# Patient Record
Sex: FEMALE | Race: WHITE | NOT HISPANIC OR LATINO | URBAN - METROPOLITAN AREA
[De-identification: names, ages, dates, MRNs, and addresses within clinical notes are randomized per-mention and may not be internally consistent; named-entity substitution may affect disease eponyms.]

---

## 2017-04-21 ENCOUNTER — OUTPATIENT (OUTPATIENT)
Dept: OUTPATIENT SERVICES | Facility: HOSPITAL | Age: 42
LOS: 1 days | Discharge: HOME | End: 2017-04-21

## 2017-04-21 DIAGNOSIS — D27.1 BENIGN NEOPLASM OF LEFT OVARY: Chronic | ICD-10-CM

## 2017-04-24 DIAGNOSIS — F43.20 ADJUSTMENT DISORDER, UNSPECIFIED: ICD-10-CM

## 2017-11-13 ENCOUNTER — OUTPATIENT (OUTPATIENT)
Dept: OUTPATIENT SERVICES | Facility: HOSPITAL | Age: 42
LOS: 1 days | End: 2017-11-13
Payer: COMMERCIAL

## 2017-11-13 ENCOUNTER — APPOINTMENT (OUTPATIENT)
Dept: MRI IMAGING | Facility: CLINIC | Age: 42
End: 2017-11-13
Payer: SELF-PAY

## 2017-11-13 DIAGNOSIS — D27.1 BENIGN NEOPLASM OF LEFT OVARY: Chronic | ICD-10-CM

## 2017-11-13 DIAGNOSIS — Z00.8 ENCOUNTER FOR OTHER GENERAL EXAMINATION: ICD-10-CM

## 2017-11-13 PROCEDURE — 73721 MRI JNT OF LWR EXTRE W/O DYE: CPT | Mod: 26,RT

## 2017-11-13 PROCEDURE — 73721 MRI JNT OF LWR EXTRE W/O DYE: CPT

## 2020-12-03 ENCOUNTER — INPATIENT (INPATIENT)
Facility: HOSPITAL | Age: 45
LOS: 2 days | Discharge: HOME | End: 2020-12-06
Attending: INTERNAL MEDICINE | Admitting: INTERNAL MEDICINE
Payer: COMMERCIAL

## 2020-12-03 VITALS — HEIGHT: 67 IN

## 2020-12-03 DIAGNOSIS — D27.1 BENIGN NEOPLASM OF LEFT OVARY: Chronic | ICD-10-CM

## 2020-12-03 DIAGNOSIS — R07.9 CHEST PAIN, UNSPECIFIED: ICD-10-CM

## 2020-12-03 LAB
ALBUMIN SERPL ELPH-MCNC: 4 G/DL — SIGNIFICANT CHANGE UP (ref 3.3–5)
ALBUMIN SERPL ELPH-MCNC: 4.1 G/DL — SIGNIFICANT CHANGE UP (ref 3.3–5)
ALBUMIN SERPL ELPH-MCNC: 4.7 G/DL — SIGNIFICANT CHANGE UP (ref 3.3–5)
ALP SERPL-CCNC: 59 U/L — SIGNIFICANT CHANGE UP (ref 40–120)
ALP SERPL-CCNC: 61 U/L — SIGNIFICANT CHANGE UP (ref 40–120)
ALP SERPL-CCNC: 74 U/L — SIGNIFICANT CHANGE UP (ref 40–120)
ALT FLD-CCNC: 20 U/L — SIGNIFICANT CHANGE UP (ref 4–33)
ALT FLD-CCNC: 25 U/L — SIGNIFICANT CHANGE UP (ref 4–33)
ALT FLD-CCNC: 29 U/L — SIGNIFICANT CHANGE UP (ref 4–33)
ANION GAP SERPL CALC-SCNC: 14 MMO/L — SIGNIFICANT CHANGE UP (ref 7–14)
ANION GAP SERPL CALC-SCNC: 18 MMO/L — HIGH (ref 7–14)
APTT BLD: 29.8 SEC — SIGNIFICANT CHANGE UP (ref 27–36.3)
APTT BLD: 32.4 SEC — SIGNIFICANT CHANGE UP (ref 27–36.3)
AST SERPL-CCNC: 104 U/L — HIGH (ref 4–32)
AST SERPL-CCNC: 16 U/L — SIGNIFICANT CHANGE UP (ref 4–32)
AST SERPL-CCNC: 92 U/L — HIGH (ref 4–32)
BASOPHILS # BLD AUTO: 0.08 K/UL — SIGNIFICANT CHANGE UP (ref 0–0.2)
BASOPHILS # BLD AUTO: 0.12 K/UL — SIGNIFICANT CHANGE UP (ref 0–0.2)
BASOPHILS NFR BLD AUTO: 0.5 % — SIGNIFICANT CHANGE UP (ref 0–2)
BASOPHILS NFR BLD AUTO: 0.8 % — SIGNIFICANT CHANGE UP (ref 0–2)
BILIRUB DIRECT SERPL-MCNC: < 0.2 MG/DL — SIGNIFICANT CHANGE UP (ref 0.1–0.2)
BILIRUB SERPL-MCNC: 0.4 MG/DL — SIGNIFICANT CHANGE UP (ref 0.2–1.2)
BILIRUB SERPL-MCNC: 0.6 MG/DL — SIGNIFICANT CHANGE UP (ref 0.2–1.2)
BILIRUB SERPL-MCNC: 0.8 MG/DL — SIGNIFICANT CHANGE UP (ref 0.2–1.2)
BUN SERPL-MCNC: 14 MG/DL — SIGNIFICANT CHANGE UP (ref 7–23)
BUN SERPL-MCNC: 9 MG/DL — SIGNIFICANT CHANGE UP (ref 7–23)
CALCIUM SERPL-MCNC: 8.9 MG/DL — SIGNIFICANT CHANGE UP (ref 8.4–10.5)
CALCIUM SERPL-MCNC: 9.8 MG/DL — SIGNIFICANT CHANGE UP (ref 8.4–10.5)
CHLORIDE SERPL-SCNC: 100 MMOL/L — SIGNIFICANT CHANGE UP (ref 98–107)
CHLORIDE SERPL-SCNC: 101 MMOL/L — SIGNIFICANT CHANGE UP (ref 98–107)
CHOLEST SERPL-MCNC: 180 MG/DL — SIGNIFICANT CHANGE UP (ref 120–199)
CK MB BLD-MCNC: 1.72 NG/ML — SIGNIFICANT CHANGE UP (ref 1–4.7)
CK MB BLD-MCNC: 10.2 — HIGH (ref 0–2.5)
CK MB BLD-MCNC: 9.9 — HIGH (ref 0–2.5)
CK MB BLD-MCNC: 91.3 NG/ML — HIGH (ref 1–4.7)
CK MB BLD-MCNC: 97.94 NG/ML — HIGH (ref 1–4.7)
CK MB BLD-MCNC: SIGNIFICANT CHANGE UP (ref 0–2.5)
CK SERPL-CCNC: 59 U/L — SIGNIFICANT CHANGE UP (ref 25–170)
CK SERPL-CCNC: 895 U/L — HIGH (ref 25–170)
CK SERPL-CCNC: 986 U/L — HIGH (ref 25–170)
CO2 SERPL-SCNC: 21 MMOL/L — LOW (ref 22–31)
CO2 SERPL-SCNC: 22 MMOL/L — SIGNIFICANT CHANGE UP (ref 22–31)
CREAT SERPL-MCNC: 0.48 MG/DL — LOW (ref 0.5–1.3)
CREAT SERPL-MCNC: 0.62 MG/DL — SIGNIFICANT CHANGE UP (ref 0.5–1.3)
D DIMER BLD IA.RAPID-MCNC: 235 NG/ML — SIGNIFICANT CHANGE UP
DIRECT LDL: 113 MG/DL — SIGNIFICANT CHANGE UP
EOSINOPHIL # BLD AUTO: 0.04 K/UL — SIGNIFICANT CHANGE UP (ref 0–0.5)
EOSINOPHIL # BLD AUTO: 0.23 K/UL — SIGNIFICANT CHANGE UP (ref 0–0.5)
EOSINOPHIL NFR BLD AUTO: 0.3 % — SIGNIFICANT CHANGE UP (ref 0–6)
EOSINOPHIL NFR BLD AUTO: 1.6 % — SIGNIFICANT CHANGE UP (ref 0–6)
GLUCOSE SERPL-MCNC: 124 MG/DL — HIGH (ref 70–99)
GLUCOSE SERPL-MCNC: 126 MG/DL — HIGH (ref 70–99)
HBA1C BLD-MCNC: 5.4 % — SIGNIFICANT CHANGE UP (ref 4–5.6)
HCT VFR BLD CALC: 41.4 % — SIGNIFICANT CHANGE UP (ref 34.5–45)
HCT VFR BLD CALC: 44.6 % — SIGNIFICANT CHANGE UP (ref 34.5–45)
HCT VFR BLD CALC: 45.6 % — HIGH (ref 34.5–45)
HDLC SERPL-MCNC: 56 MG/DL — SIGNIFICANT CHANGE UP (ref 45–65)
HGB BLD-MCNC: 13.6 G/DL — SIGNIFICANT CHANGE UP (ref 11.5–15.5)
HGB BLD-MCNC: 14.6 G/DL — SIGNIFICANT CHANGE UP (ref 11.5–15.5)
HGB BLD-MCNC: 14.8 G/DL — SIGNIFICANT CHANGE UP (ref 11.5–15.5)
IMM GRANULOCYTES NFR BLD AUTO: 0.3 % — SIGNIFICANT CHANGE UP (ref 0–1.5)
IMM GRANULOCYTES NFR BLD AUTO: 0.5 % — SIGNIFICANT CHANGE UP (ref 0–1.5)
INR BLD: 1.04 — SIGNIFICANT CHANGE UP (ref 0.88–1.16)
INR BLD: 1.13 — SIGNIFICANT CHANGE UP (ref 0.88–1.16)
LYMPHOCYTES # BLD AUTO: 13.1 % — SIGNIFICANT CHANGE UP (ref 13–44)
LYMPHOCYTES # BLD AUTO: 2.03 K/UL — SIGNIFICANT CHANGE UP (ref 1–3.3)
LYMPHOCYTES # BLD AUTO: 34.3 % — SIGNIFICANT CHANGE UP (ref 13–44)
LYMPHOCYTES # BLD AUTO: 4.97 K/UL — HIGH (ref 1–3.3)
MAGNESIUM SERPL-MCNC: 2 MG/DL — SIGNIFICANT CHANGE UP (ref 1.6–2.6)
MCHC RBC-ENTMCNC: 28.5 PG — SIGNIFICANT CHANGE UP (ref 27–34)
MCHC RBC-ENTMCNC: 28.5 PG — SIGNIFICANT CHANGE UP (ref 27–34)
MCHC RBC-ENTMCNC: 28.9 PG — SIGNIFICANT CHANGE UP (ref 27–34)
MCHC RBC-ENTMCNC: 32.5 % — SIGNIFICANT CHANGE UP (ref 32–36)
MCHC RBC-ENTMCNC: 32.7 % — SIGNIFICANT CHANGE UP (ref 32–36)
MCHC RBC-ENTMCNC: 32.9 % — SIGNIFICANT CHANGE UP (ref 32–36)
MCV RBC AUTO: 86.6 FL — SIGNIFICANT CHANGE UP (ref 80–100)
MCV RBC AUTO: 87.9 FL — SIGNIFICANT CHANGE UP (ref 80–100)
MCV RBC AUTO: 88.3 FL — SIGNIFICANT CHANGE UP (ref 80–100)
MONOCYTES # BLD AUTO: 0.92 K/UL — HIGH (ref 0–0.9)
MONOCYTES # BLD AUTO: 1 K/UL — HIGH (ref 0–0.9)
MONOCYTES NFR BLD AUTO: 5.9 % — SIGNIFICANT CHANGE UP (ref 2–14)
MONOCYTES NFR BLD AUTO: 6.9 % — SIGNIFICANT CHANGE UP (ref 2–14)
NEUTROPHILS # BLD AUTO: 12.33 K/UL — HIGH (ref 1.8–7.4)
NEUTROPHILS # BLD AUTO: 8.1 K/UL — HIGH (ref 1.8–7.4)
NEUTROPHILS NFR BLD AUTO: 56.1 % — SIGNIFICANT CHANGE UP (ref 43–77)
NEUTROPHILS NFR BLD AUTO: 79.7 % — HIGH (ref 43–77)
NRBC # FLD: 0 K/UL — SIGNIFICANT CHANGE UP (ref 0–0)
NT-PROBNP SERPL-SCNC: 631.5 PG/ML — SIGNIFICANT CHANGE UP
PHOSPHATE SERPL-MCNC: 2.8 MG/DL — SIGNIFICANT CHANGE UP (ref 2.5–4.5)
PLATELET # BLD AUTO: 213 K/UL — SIGNIFICANT CHANGE UP (ref 150–400)
PLATELET # BLD AUTO: 252 K/UL — SIGNIFICANT CHANGE UP (ref 150–400)
PLATELET # BLD AUTO: 287 K/UL — SIGNIFICANT CHANGE UP (ref 150–400)
PMV BLD: 10.7 FL — SIGNIFICANT CHANGE UP (ref 7–13)
PMV BLD: 11 FL — SIGNIFICANT CHANGE UP (ref 7–13)
PMV BLD: 11.1 FL — SIGNIFICANT CHANGE UP (ref 7–13)
POTASSIUM SERPL-MCNC: 3.5 MMOL/L — SIGNIFICANT CHANGE UP (ref 3.5–5.3)
POTASSIUM SERPL-MCNC: 3.6 MMOL/L — SIGNIFICANT CHANGE UP (ref 3.5–5.3)
POTASSIUM SERPL-SCNC: 3.5 MMOL/L — SIGNIFICANT CHANGE UP (ref 3.5–5.3)
POTASSIUM SERPL-SCNC: 3.6 MMOL/L — SIGNIFICANT CHANGE UP (ref 3.5–5.3)
PROT SERPL-MCNC: 6.6 G/DL — SIGNIFICANT CHANGE UP (ref 6–8.3)
PROT SERPL-MCNC: 6.9 G/DL — SIGNIFICANT CHANGE UP (ref 6–8.3)
PROT SERPL-MCNC: 7.2 G/DL — SIGNIFICANT CHANGE UP (ref 6–8.3)
PROTHROM AB SERPL-ACNC: 11.8 SEC — SIGNIFICANT CHANGE UP (ref 10.6–13.6)
PROTHROM AB SERPL-ACNC: 12.9 SEC — SIGNIFICANT CHANGE UP (ref 10.6–13.6)
RBC # BLD: 4.78 M/UL — SIGNIFICANT CHANGE UP (ref 3.8–5.2)
RBC # BLD: 5.05 M/UL — SIGNIFICANT CHANGE UP (ref 3.8–5.2)
RBC # BLD: 5.19 M/UL — SIGNIFICANT CHANGE UP (ref 3.8–5.2)
RBC # FLD: 14 % — SIGNIFICANT CHANGE UP (ref 10.3–14.5)
RBC # FLD: 14.1 % — SIGNIFICANT CHANGE UP (ref 10.3–14.5)
RBC # FLD: 14.2 % — SIGNIFICANT CHANGE UP (ref 10.3–14.5)
SARS-COV-2 RNA SPEC QL NAA+PROBE: SIGNIFICANT CHANGE UP
SODIUM SERPL-SCNC: 136 MMOL/L — SIGNIFICANT CHANGE UP (ref 135–145)
SODIUM SERPL-SCNC: 140 MMOL/L — SIGNIFICANT CHANGE UP (ref 135–145)
TRIGL SERPL-MCNC: 146 MG/DL — SIGNIFICANT CHANGE UP (ref 10–149)
TROPONIN T, HIGH SENSITIVITY: 1564 NG/L — CRITICAL HIGH (ref ?–14)
TROPONIN T, HIGH SENSITIVITY: 1568 NG/L — CRITICAL HIGH (ref ?–14)
TROPONIN T, HIGH SENSITIVITY: 39 NG/L — SIGNIFICANT CHANGE UP (ref ?–14)
TSH SERPL-MCNC: 1.57 UIU/ML — SIGNIFICANT CHANGE UP (ref 0.27–4.2)
TSH SERPL-MCNC: 2.36 UIU/ML — SIGNIFICANT CHANGE UP (ref 0.27–4.2)
WBC # BLD: 14.47 K/UL — HIGH (ref 3.8–10.5)
WBC # BLD: 14.56 K/UL — HIGH (ref 3.8–10.5)
WBC # BLD: 15.48 K/UL — HIGH (ref 3.8–10.5)
WBC # FLD AUTO: 14.47 K/UL — HIGH (ref 3.8–10.5)
WBC # FLD AUTO: 14.56 K/UL — HIGH (ref 3.8–10.5)
WBC # FLD AUTO: 15.48 K/UL — HIGH (ref 3.8–10.5)

## 2020-12-03 PROCEDURE — 92941 PRQ TRLML REVSC TOT OCCL AMI: CPT | Mod: LD

## 2020-12-03 PROCEDURE — 93458 L HRT ARTERY/VENTRICLE ANGIO: CPT | Mod: 26,59

## 2020-12-03 PROCEDURE — 99291 CRITICAL CARE FIRST HOUR: CPT

## 2020-12-03 PROCEDURE — 74174 CTA ABD&PLVS W/CONTRAST: CPT | Mod: 26

## 2020-12-03 PROCEDURE — 92978 ENDOLUMINL IVUS OCT C 1ST: CPT | Mod: 26,LD

## 2020-12-03 PROCEDURE — 71045 X-RAY EXAM CHEST 1 VIEW: CPT | Mod: 26

## 2020-12-03 PROCEDURE — 99223 1ST HOSP IP/OBS HIGH 75: CPT

## 2020-12-03 PROCEDURE — 71275 CT ANGIOGRAPHY CHEST: CPT | Mod: 26

## 2020-12-03 PROCEDURE — 93306 TTE W/DOPPLER COMPLETE: CPT | Mod: 26

## 2020-12-03 PROCEDURE — 99152 MOD SED SAME PHYS/QHP 5/>YRS: CPT

## 2020-12-03 RX ORDER — ACETAMINOPHEN 500 MG
650 TABLET ORAL ONCE
Refills: 0 | Status: COMPLETED | OUTPATIENT
Start: 2020-12-03 | End: 2020-12-03

## 2020-12-03 RX ORDER — LABETALOL HCL 100 MG
10 TABLET ORAL ONCE
Refills: 0 | Status: COMPLETED | OUTPATIENT
Start: 2020-12-03 | End: 2020-12-03

## 2020-12-03 RX ORDER — ATORVASTATIN CALCIUM 80 MG/1
80 TABLET, FILM COATED ORAL AT BEDTIME
Refills: 0 | Status: DISCONTINUED | OUTPATIENT
Start: 2020-12-03 | End: 2020-12-06

## 2020-12-03 RX ORDER — METOPROLOL TARTRATE 50 MG
25 TABLET ORAL
Refills: 0 | Status: DISCONTINUED | OUTPATIENT
Start: 2020-12-03 | End: 2020-12-03

## 2020-12-03 RX ORDER — HEPARIN SODIUM 5000 [USP'U]/ML
4000 INJECTION INTRAVENOUS; SUBCUTANEOUS EVERY 6 HOURS
Refills: 0 | Status: DISCONTINUED | OUTPATIENT
Start: 2020-12-03 | End: 2020-12-03

## 2020-12-03 RX ORDER — NITROGLYCERIN 6.5 MG
0.4 CAPSULE, EXTENDED RELEASE ORAL
Refills: 0 | Status: DISCONTINUED | OUTPATIENT
Start: 2020-12-03 | End: 2020-12-03

## 2020-12-03 RX ORDER — CARVEDILOL PHOSPHATE 80 MG/1
6.25 CAPSULE, EXTENDED RELEASE ORAL EVERY 12 HOURS
Refills: 0 | Status: DISCONTINUED | OUTPATIENT
Start: 2020-12-03 | End: 2020-12-03

## 2020-12-03 RX ORDER — LOSARTAN POTASSIUM 100 MG/1
25 TABLET, FILM COATED ORAL ONCE
Refills: 0 | Status: COMPLETED | OUTPATIENT
Start: 2020-12-03 | End: 2020-12-03

## 2020-12-03 RX ORDER — NICOTINE POLACRILEX 2 MG
1 GUM BUCCAL DAILY
Refills: 0 | Status: DISCONTINUED | OUTPATIENT
Start: 2020-12-03 | End: 2020-12-06

## 2020-12-03 RX ORDER — HEPARIN SODIUM 5000 [USP'U]/ML
INJECTION INTRAVENOUS; SUBCUTANEOUS
Qty: 25000 | Refills: 0 | Status: DISCONTINUED | OUTPATIENT
Start: 2020-12-03 | End: 2020-12-03

## 2020-12-03 RX ORDER — ONDANSETRON 8 MG/1
4 TABLET, FILM COATED ORAL ONCE
Refills: 0 | Status: COMPLETED | OUTPATIENT
Start: 2020-12-03 | End: 2020-12-03

## 2020-12-03 RX ORDER — LOSARTAN POTASSIUM 100 MG/1
50 TABLET, FILM COATED ORAL DAILY
Refills: 0 | Status: DISCONTINUED | OUTPATIENT
Start: 2020-12-04 | End: 2020-12-05

## 2020-12-03 RX ORDER — CHLORHEXIDINE GLUCONATE 213 G/1000ML
1 SOLUTION TOPICAL
Refills: 0 | Status: DISCONTINUED | OUTPATIENT
Start: 2020-12-03 | End: 2020-12-05

## 2020-12-03 RX ORDER — CARVEDILOL PHOSPHATE 80 MG/1
12.5 CAPSULE, EXTENDED RELEASE ORAL EVERY 12 HOURS
Refills: 0 | Status: DISCONTINUED | OUTPATIENT
Start: 2020-12-03 | End: 2020-12-06

## 2020-12-03 RX ORDER — LOSARTAN POTASSIUM 100 MG/1
25 TABLET, FILM COATED ORAL DAILY
Refills: 0 | Status: DISCONTINUED | OUTPATIENT
Start: 2020-12-03 | End: 2020-12-03

## 2020-12-03 RX ORDER — PANTOPRAZOLE SODIUM 20 MG/1
40 TABLET, DELAYED RELEASE ORAL
Refills: 0 | Status: DISCONTINUED | OUTPATIENT
Start: 2020-12-03 | End: 2020-12-06

## 2020-12-03 RX ORDER — LOSARTAN POTASSIUM 100 MG/1
50 TABLET, FILM COATED ORAL DAILY
Refills: 0 | Status: DISCONTINUED | OUTPATIENT
Start: 2020-12-03 | End: 2020-12-03

## 2020-12-03 RX ORDER — ASPIRIN/CALCIUM CARB/MAGNESIUM 324 MG
324 TABLET ORAL ONCE
Refills: 0 | Status: COMPLETED | OUTPATIENT
Start: 2020-12-03 | End: 2020-12-03

## 2020-12-03 RX ORDER — ASPIRIN/CALCIUM CARB/MAGNESIUM 324 MG
81 TABLET ORAL DAILY
Refills: 0 | Status: DISCONTINUED | OUTPATIENT
Start: 2020-12-03 | End: 2020-12-06

## 2020-12-03 RX ORDER — TICAGRELOR 90 MG/1
180 TABLET ORAL ONCE
Refills: 0 | Status: COMPLETED | OUTPATIENT
Start: 2020-12-03 | End: 2020-12-03

## 2020-12-03 RX ORDER — HEPARIN SODIUM 5000 [USP'U]/ML
4000 INJECTION INTRAVENOUS; SUBCUTANEOUS ONCE
Refills: 0 | Status: COMPLETED | OUTPATIENT
Start: 2020-12-03 | End: 2020-12-03

## 2020-12-03 RX ORDER — NITROGLYCERIN 6.5 MG
10 CAPSULE, EXTENDED RELEASE ORAL
Qty: 50 | Refills: 0 | Status: DISCONTINUED | OUTPATIENT
Start: 2020-12-03 | End: 2020-12-04

## 2020-12-03 RX ORDER — LANOLIN ALCOHOL/MO/W.PET/CERES
9 CREAM (GRAM) TOPICAL AT BEDTIME
Refills: 0 | Status: DISCONTINUED | OUTPATIENT
Start: 2020-12-03 | End: 2020-12-06

## 2020-12-03 RX ORDER — FENTANYL CITRATE 50 UG/ML
50 INJECTION INTRAVENOUS ONCE
Refills: 0 | Status: DISCONTINUED | OUTPATIENT
Start: 2020-12-03 | End: 2020-12-03

## 2020-12-03 RX ORDER — HEPARIN SODIUM 5000 [USP'U]/ML
5000 INJECTION INTRAVENOUS; SUBCUTANEOUS EVERY 12 HOURS
Refills: 0 | Status: DISCONTINUED | OUTPATIENT
Start: 2020-12-03 | End: 2020-12-06

## 2020-12-03 RX ORDER — TICAGRELOR 90 MG/1
90 TABLET ORAL EVERY 12 HOURS
Refills: 0 | Status: DISCONTINUED | OUTPATIENT
Start: 2020-12-03 | End: 2020-12-06

## 2020-12-03 RX ADMIN — Medication 81 MILLIGRAM(S): at 10:52

## 2020-12-03 RX ADMIN — HEPARIN SODIUM 1000 UNIT(S)/HR: 5000 INJECTION INTRAVENOUS; SUBCUTANEOUS at 03:19

## 2020-12-03 RX ADMIN — ONDANSETRON 4 MILLIGRAM(S): 8 TABLET, FILM COATED ORAL at 15:50

## 2020-12-03 RX ADMIN — LOSARTAN POTASSIUM 25 MILLIGRAM(S): 100 TABLET, FILM COATED ORAL at 12:42

## 2020-12-03 RX ADMIN — Medication 0.5 MILLIGRAM(S): at 16:21

## 2020-12-03 RX ADMIN — Medication 650 MILLIGRAM(S): at 08:06

## 2020-12-03 RX ADMIN — TICAGRELOR 180 MILLIGRAM(S): 90 TABLET ORAL at 03:18

## 2020-12-03 RX ADMIN — Medication 3 MICROGRAM(S)/MIN: at 14:58

## 2020-12-03 RX ADMIN — Medication 10 MILLIGRAM(S): at 12:42

## 2020-12-03 RX ADMIN — CARVEDILOL PHOSPHATE 12.5 MILLIGRAM(S): 80 CAPSULE, EXTENDED RELEASE ORAL at 16:26

## 2020-12-03 RX ADMIN — Medication 650 MILLIGRAM(S): at 20:30

## 2020-12-03 RX ADMIN — Medication 650 MILLIGRAM(S): at 09:00

## 2020-12-03 RX ADMIN — FENTANYL CITRATE 50 MICROGRAM(S): 50 INJECTION INTRAVENOUS at 03:18

## 2020-12-03 RX ADMIN — PANTOPRAZOLE SODIUM 40 MILLIGRAM(S): 20 TABLET, DELAYED RELEASE ORAL at 08:06

## 2020-12-03 RX ADMIN — Medication 324 MILLIGRAM(S): at 03:17

## 2020-12-03 RX ADMIN — ATORVASTATIN CALCIUM 80 MILLIGRAM(S): 80 TABLET, FILM COATED ORAL at 21:38

## 2020-12-03 RX ADMIN — Medication 9 MILLIGRAM(S): at 21:38

## 2020-12-03 RX ADMIN — Medication 0.4 MILLIGRAM(S): at 03:19

## 2020-12-03 RX ADMIN — LOSARTAN POTASSIUM 25 MILLIGRAM(S): 100 TABLET, FILM COATED ORAL at 10:52

## 2020-12-03 RX ADMIN — Medication 25 MILLIGRAM(S): at 06:35

## 2020-12-03 RX ADMIN — TICAGRELOR 90 MILLIGRAM(S): 90 TABLET ORAL at 16:26

## 2020-12-03 RX ADMIN — Medication 10 MILLIGRAM(S): at 03:18

## 2020-12-03 RX ADMIN — HEPARIN SODIUM 4000 UNIT(S): 5000 INJECTION INTRAVENOUS; SUBCUTANEOUS at 03:19

## 2020-12-03 RX ADMIN — Medication 3 MICROGRAM(S)/MIN: at 19:00

## 2020-12-03 RX ADMIN — Medication 1 PATCH: at 19:17

## 2020-12-03 RX ADMIN — HEPARIN SODIUM 5000 UNIT(S): 5000 INJECTION INTRAVENOUS; SUBCUTANEOUS at 17:15

## 2020-12-03 RX ADMIN — CHLORHEXIDINE GLUCONATE 1 APPLICATION(S): 213 SOLUTION TOPICAL at 12:29

## 2020-12-03 RX ADMIN — Medication 0.5 MILLIGRAM(S): at 23:22

## 2020-12-03 RX ADMIN — Medication 1 PATCH: at 10:52

## 2020-12-03 RX ADMIN — HEPARIN SODIUM 4000 UNIT(S): 5000 INJECTION INTRAVENOUS; SUBCUTANEOUS at 03:18

## 2020-12-03 RX ADMIN — Medication 650 MILLIGRAM(S): at 20:00

## 2020-12-03 RX ADMIN — HEPARIN SODIUM 5000 UNIT(S): 5000 INJECTION INTRAVENOUS; SUBCUTANEOUS at 06:35

## 2020-12-03 NOTE — H&P ADULT - NSHPSOCIALHISTORY_GEN_ALL_CORE
Lives with   Works as   Daily smoker x 25 years  Daily drinker: vodka Lives with   Works as   Daily smoker x 25 years  Drinks 3-5 x week: vodka

## 2020-12-03 NOTE — H&P ADULT - HISTORY OF PRESENT ILLNESS
46 yo female PMH HTN, active smoker presents with chest pain. Reports pain started 4 days ago. Describes pain as substernal radiating bilaterally to arms and back. The pain returned 2 days later and again last night. In ED ekg shows KATHRYN in anterior leads. Cath lab activated. Pt now s/p LHC with THIAGO to pLAD and mLAD. EDP33, EF 40%. Transfer to CCU             46 yo female PMH HTN, active smoker presents with chest pain. Reports pain started 4 days ago. Describes pain as substernal radiating bilaterally to arms and back. The pain returned 2 days later and again last night. In ED CXR with widened mediastinum, CTA done and negative for dissection. ekg shows KATHRYN in anterior leads. Cath lab activated. Pt loaded with aspirin, Brilinta and heparin per [facility] protocol. Pt now s/p LHC with THIAGO to pLAD and mLAD. EDP33, EF 40%. Transfer to CCU

## 2020-12-03 NOTE — ED PROVIDER NOTE - CLINICAL SUMMARY MEDICAL DECISION MAKING FREE TEXT BOX
46 y/o F with h/o HTN, active smoker with chest pain.  EKG in triage with STEMI.  d/w interventional cards, cath lab activated.  Given hypertension, back pain, and wide appearing mediastinum on cxr, will obtain CTA to r/o dissection, ccu at bedside.

## 2020-12-03 NOTE — PROGRESS NOTE ADULT - SUBJECTIVE AND OBJECTIVE BOX
Chief compliant: intermittent chest pain x 3 days    Interval history/Overnight events:    Tele events:    Vital Signs Last 24 Hrs  T(C): 36.5 (03 Dec 2020 03:15), Max: 36.5 (03 Dec 2020 03:15)  T(F): 97.7 (03 Dec 2020 03:15), Max: 97.7 (03 Dec 2020 03:15)  HR: 89 (03 Dec 2020 06:00) (84 - 89)  BP: 176/117 (03 Dec 2020 06:00) (167/112 - 183/110)  BP(mean): 132 (03 Dec 2020 06:00) (121 - 133)  RR: 21 (03 Dec 2020 06:00) (17 - 21)  SpO2: 99% (03 Dec 2020 06:00) (96% - 99%)    Allergies: No Known Allergies          MEDICATIONS  (STANDING):  aspirin enteric coated 81 milliGRAM(s) Oral daily  atorvastatin 80 milliGRAM(s) Oral at bedtime  chlorhexidine 4% Liquid 1 Application(s) Topical <User Schedule>  heparin   Injectable 5000 Unit(s) SubCutaneous every 12 hours  metoprolol tartrate 25 milliGRAM(s) Oral two times a day  nicotine -  14 mG/24Hr(s) Patch 1 patch Transdermal daily  ticagrelor 90 milliGRAM(s) Oral every 12 hours    MEDICATIONS  (PRN):      CONSTITUTIONAL: No fevers, No chills, No fatigue, No weight gain  EYES: No vision changes   ENT: No congestion, No ear pain, No sore throat.  NECK: No pain, No stiffness  RESPIRATORY: No shortness of breath, No cough, No wheezing, No hemoptysis  CARDIOVASCULAR: No chest pain. No palpitations, No BARRIGA, No orthopnea, No paroxysmal nocturnal dyspnea, No pleuritic pain  GASTROINTESTINAL: No abdominal pain, No nausea, No vomiting, No hematemesis, No diarrhea No constipation. No melena  GENITOURINARY: No dysuria, No frequency, No incontinence, No hematuria  NEUROLOGICAL: No dizziness, No lightheadedness, No syncope, No LOC, No headache, No numbness or weakness  MUSCULOSKELETAL: No Edema, No joint pain, No joint swelling.  PSYCHIATRIC: No anxiety, No depression  DERMATOLOGY: No diaphoresis. No itching, No rashes, No pressure ulcers  HEME/LYMPH: No easy bruising, or bleeding gums        PHYSICAL EXAMINATION  Appearance: NAD, no distress  HEENT: Moist Mucous Membranes, Anicteric, PERRL, EOMI  Cardiovascular: Regular rate and rhythm, Normal S1 S2, No JVD, No murmurs  Respiratory: Lungs clear to auscultation. No rales, No rhonchi, No wheezing. No tenderness to palpation  Gastrointestinal:  Soft, Non-tender, + BS  Neurologic: Non-focal, A&Ox3  Skin: Warm and dry, No rashes, No ecchymosis, No cyanosis  Musculoskeletal: No clubbing, No cyanosis, No edema  Vascular: Peripheral pulses palpable 2+ bilaterally  Psychiatry: Mood & affect appropriate      	    		      I&O's Summary    02 Dec 2020 07:01  -  03 Dec 2020 07:00  --------------------------------------------------------  IN: 0 mL / OUT: 100 mL / NET: -100 mL    	        Imaging:  echo:  Stress test:  Cath:  CT scan:    Labs:                          13.6   15.48 )-----------( 213      ( 03 Dec 2020 06:20 )             41.4       12-03    136  |  101  |  9   ----------------------------<  124<H>  3.6   |  21<L>  |  0.48<L>    Ca    8.9      03 Dec 2020 06:20  Phos  2.8     12-03  Mg     2.0     12-03    TPro  6.6  /  Alb  4.0  /  TBili  0.6  /  DBili  x   /  AST  92<H>  /  ALT  25  /  AlkPhos  61  12-03      CARDIAC MARKERS ( 03 Dec 2020 06:20 )  x     / x     / 895 u/L / 91.30 ng/mL / x      CARDIAC MARKERS ( 03 Dec 2020 00:45 )  x     / x     / 59 u/L / 1.72 ng/mL / x          proBNP  Serum Pro-Brain Natriuretic Peptide: 631.5 pg/mL (12-03 @ 06:20)      Chol,Trig,LDL,HDL,A1C  Cholesterol, Serum: 180 mg/dL (12-03 @ 06:20)  Triglycerides, Serum: 146 mg/dL (12-03 @ 06:20)  HDL Cholesterol, Serum: 56 mg/dL (12-03 @ 06:20)      LIVER FUNCTIONS - ( 03 Dec 2020 06:20 )  Alb: 4.0 g/dL / Pro: 6.6 g/dL / ALK PHOS: 61 u/L / ALT: 25 u/L / AST: 92 u/L / GGT: x             PT/INR - ( 03 Dec 2020 06:20 )   PT: 12.9 SEC;   INR: 1.13          PTT - ( 03 Dec 2020 06:20 )  PTT:32.4 SEC  LABORATORY VALUES	 	                          13.6   15.48 )-----------( 213      ( 03 Dec 2020 06:20 )             41.4       12-03    136  |  101  |  9   ----------------------------<  124<H>  3.6   |  21<L>  |  0.48<L>  12-03    140  |  100  |  14  ----------------------------<  126<H>  3.5   |  22  |  0.62    Ca    8.9      03 Dec 2020 06:20  Ca    9.8      03 Dec 2020 00:45  Phos  2.8     12-03  Mg     2.0     12-03    TPro  6.6  /  Alb  4.0  /  TBili  0.6  /  DBili  x   /  AST  92<H>  /  ALT  25  /  AlkPhos  61  12-03  TPro  7.2  /  Alb  4.7  /  TBili  0.4  /  DBili  x   /  AST  16  /  ALT  20  /  AlkPhos  74  12-03    LIVER FUNCTIONS - ( 03 Dec 2020 06:20 )  Alb: 4.0 g/dL / Pro: 6.6 g/dL / ALK PHOS: 61 u/L / ALT: 25 u/L / AST: 92 u/L / GGT: x           Prothrombin Time, Plasma: 12.9 SEC (12-03 @ 06:20)      CARDIAC MARKERS:  Creatine Kinase, Serum: 895 u/L (12-03 @ 06:20)  Creatine Kinase, Serum: 59 u/L (12-03 @ 00:45)    CKMB: 91.30 ng/mL (12-03 @ 06:20)  CKMB: 1.72 ng/mL (12-03 @ 00:45)    CKMB Relative Index: 10.2 (12-03 @ 06:20)  CKMB Relative Index: Test not performed (12-03 @ 00:45)    Troponin T, High Sensitivity: 1564 ng/L (12-03 @ 06:20)  Troponin T, High Sensitivity: 39 ng/L (12-03 @ 00:45)    Serum Pro-Brain Natriuretic Peptide: 631.5 pg/mL (12-03 @ 06:20)      12-03 @ 06:20  Cholesterol, Serum - 180  Direct LDL- 113  HDL Cholesterol, Serum- 56  Triglycerides, Serum- 146      Thyroid Stimulating Hormone, Serum: 1.57 uIU/mL (12-03 @ 06:20)        CAPILLARY BLOOD GLUCOSE                CBC Full  -  ( 03 Dec 2020 06:20 )  WBC Count : 15.48 K/uL  Hemoglobin : 13.6 g/dL  Hematocrit : 41.4 %  Platelet Count - Automated : 213 K/uL  Mean Cell Volume : 86.6 fL  Mean Cell Hemoglobin : 28.5 pg  Mean Cell Hemoglobin Concentration : 32.9 %  Auto Neutrophil # : 12.33 K/uL  Auto Lymphocyte # : 2.03 K/uL  Auto Monocyte # : 0.92 K/uL  Auto Eosinophil # : 0.04 K/uL  Auto Basophil # : 0.08 K/uL  Auto Neutrophil % : 79.7 %  Auto Lymphocyte % : 13.1 %  Auto Monocyte % : 5.9 %  Auto Eosinophil % : 0.3 %  Auto Basophil % : 0.5 %      12-03    136  |  101  |  9   ----------------------------<  124<H>  3.6   |  21<L>  |  0.48<L>  12-03    140  |  100  |  14  ----------------------------<  126<H>  3.5   |  22  |  0.62    Ca    8.9      03 Dec 2020 06:20  Ca    9.8      03 Dec 2020 00:45  Phos  2.8     12-03  Mg     2.0     12-03    TPro  6.6  /  Alb  4.0  /  TBili  0.6  /  DBili  x   /  AST  92<H>  /  ALT  25  /  AlkPhos  61  12-03  TPro  7.2  /  Alb  4.7  /  TBili  0.4  /  DBili  x   /  AST  16  /  ALT  20  /  AlkPhos  74  12-03    LIVER FUNCTIONS - ( 03 Dec 2020 06:20 )  Alb: 4.0 g/dL / Pro: 6.6 g/dL / ALK PHOS: 61 u/L / ALT: 25 u/L / AST: 92 u/L / GGT: x             Serum Pro-Brain Natriuretic Peptide: 631.5 pg/mL (12-03 @ 06:20)          CAPILLARY BLOOD GLUCOSE          Assesment /Plan:   44 yo female PMH HTN, active smoker presents with  intermittent  substernal  chest pain radiating bilaterally to arms and back.  CTA done and negative for dissection. ekg shows KATHRYN in anterior leads. s/p LHC with THIAGO to pLAD and mLAD via R radial. EDP33, EF 40%    Neuro;  AxOx3    Pulm;      CVS  #STEMI (ST elevation myocardial infarction).    - Patient with ST elevations anterior leads  -Loaded with  mg now, Brilinta 180 mg and heparin 5000 units IVP x 1 in ER  - s/p emergent cardiac cath with THIAGO x 2 P &mLAD.  -Continue ASA 81 mg qd, Brilinta 90 mg qd  -Start Atorvastatin 80 mg QD,   -metoprolol 25mg BID      # Hypertension.  - started on metoprolol 25mg bid   - DASH diet  -Monitor blood pressure.         GI    Renal    ID    Endo    m: VTE (venous thromboembolism).   - IMPROVE-DD Assessment completed: <1%  -Heparin sq.                    Chief compliant: intermittent chest pain x 3 days    Interval history/Overnight events:    Tele events: c/o midsternal chest  4/10 soreness overnight. elevated BP -170 overnight    Vital Signs Last 24 Hrs  T(C): 36.5 (03 Dec 2020 03:15), Max: 36.5 (03 Dec 2020 03:15)  T(F): 97.7 (03 Dec 2020 03:15), Max: 97.7 (03 Dec 2020 03:15)  HR: 89 (03 Dec 2020 06:00) (84 - 89)  BP: 176/117 (03 Dec 2020 06:00) (167/112 - 183/110)  BP(mean): 132 (03 Dec 2020 06:00) (121 - 133)  RR: 21 (03 Dec 2020 06:00) (17 - 21)  SpO2: 99% (03 Dec 2020 06:00) (96% - 99%)    Allergies: No Known Allergies          MEDICATIONS  (STANDING):  aspirin enteric coated 81 milliGRAM(s) Oral daily  atorvastatin 80 milliGRAM(s) Oral at bedtime  chlorhexidine 4% Liquid 1 Application(s) Topical <User Schedule>  heparin   Injectable 5000 Unit(s) SubCutaneous every 12 hours  metoprolol tartrate 25 milliGRAM(s) Oral two times a day  nicotine -  14 mG/24Hr(s) Patch 1 patch Transdermal daily  ticagrelor 90 milliGRAM(s) Oral every 12 hours    MEDICATIONS  (PRN):      CONSTITUTIONAL: No fevers, No chills, No fatigue, No weight gain  EYES: No vision changes   ENT: No congestion, No ear pain, No sore throat.  NECK: No pain, No stiffness  RESPIRATORY: No shortness of breath, No cough, No wheezing, No hemoptysis  CARDIOVASCULAR: + chest pain. No palpitations, No BARRIGA, No orthopnea, No paroxysmal nocturnal dyspnea, No pleuritic pain  GASTROINTESTINAL: No abdominal pain, No nausea, No vomiting, No hematemesis, No diarrhea No constipation. No melena  GENITOURINARY: No dysuria, No frequency, No incontinence, No hematuria  NEUROLOGICAL: No dizziness, No lightheadedness, No syncope, No LOC, No headache, No numbness or weakness  MUSCULOSKELETAL: No Edema, No joint pain, No joint swelling.  PSYCHIATRIC: No anxiety, No depression  DERMATOLOGY: No diaphoresis. No itching, No rashes, No pressure ulcers  HEME/LYMPH: No easy bruising, or bleeding gums        PHYSICAL EXAMINATION  Appearance: NAD, no distress  HEENT: Moist Mucous Membranes, Anicteric, PERRL, EOMI  Cardiovascular: Regular rate and rhythm, Normal S1 S2, No JVD, No murmurs  Respiratory: Lungs clear to auscultation. No rales, No rhonchi, No wheezing. No tenderness to palpation  Gastrointestinal:  Soft, Non-tender, + BS  Neurologic: Non-focal, A&Ox3  Skin: Warm and dry, No rashes, No ecchymosis, No cyanosis  Musculoskeletal: No clubbing, No cyanosis, No edema  Vascular: Peripheral pulses palpable 2+ bilaterally  Psychiatry: Mood & affect appropriate      	    		      I&O's Summary    02 Dec 2020 07:01  -  03 Dec 2020 07:00  --------------------------------------------------------  IN: 0 mL / OUT: 100 mL / NET: -100 mL    	        Imaging:  echo:  Stress test:  Cath:  CT scan:    Labs:                          13.6   15.48 )-----------( 213      ( 03 Dec 2020 06:20 )             41.4       12-03    136  |  101  |  9   ----------------------------<  124<H>  3.6   |  21<L>  |  0.48<L>    Ca    8.9      03 Dec 2020 06:20  Phos  2.8     12-03  Mg     2.0     12-03    TPro  6.6  /  Alb  4.0  /  TBili  0.6  /  DBili  x   /  AST  92<H>  /  ALT  25  /  AlkPhos  61  12-03      CARDIAC MARKERS ( 03 Dec 2020 06:20 )  x     / x     / 895 u/L / 91.30 ng/mL / x      CARDIAC MARKERS ( 03 Dec 2020 00:45 )  x     / x     / 59 u/L / 1.72 ng/mL / x          proBNP  Serum Pro-Brain Natriuretic Peptide: 631.5 pg/mL (12-03 @ 06:20)      Chol,Trig,LDL,HDL,A1C  Cholesterol, Serum: 180 mg/dL (12-03 @ 06:20)  Triglycerides, Serum: 146 mg/dL (12-03 @ 06:20)  HDL Cholesterol, Serum: 56 mg/dL (12-03 @ 06:20)      LIVER FUNCTIONS - ( 03 Dec 2020 06:20 )  Alb: 4.0 g/dL / Pro: 6.6 g/dL / ALK PHOS: 61 u/L / ALT: 25 u/L / AST: 92 u/L / GGT: x             PT/INR - ( 03 Dec 2020 06:20 )   PT: 12.9 SEC;   INR: 1.13          PTT - ( 03 Dec 2020 06:20 )  PTT:32.4 SEC  LABORATORY VALUES	 	                          13.6   15.48 )-----------( 213      ( 03 Dec 2020 06:20 )             41.4       12-03    136  |  101  |  9   ----------------------------<  124<H>  3.6   |  21<L>  |  0.48<L>  12-03    140  |  100  |  14  ----------------------------<  126<H>  3.5   |  22  |  0.62    Ca    8.9      03 Dec 2020 06:20  Ca    9.8      03 Dec 2020 00:45  Phos  2.8     12-03  Mg     2.0     12-03    TPro  6.6  /  Alb  4.0  /  TBili  0.6  /  DBili  x   /  AST  92<H>  /  ALT  25  /  AlkPhos  61  12-03  TPro  7.2  /  Alb  4.7  /  TBili  0.4  /  DBili  x   /  AST  16  /  ALT  20  /  AlkPhos  74  12-03    LIVER FUNCTIONS - ( 03 Dec 2020 06:20 )  Alb: 4.0 g/dL / Pro: 6.6 g/dL / ALK PHOS: 61 u/L / ALT: 25 u/L / AST: 92 u/L / GGT: x           Prothrombin Time, Plasma: 12.9 SEC (12-03 @ 06:20)      CARDIAC MARKERS:  Creatine Kinase, Serum: 895 u/L (12-03 @ 06:20)  Creatine Kinase, Serum: 59 u/L (12-03 @ 00:45)    CKMB: 91.30 ng/mL (12-03 @ 06:20)  CKMB: 1.72 ng/mL (12-03 @ 00:45)    CKMB Relative Index: 10.2 (12-03 @ 06:20)  CKMB Relative Index: Test not performed (12-03 @ 00:45)    Troponin T, High Sensitivity: 1564 ng/L (12-03 @ 06:20)  Troponin T, High Sensitivity: 39 ng/L (12-03 @ 00:45)    Serum Pro-Brain Natriuretic Peptide: 631.5 pg/mL (12-03 @ 06:20)      12-03 @ 06:20  Cholesterol, Serum - 180  Direct LDL- 113  HDL Cholesterol, Serum- 56  Triglycerides, Serum- 146      Thyroid Stimulating Hormone, Serum: 1.57 uIU/mL (12-03 @ 06:20)        CAPILLARY BLOOD GLUCOSE                CBC Full  -  ( 03 Dec 2020 06:20 )  WBC Count : 15.48 K/uL  Hemoglobin : 13.6 g/dL  Hematocrit : 41.4 %  Platelet Count - Automated : 213 K/uL  Mean Cell Volume : 86.6 fL  Mean Cell Hemoglobin : 28.5 pg  Mean Cell Hemoglobin Concentration : 32.9 %  Auto Neutrophil # : 12.33 K/uL  Auto Lymphocyte # : 2.03 K/uL  Auto Monocyte # : 0.92 K/uL  Auto Eosinophil # : 0.04 K/uL  Auto Basophil # : 0.08 K/uL  Auto Neutrophil % : 79.7 %  Auto Lymphocyte % : 13.1 %  Auto Monocyte % : 5.9 %  Auto Eosinophil % : 0.3 %  Auto Basophil % : 0.5 %      12-03    136  |  101  |  9   ----------------------------<  124<H>  3.6   |  21<L>  |  0.48<L>  12-03    140  |  100  |  14  ----------------------------<  126<H>  3.5   |  22  |  0.62    Ca    8.9      03 Dec 2020 06:20  Ca    9.8      03 Dec 2020 00:45  Phos  2.8     12-03  Mg     2.0     12-03    TPro  6.6  /  Alb  4.0  /  TBili  0.6  /  DBili  x   /  AST  92<H>  /  ALT  25  /  AlkPhos  61  12-03  TPro  7.2  /  Alb  4.7  /  TBili  0.4  /  DBili  x   /  AST  16  /  ALT  20  /  AlkPhos  74  12-03    LIVER FUNCTIONS - ( 03 Dec 2020 06:20 )  Alb: 4.0 g/dL / Pro: 6.6 g/dL / ALK PHOS: 61 u/L / ALT: 25 u/L / AST: 92 u/L / GGT: x             Serum Pro-Brain Natriuretic Peptide: 631.5 pg/mL (12-03 @ 06:20)          CAPILLARY BLOOD GLUCOSE          Assesment /Plan:   46 yo female PMH HTN, active smoker, drinl 3-5 vodka/week presents with  intermittent  substernal  chest pain radiating bilaterally to arms and back x 3 days  CTA done and negative for dissection. Ekg shows KATHRYN in anterior leads. s/p LHC with THIAGO to pLAD and mLAD via R radial. EDP33, EF 40%    Neuro;  AxOx3    Pulm;  b/l lung clear      CVS  #STEMI (ST elevation myocardial infarction).    - Patient with ST elevations anterior leads  -Loaded with  mg now, Brilinta 180 mg and heparin 5000 units IVP x 1 in ER  - s/p emergent cardiac cath with THIAGO x 2 pLAD &mLAD.  -Continue ASA 81 mg qd, Brilinta 90 mg qd  -Start Atorvastatin 80 mg QD,   -on metoprolol 25mg BID will titrate as Blood Pressure tolerate  -check lipid profile, TSH, A1C,trend CK/trop      # Hypertension.  -  in ED  - overnight -180  - started on metoprolol 25mg bid   - will add ace-I, once creatinine stable   - DASH diet  -Monitor blood pressure.         #GI;  -protonix 40mg Po daily         VTE (venous thromboembolism).   - IMPROVE-DD Assessment completed: <1%  -Heparin sq.                    Chief compliant: intermittent chest pain x 3 days    Interval history/Overnight events:    Tele events: c/o midsternal chest  4/10 soreness overnight. elevated BP -170 overnight    Vital Signs Last 24 Hrs  T(C): 36.5 (03 Dec 2020 03:15), Max: 36.5 (03 Dec 2020 03:15)  T(F): 97.7 (03 Dec 2020 03:15), Max: 97.7 (03 Dec 2020 03:15)  HR: 89 (03 Dec 2020 06:00) (84 - 89)  BP: 176/117 (03 Dec 2020 06:00) (167/112 - 183/110)  BP(mean): 132 (03 Dec 2020 06:00) (121 - 133)  RR: 21 (03 Dec 2020 06:00) (17 - 21)  SpO2: 99% (03 Dec 2020 06:00) (96% - 99%)    Allergies: No Known Allergies          MEDICATIONS  (STANDING):  aspirin enteric coated 81 milliGRAM(s) Oral daily  atorvastatin 80 milliGRAM(s) Oral at bedtime  chlorhexidine 4% Liquid 1 Application(s) Topical <User Schedule>  heparin   Injectable 5000 Unit(s) SubCutaneous every 12 hours  metoprolol tartrate 25 milliGRAM(s) Oral two times a day  nicotine -  14 mG/24Hr(s) Patch 1 patch Transdermal daily  ticagrelor 90 milliGRAM(s) Oral every 12 hours    MEDICATIONS  (PRN):      CONSTITUTIONAL: No fevers, No chills, No fatigue, No weight gain  EYES: No vision changes   ENT: No congestion, No ear pain, No sore throat.  NECK: No pain, No stiffness  RESPIRATORY: No shortness of breath, No cough, No wheezing, No hemoptysis  CARDIOVASCULAR: + chest pain. No palpitations, No BARRIGA, No orthopnea, No paroxysmal nocturnal dyspnea, No pleuritic pain  GASTROINTESTINAL: No abdominal pain, No nausea, No vomiting, No hematemesis, No diarrhea No constipation. No melena  GENITOURINARY: No dysuria, No frequency, No incontinence, No hematuria  NEUROLOGICAL: No dizziness, No lightheadedness, No syncope, No LOC, No headache, No numbness or weakness  MUSCULOSKELETAL: No Edema, No joint pain, No joint swelling.  PSYCHIATRIC: No anxiety, No depression  DERMATOLOGY: No diaphoresis. No itching, No rashes, No pressure ulcers  HEME/LYMPH: No easy bruising, or bleeding gums        PHYSICAL EXAMINATION  Appearance: NAD, no distress  HEENT: Moist Mucous Membranes, Anicteric, PERRL, EOMI  Cardiovascular: Regular rate and rhythm, Normal S1 S2, No JVD, No murmurs  Respiratory: Lungs clear to auscultation. No rales, No rhonchi, No wheezing. No tenderness to palpation  Gastrointestinal:  Soft, Non-tender, + BS  Neurologic: Non-focal, A&Ox3  Skin: Warm and dry, No rashes, No ecchymosis, No cyanosis  Musculoskeletal: No clubbing, No cyanosis, No edema  Vascular: Peripheral pulses palpable 2+ bilaterally  Psychiatry: Mood & affect appropriate      	    		      I&O's Summary    02 Dec 2020 07:01  -  03 Dec 2020 07:00  --------------------------------------------------------  IN: 0 mL / OUT: 100 mL / NET: -100 mL    	        Imaging:  echo:  Stress test:  Cath:  CT scan:    Labs:                          13.6   15.48 )-----------( 213      ( 03 Dec 2020 06:20 )             41.4       12-03    136  |  101  |  9   ----------------------------<  124<H>  3.6   |  21<L>  |  0.48<L>    Ca    8.9      03 Dec 2020 06:20  Phos  2.8     12-03  Mg     2.0     12-03    TPro  6.6  /  Alb  4.0  /  TBili  0.6  /  DBili  x   /  AST  92<H>  /  ALT  25  /  AlkPhos  61  12-03      CARDIAC MARKERS ( 03 Dec 2020 06:20 )  x     / x     / 895 u/L / 91.30 ng/mL / x      CARDIAC MARKERS ( 03 Dec 2020 00:45 )  x     / x     / 59 u/L / 1.72 ng/mL / x          proBNP  Serum Pro-Brain Natriuretic Peptide: 631.5 pg/mL (12-03 @ 06:20)      Chol,Trig,LDL,HDL,A1C  Cholesterol, Serum: 180 mg/dL (12-03 @ 06:20)  Triglycerides, Serum: 146 mg/dL (12-03 @ 06:20)  HDL Cholesterol, Serum: 56 mg/dL (12-03 @ 06:20)      LIVER FUNCTIONS - ( 03 Dec 2020 06:20 )  Alb: 4.0 g/dL / Pro: 6.6 g/dL / ALK PHOS: 61 u/L / ALT: 25 u/L / AST: 92 u/L / GGT: x             PT/INR - ( 03 Dec 2020 06:20 )   PT: 12.9 SEC;   INR: 1.13          PTT - ( 03 Dec 2020 06:20 )  PTT:32.4 SEC  LABORATORY VALUES	 	                          13.6   15.48 )-----------( 213      ( 03 Dec 2020 06:20 )             41.4       12-03    136  |  101  |  9   ----------------------------<  124<H>  3.6   |  21<L>  |  0.48<L>  12-03    140  |  100  |  14  ----------------------------<  126<H>  3.5   |  22  |  0.62    Ca    8.9      03 Dec 2020 06:20  Ca    9.8      03 Dec 2020 00:45  Phos  2.8     12-03  Mg     2.0     12-03    TPro  6.6  /  Alb  4.0  /  TBili  0.6  /  DBili  x   /  AST  92<H>  /  ALT  25  /  AlkPhos  61  12-03  TPro  7.2  /  Alb  4.7  /  TBili  0.4  /  DBili  x   /  AST  16  /  ALT  20  /  AlkPhos  74  12-03    LIVER FUNCTIONS - ( 03 Dec 2020 06:20 )  Alb: 4.0 g/dL / Pro: 6.6 g/dL / ALK PHOS: 61 u/L / ALT: 25 u/L / AST: 92 u/L / GGT: x           Prothrombin Time, Plasma: 12.9 SEC (12-03 @ 06:20)      CARDIAC MARKERS:  Creatine Kinase, Serum: 895 u/L (12-03 @ 06:20)  Creatine Kinase, Serum: 59 u/L (12-03 @ 00:45)    CKMB: 91.30 ng/mL (12-03 @ 06:20)  CKMB: 1.72 ng/mL (12-03 @ 00:45)    CKMB Relative Index: 10.2 (12-03 @ 06:20)  CKMB Relative Index: Test not performed (12-03 @ 00:45)    Troponin T, High Sensitivity: 1564 ng/L (12-03 @ 06:20)  Troponin T, High Sensitivity: 39 ng/L (12-03 @ 00:45)    Serum Pro-Brain Natriuretic Peptide: 631.5 pg/mL (12-03 @ 06:20)      12-03 @ 06:20  Cholesterol, Serum - 180  Direct LDL- 113  HDL Cholesterol, Serum- 56  Triglycerides, Serum- 146      Thyroid Stimulating Hormone, Serum: 1.57 uIU/mL (12-03 @ 06:20)        CAPILLARY BLOOD GLUCOSE                CBC Full  -  ( 03 Dec 2020 06:20 )  WBC Count : 15.48 K/uL  Hemoglobin : 13.6 g/dL  Hematocrit : 41.4 %  Platelet Count - Automated : 213 K/uL  Mean Cell Volume : 86.6 fL  Mean Cell Hemoglobin : 28.5 pg  Mean Cell Hemoglobin Concentration : 32.9 %  Auto Neutrophil # : 12.33 K/uL  Auto Lymphocyte # : 2.03 K/uL  Auto Monocyte # : 0.92 K/uL  Auto Eosinophil # : 0.04 K/uL  Auto Basophil # : 0.08 K/uL  Auto Neutrophil % : 79.7 %  Auto Lymphocyte % : 13.1 %  Auto Monocyte % : 5.9 %  Auto Eosinophil % : 0.3 %  Auto Basophil % : 0.5 %      12-03    136  |  101  |  9   ----------------------------<  124<H>  3.6   |  21<L>  |  0.48<L>  12-03    140  |  100  |  14  ----------------------------<  126<H>  3.5   |  22  |  0.62    Ca    8.9      03 Dec 2020 06:20  Ca    9.8      03 Dec 2020 00:45  Phos  2.8     12-03  Mg     2.0     12-03    TPro  6.6  /  Alb  4.0  /  TBili  0.6  /  DBili  x   /  AST  92<H>  /  ALT  25  /  AlkPhos  61  12-03  TPro  7.2  /  Alb  4.7  /  TBili  0.4  /  DBili  x   /  AST  16  /  ALT  20  /  AlkPhos  74  12-03    LIVER FUNCTIONS - ( 03 Dec 2020 06:20 )  Alb: 4.0 g/dL / Pro: 6.6 g/dL / ALK PHOS: 61 u/L / ALT: 25 u/L / AST: 92 u/L / GGT: x             Serum Pro-Brain Natriuretic Peptide: 631.5 pg/mL (12-03 @ 06:20)          CAPILLARY BLOOD GLUCOSE          Assesment /Plan:   44 yo female PMH HTN, active smoker, drinl 3-5 vodka/week presents with  intermittent  substernal  chest pain radiating bilaterally to arms and back x 3 days  CTA done and negative for dissection. Ekg shows KATHRYN in anterior leads. s/p LHC with THIAGO to pLAD and mLAD via R radial. EDP33, EF 40%    Neuro;  AxOx3    Pulm;  b/l lung clear      CVS  #STEMI (ST elevation myocardial infarction).    - Patient with ST elevations anterior leads  -Loaded with  mg now, Brilinta 180 mg and heparin 5000 units IVP x 1 in ER  - s/p emergent cardiac cath with THIAGO x 2 pLAD &mLAD.  -Continue ASA 81 mg qd, Brilinta 90 mg qd  -Start Atorvastatin 80 mg QD,   -on metoprolol 25mg BID will titrate as Blood Pressure tolerate  -check lipid profile, TSH, A1C,trend CK/trop      # Hypertension.  -  in ED  - overnight -180  - started on metoprolol 25mg bid   - will add ace-I, once creatinine stable   - DASH diet  -Monitor blood pressure.         #GI;  -protonix 40mg Po daily    Renal:  - void without problem  - S creat low 0.48, will continue to monitor         VTE (venous thromboembolism).   - IMPROVE-DD Assessment completed: <1%  -Heparin sq.                    Chief compliant: intermittent chest pain x 3 days on admission    Interval history/Overnight events: c/o midsternal chest  4/10 soreness overnight. EKG showed improved KATHRYN. tylenol 650mg PO x and protonox given. Elevated BP -170 overnight      Tele events: NSR with frequent AIVR, and bigeminy PVCs     Vital Signs Last 24 Hrs  T(C): 36.5 (03 Dec 2020 03:15), Max: 36.5 (03 Dec 2020 03:15)  T(F): 97.7 (03 Dec 2020 03:15), Max: 97.7 (03 Dec 2020 03:15)  HR: 89 (03 Dec 2020 06:00) (84 - 89)  BP: 176/117 (03 Dec 2020 06:00) (167/112 - 183/110)  BP(mean): 132 (03 Dec 2020 06:00) (121 - 133)  RR: 21 (03 Dec 2020 06:00) (17 - 21)  SpO2: 99% (03 Dec 2020 06:00) (96% - 99%)    Allergies: No Known Allergies          MEDICATIONS  (STANDING):  aspirin enteric coated 81 milliGRAM(s) Oral daily  atorvastatin 80 milliGRAM(s) Oral at bedtime  chlorhexidine 4% Liquid 1 Application(s) Topical <User Schedule>  heparin   Injectable 5000 Unit(s) SubCutaneous every 12 hours  metoprolol tartrate 25 milliGRAM(s) Oral two times a day  nicotine -  14 mG/24Hr(s) Patch 1 patch Transdermal daily  ticagrelor 90 milliGRAM(s) Oral every 12 hours          CONSTITUTIONAL: No fevers, No chills, No fatigue, No weight gain  EYES: No vision changes   ENT: No congestion, No ear pain, No sore throat.  NECK: No pain, No stiffness  RESPIRATORY: No shortness of breath, No cough, No wheezing, No hemoptysis  CARDIOVASCULAR: + chest pain. No palpitations, No BARRIGA, No orthopnea, No paroxysmal nocturnal dyspnea, No pleuritic pain  GASTROINTESTINAL: No abdominal pain, No nausea, No vomiting, No hematemesis, No diarrhea No constipation. No melena  GENITOURINARY: No dysuria, No frequency, No incontinence, No hematuria  NEUROLOGICAL: No dizziness, No lightheadedness, No syncope, No LOC, No headache, No numbness or weakness  MUSCULOSKELETAL: No Edema, No joint pain, No joint swelling.  PSYCHIATRIC: No anxiety, No depression  DERMATOLOGY: No diaphoresis. No itching, No rashes, No pressure ulcers  HEME/LYMPH: No easy bruising, or bleeding gums        PHYSICAL EXAMINATION  Appearance: NAD, no distress  HEENT: Moist Mucous Membranes, Anicteric, PERRL, EOMI  Cardiovascular: Regular rate and rhythm, Normal S1 S2, No JVD, No murmurs  Respiratory: Lungs clear to auscultation. No rales, No rhonchi, No wheezing. No tenderness to palpation  Gastrointestinal:  Soft, Non-tender, + BS  Neurologic: Non-focal, A&Ox3  Skin: Warm and dry, No rashes, No ecchymosis, No cyanosis  Musculoskeletal: No clubbing, No cyanosis, No edema  Vascular: Peripheral pulses palpable 2+ bilaterally  Psychiatry: Mood & affect appropriate      	    		      I&O's Summary    02 Dec 2020 07:01  -  03 Dec 2020 07:00  --------------------------------------------------------  IN: 0 mL / OUT: 100 mL / NET: -100 mL    	        Imaging:  echo:  Stress test:  Cath:  CT scan:    Labs:                          13.6   15.48 )-----------( 213      ( 03 Dec 2020 06:20 )             41.4       12-03    136  |  101  |  9   ----------------------------<  124<H>  3.6   |  21<L>  |  0.48<L>    Ca    8.9      03 Dec 2020 06:20  Phos  2.8     12-03  Mg     2.0     12-03    TPro  6.6  /  Alb  4.0  /  TBili  0.6  /  DBili  x   /  AST  92<H>  /  ALT  25  /  AlkPhos  61  12-03      CARDIAC MARKERS ( 03 Dec 2020 06:20 )  x     / x     / 895 u/L / 91.30 ng/mL / x      CARDIAC MARKERS ( 03 Dec 2020 00:45 )  x     / x     / 59 u/L / 1.72 ng/mL / x          proBNP  Serum Pro-Brain Natriuretic Peptide: 631.5 pg/mL (12-03 @ 06:20)      Chol,Trig,LDL,HDL,A1C  Cholesterol, Serum: 180 mg/dL (12-03 @ 06:20)  Triglycerides, Serum: 146 mg/dL (12-03 @ 06:20)  HDL Cholesterol, Serum: 56 mg/dL (12-03 @ 06:20)      LIVER FUNCTIONS - ( 03 Dec 2020 06:20 )  Alb: 4.0 g/dL / Pro: 6.6 g/dL / ALK PHOS: 61 u/L / ALT: 25 u/L / AST: 92 u/L / GGT: x             PT/INR - ( 03 Dec 2020 06:20 )   PT: 12.9 SEC;   INR: 1.13          PTT - ( 03 Dec 2020 06:20 )  PTT:32.4 SEC  LABORATORY VALUES	 	                          13.6   15.48 )-----------( 213      ( 03 Dec 2020 06:20 )             41.4       12-03    136  |  101  |  9   ----------------------------<  124<H>  3.6   |  21<L>  |  0.48<L>  12-03    140  |  100  |  14  ----------------------------<  126<H>  3.5   |  22  |  0.62    Ca    8.9      03 Dec 2020 06:20  Ca    9.8      03 Dec 2020 00:45  Phos  2.8     12-03  Mg     2.0     12-03    TPro  6.6  /  Alb  4.0  /  TBili  0.6  /  DBili  x   /  AST  92<H>  /  ALT  25  /  AlkPhos  61  12-03  TPro  7.2  /  Alb  4.7  /  TBili  0.4  /  DBili  x   /  AST  16  /  ALT  20  /  AlkPhos  74  12-03    LIVER FUNCTIONS - ( 03 Dec 2020 06:20 )  Alb: 4.0 g/dL / Pro: 6.6 g/dL / ALK PHOS: 61 u/L / ALT: 25 u/L / AST: 92 u/L / GGT: x           Prothrombin Time, Plasma: 12.9 SEC (12-03 @ 06:20)      CARDIAC MARKERS:  Creatine Kinase, Serum: 895 u/L (12-03 @ 06:20)  Creatine Kinase, Serum: 59 u/L (12-03 @ 00:45)    CKMB: 91.30 ng/mL (12-03 @ 06:20)  CKMB: 1.72 ng/mL (12-03 @ 00:45)    CKMB Relative Index: 10.2 (12-03 @ 06:20)  CKMB Relative Index: Test not performed (12-03 @ 00:45)    Troponin T, High Sensitivity: 1564 ng/L (12-03 @ 06:20)  Troponin T, High Sensitivity: 39 ng/L (12-03 @ 00:45)    Serum Pro-Brain Natriuretic Peptide: 631.5 pg/mL (12-03 @ 06:20)      12-03 @ 06:20  Cholesterol, Serum - 180  Direct LDL- 113  HDL Cholesterol, Serum- 56  Triglycerides, Serum- 146      Thyroid Stimulating Hormone, Serum: 1.57 uIU/mL (12-03 @ 06:20)        CAPILLARY BLOOD GLUCOSE                CBC Full  -  ( 03 Dec 2020 06:20 )  WBC Count : 15.48 K/uL  Hemoglobin : 13.6 g/dL  Hematocrit : 41.4 %  Platelet Count - Automated : 213 K/uL  Mean Cell Volume : 86.6 fL  Mean Cell Hemoglobin : 28.5 pg  Mean Cell Hemoglobin Concentration : 32.9 %  Auto Neutrophil # : 12.33 K/uL  Auto Lymphocyte # : 2.03 K/uL  Auto Monocyte # : 0.92 K/uL  Auto Eosinophil # : 0.04 K/uL  Auto Basophil # : 0.08 K/uL  Auto Neutrophil % : 79.7 %  Auto Lymphocyte % : 13.1 %  Auto Monocyte % : 5.9 %  Auto Eosinophil % : 0.3 %  Auto Basophil % : 0.5 %      12-03    136  |  101  |  9   ----------------------------<  124<H>  3.6   |  21<L>  |  0.48<L>  12-03    140  |  100  |  14  ----------------------------<  126<H>  3.5   |  22  |  0.62    Ca    8.9      03 Dec 2020 06:20  Ca    9.8      03 Dec 2020 00:45  Phos  2.8     12-03  Mg     2.0     12-03    TPro  6.6  /  Alb  4.0  /  TBili  0.6  /  DBili  x   /  AST  92<H>  /  ALT  25  /  AlkPhos  61  12-03  TPro  7.2  /  Alb  4.7  /  TBili  0.4  /  DBili  x   /  AST  16  /  ALT  20  /  AlkPhos  74  12-03    LIVER FUNCTIONS - ( 03 Dec 2020 06:20 )  Alb: 4.0 g/dL / Pro: 6.6 g/dL / ALK PHOS: 61 u/L / ALT: 25 u/L / AST: 92 u/L / GGT: x             Serum Pro-Brain Natriuretic Peptide: 631.5 pg/mL (12-03 @ 06:20)          CAPILLARY BLOOD GLUCOSE      Assesment /Plan:   46 yo female PMH HTN, active smoker, drinl 3-5 vodka/week presents with  intermittent  substernal  chest pain radiating bilaterally to arms and back x 3 days  CTA done and negative for dissection. Ekg shows KATHRYN in anterior leads. s/p LHC with THIAGO to pLAD and mLAD via R radial. EDP33, EF 40%    Neuro;  AxOx3    Pulm;  b/l lung clear      CVS  #STEMI (ST elevation myocardial infarction).    - Patient with ST elevations anterior leads  -Loaded with  mg now, Brilinta 180 mg and heparin 5000 units IVP x 1 in ER  - s/p emergent cardiac cath with THIAGO x 2 pLAD &mLAD.  -Continue ASA 81 mg qd, Brilinta 90 mg qd  -Start Atorvastatin 80 mg QD,   -on metoprolol 25mg BID will titrate as Blood Pressure tolerate  - CK/ trop uptrending, will continue to check until peak  - lipid profile, TSH, A1C normal     # Hypertension.  -  in ED  - overnight -180  - started on metoprolol 25mg bid   - will add ace-I, once creatinine stable   - DASH diet  -Monitor blood pressure.         #GI;  -protonix 40mg Po daily    Renal:  - voiding without problem  - S creat low 0.48, will continue to monitor         VTE (venous thromboembolism).   - IMPROVE-DD Assessment completed: <1%  -Heparin sq.                    Chief compliant: intermittent chest pain x 3 days on admission    Interval history/Overnight events: c/o midsternal chest  4/10 soreness overnight. EKG showed improved KATHRYN. tylenol 650mg PO x and protonox given. Elevated BP -170 overnight      Tele events: NSR with frequent AIVR, and bigeminy PVCs     Vital Signs Last 24 Hrs  T(C): 36.5 (03 Dec 2020 03:15), Max: 36.5 (03 Dec 2020 03:15)  T(F): 97.7 (03 Dec 2020 03:15), Max: 97.7 (03 Dec 2020 03:15)  HR: 89 (03 Dec 2020 06:00) (84 - 89)  BP: 176/117 (03 Dec 2020 06:00) (167/112 - 183/110)  BP(mean): 132 (03 Dec 2020 06:00) (121 - 133)  RR: 21 (03 Dec 2020 06:00) (17 - 21)  SpO2: 99% (03 Dec 2020 06:00) (96% - 99%)    Allergies: No Known Allergies          MEDICATIONS  (STANDING):  aspirin enteric coated 81 milliGRAM(s) Oral daily  atorvastatin 80 milliGRAM(s) Oral at bedtime  chlorhexidine 4% Liquid 1 Application(s) Topical <User Schedule>  heparin   Injectable 5000 Unit(s) SubCutaneous every 12 hours  metoprolol tartrate 25 milliGRAM(s) Oral two times a day  nicotine -  14 mG/24Hr(s) Patch 1 patch Transdermal daily  ticagrelor 90 milliGRAM(s) Oral every 12 hours          CONSTITUTIONAL: No fevers, No chills, No fatigue, No weight gain  EYES: No vision changes   ENT: No congestion, No ear pain, No sore throat.  NECK: No pain, No stiffness  RESPIRATORY: No shortness of breath, No cough, No wheezing, No hemoptysis  CARDIOVASCULAR: + chest pain. No palpitations, No BARRIGA, No orthopnea, No paroxysmal nocturnal dyspnea, No pleuritic pain  GASTROINTESTINAL: No abdominal pain, No nausea, No vomiting, No hematemesis, No diarrhea No constipation. No melena  GENITOURINARY: No dysuria, No frequency, No incontinence, No hematuria  NEUROLOGICAL: No dizziness, No lightheadedness, No syncope, No LOC, No headache, No numbness or weakness  MUSCULOSKELETAL: No Edema, No joint pain, No joint swelling.  PSYCHIATRIC: No anxiety, No depression  DERMATOLOGY: No diaphoresis. No itching, No rashes, No pressure ulcers  HEME/LYMPH: No easy bruising, or bleeding gums        PHYSICAL EXAMINATION  Appearance: NAD, no distress  HEENT: Moist Mucous Membranes, Anicteric, PERRL, EOMI  Cardiovascular: Regular rate and rhythm, Normal S1 S2, No JVD, No murmurs  Respiratory: Lungs clear to auscultation. No rales, No rhonchi, No wheezing. No tenderness to palpation  Gastrointestinal:  Soft, Non-tender, + BS  Neurologic: Non-focal, A&Ox3  Skin: Warm and dry, No rashes, No ecchymosis, No cyanosis  Musculoskeletal: No clubbing, No cyanosis, No edema  Vascular: Peripheral pulses palpable 2+ bilaterally  Psychiatry: Mood & affect appropriate      	    		      I&O's Summary    02 Dec 2020 07:01  -  03 Dec 2020 07:00  --------------------------------------------------------  IN: 0 mL / OUT: 100 mL / NET: -100 mL    	        Imaging:  echo:  Stress test:  Cath:  CT scan:    Labs:                          13.6   15.48 )-----------( 213      ( 03 Dec 2020 06:20 )             41.4       12-03    136  |  101  |  9   ----------------------------<  124<H>  3.6   |  21<L>  |  0.48<L>    Ca    8.9      03 Dec 2020 06:20  Phos  2.8     12-03  Mg     2.0     12-03    TPro  6.6  /  Alb  4.0  /  TBili  0.6  /  DBili  x   /  AST  92<H>  /  ALT  25  /  AlkPhos  61  12-03      CARDIAC MARKERS ( 03 Dec 2020 06:20 )  x     / x     / 895 u/L / 91.30 ng/mL / x      CARDIAC MARKERS ( 03 Dec 2020 00:45 )  x     / x     / 59 u/L / 1.72 ng/mL / x          proBNP  Serum Pro-Brain Natriuretic Peptide: 631.5 pg/mL (12-03 @ 06:20)      Chol,Trig,LDL,HDL,A1C  Cholesterol, Serum: 180 mg/dL (12-03 @ 06:20)  Triglycerides, Serum: 146 mg/dL (12-03 @ 06:20)  HDL Cholesterol, Serum: 56 mg/dL (12-03 @ 06:20)      LIVER FUNCTIONS - ( 03 Dec 2020 06:20 )  Alb: 4.0 g/dL / Pro: 6.6 g/dL / ALK PHOS: 61 u/L / ALT: 25 u/L / AST: 92 u/L / GGT: x             PT/INR - ( 03 Dec 2020 06:20 )   PT: 12.9 SEC;   INR: 1.13          PTT - ( 03 Dec 2020 06:20 )  PTT:32.4 SEC  LABORATORY VALUES	 	                          13.6   15.48 )-----------( 213      ( 03 Dec 2020 06:20 )             41.4       12-03    136  |  101  |  9   ----------------------------<  124<H>  3.6   |  21<L>  |  0.48<L>  12-03    140  |  100  |  14  ----------------------------<  126<H>  3.5   |  22  |  0.62    Ca    8.9      03 Dec 2020 06:20  Ca    9.8      03 Dec 2020 00:45  Phos  2.8     12-03  Mg     2.0     12-03    TPro  6.6  /  Alb  4.0  /  TBili  0.6  /  DBili  x   /  AST  92<H>  /  ALT  25  /  AlkPhos  61  12-03  TPro  7.2  /  Alb  4.7  /  TBili  0.4  /  DBili  x   /  AST  16  /  ALT  20  /  AlkPhos  74  12-03    LIVER FUNCTIONS - ( 03 Dec 2020 06:20 )  Alb: 4.0 g/dL / Pro: 6.6 g/dL / ALK PHOS: 61 u/L / ALT: 25 u/L / AST: 92 u/L / GGT: x           Prothrombin Time, Plasma: 12.9 SEC (12-03 @ 06:20)      CARDIAC MARKERS:  Creatine Kinase, Serum: 895 u/L (12-03 @ 06:20)  Creatine Kinase, Serum: 59 u/L (12-03 @ 00:45)    CKMB: 91.30 ng/mL (12-03 @ 06:20)  CKMB: 1.72 ng/mL (12-03 @ 00:45)    CKMB Relative Index: 10.2 (12-03 @ 06:20)  CKMB Relative Index: Test not performed (12-03 @ 00:45)    Troponin T, High Sensitivity: 1564 ng/L (12-03 @ 06:20)  Troponin T, High Sensitivity: 39 ng/L (12-03 @ 00:45)    Serum Pro-Brain Natriuretic Peptide: 631.5 pg/mL (12-03 @ 06:20)      12-03 @ 06:20  Cholesterol, Serum - 180  Direct LDL- 113  HDL Cholesterol, Serum- 56  Triglycerides, Serum- 146      Thyroid Stimulating Hormone, Serum: 1.57 uIU/mL (12-03 @ 06:20)        CAPILLARY BLOOD GLUCOSE                CBC Full  -  ( 03 Dec 2020 06:20 )  WBC Count : 15.48 K/uL  Hemoglobin : 13.6 g/dL  Hematocrit : 41.4 %  Platelet Count - Automated : 213 K/uL  Mean Cell Volume : 86.6 fL  Mean Cell Hemoglobin : 28.5 pg  Mean Cell Hemoglobin Concentration : 32.9 %  Auto Neutrophil # : 12.33 K/uL  Auto Lymphocyte # : 2.03 K/uL  Auto Monocyte # : 0.92 K/uL  Auto Eosinophil # : 0.04 K/uL  Auto Basophil # : 0.08 K/uL  Auto Neutrophil % : 79.7 %  Auto Lymphocyte % : 13.1 %  Auto Monocyte % : 5.9 %  Auto Eosinophil % : 0.3 %  Auto Basophil % : 0.5 %      12-03    136  |  101  |  9   ----------------------------<  124<H>  3.6   |  21<L>  |  0.48<L>  12-03    140  |  100  |  14  ----------------------------<  126<H>  3.5   |  22  |  0.62    Ca    8.9      03 Dec 2020 06:20  Ca    9.8      03 Dec 2020 00:45  Phos  2.8     12-03  Mg     2.0     12-03    TPro  6.6  /  Alb  4.0  /  TBili  0.6  /  DBili  x   /  AST  92<H>  /  ALT  25  /  AlkPhos  61  12-03  TPro  7.2  /  Alb  4.7  /  TBili  0.4  /  DBili  x   /  AST  16  /  ALT  20  /  AlkPhos  74  12-03    LIVER FUNCTIONS - ( 03 Dec 2020 06:20 )  Alb: 4.0 g/dL / Pro: 6.6 g/dL / ALK PHOS: 61 u/L / ALT: 25 u/L / AST: 92 u/L / GGT: x             Serum Pro-Brain Natriuretic Peptide: 631.5 pg/mL (12-03 @ 06:20)          CAPILLARY BLOOD GLUCOSE      Assesment /Plan:   46 yo female PMH HTN, active smoker, drinl 3-5 vodka/week presents with  intermittent  substernal  chest pain radiating bilaterally to arms and back x 3 days  CTA done and negative for dissection. Ekg shows KATHRYN in anterior leads. s/p LHC with THIAGO to pLAD and mLAD via R radial. EDP33, EF 40%    Neuro;  AxOx3    Pulm;  b/l lung clear      CVS  #STEMI (ST elevation myocardial infarction).    - Patient with ST elevations anterior leads  -Loaded with  mg now, Brilinta 180 mg and heparin 5000 units IVP x 1 in ER  - s/p emergent cardiac cath with THIAGO x 2 pLAD &mLAD.  -Continue ASA 81 mg qd, Brilinta 90 mg qd  -Start Atorvastatin 80 mg QD,   -on metoprolol 25mg BID will titrate as Blood Pressure tolerate  - CK/ trop uptrending, will continue to check until peak  - lipid profile, TSH, A1C normal     # Hypertension.  -  in ED  - overnight -180  - started on metoprolol 25mg bid   - will add ace-I, once creatinine stable   - DASH diet  -Monitor blood pressure.         #GI;  -protonix 40mg Po daily    Renal:  - voiding without problem  - S creat low 0.48, will continue to monitor    ID:   + leukocytosis ,WBC 14.47>>15.48  - no fever  -likely in the setting MI  - will  continue to monitor         VTE (venous thromboembolism).   - IMPROVE-DD Assessment completed: <1%  -Heparin sq.                    Chief compliant: intermittent chest pain x 5 days on admission    Interval history/Overnight events: c/o midsternal chest  4/10 soreness overnight. EKG showed improved KATHRYN. tylenol 650mg PO x and protonox given. Elevated BP -170 overnight      Tele events: NSR with frequent AIVR, and bigeminy PVCs     Vital Signs Last 24 Hrs  T(C): 36.5 (03 Dec 2020 03:15), Max: 36.5 (03 Dec 2020 03:15)  T(F): 97.7 (03 Dec 2020 03:15), Max: 97.7 (03 Dec 2020 03:15)  HR: 89 (03 Dec 2020 06:00) (84 - 89)  BP: 176/117 (03 Dec 2020 06:00) (167/112 - 183/110)  BP(mean): 132 (03 Dec 2020 06:00) (121 - 133)  RR: 21 (03 Dec 2020 06:00) (17 - 21)  SpO2: 99% (03 Dec 2020 06:00) (96% - 99%)    Allergies: No Known Allergies          MEDICATIONS  (STANDING):  aspirin enteric coated 81 milliGRAM(s) Oral daily  atorvastatin 80 milliGRAM(s) Oral at bedtime  chlorhexidine 4% Liquid 1 Application(s) Topical <User Schedule>  heparin   Injectable 5000 Unit(s) SubCutaneous every 12 hours  metoprolol tartrate 25 milliGRAM(s) Oral two times a day  nicotine -  14 mG/24Hr(s) Patch 1 patch Transdermal daily  ticagrelor 90 milliGRAM(s) Oral every 12 hours          CONSTITUTIONAL: No fevers, No chills, No fatigue, No weight gain  EYES: No vision changes   ENT: No congestion, No ear pain, No sore throat.  NECK: No pain, No stiffness  RESPIRATORY: No shortness of breath, No cough, No wheezing, No hemoptysis  CARDIOVASCULAR: + chest pain. No palpitations, No BARRIGA, No orthopnea, No paroxysmal nocturnal dyspnea, No pleuritic pain  GASTROINTESTINAL: No abdominal pain, No nausea, No vomiting, No hematemesis, No diarrhea No constipation. No melena  GENITOURINARY: No dysuria, No frequency, No incontinence, No hematuria  NEUROLOGICAL: No dizziness, No lightheadedness, No syncope, No LOC, No headache, No numbness or weakness  MUSCULOSKELETAL: No Edema, No joint pain, No joint swelling.  PSYCHIATRIC: No anxiety, No depression  DERMATOLOGY: No diaphoresis. No itching, No rashes, No pressure ulcers  HEME/LYMPH: No easy bruising, or bleeding gums        PHYSICAL EXAMINATION  Appearance: NAD, no distress  HEENT: Moist Mucous Membranes, Anicteric, PERRL, EOMI  Cardiovascular: Regular rate and rhythm, Normal S1 S2, No JVD, No murmurs  Respiratory: Lungs clear to auscultation. No rales, No rhonchi, No wheezing. No tenderness to palpation  Gastrointestinal:  Soft, Non-tender, + BS  Neurologic: Non-focal, A&Ox3  Skin: Warm and dry, No rashes, No ecchymosis, No cyanosis  Musculoskeletal: No clubbing, No cyanosis, No edema  Vascular: Peripheral pulses palpable 2+ bilaterally  Psychiatry: Mood & affect appropriate      	    		      I&O's Summary    02 Dec 2020 07:01  -  03 Dec 2020 07:00  --------------------------------------------------------  IN: 0 mL / OUT: 100 mL / NET: -100 mL    	        Imaging:  echo:  Stress test:  Cath:  CT scan:    Labs:                          13.6   15.48 )-----------( 213      ( 03 Dec 2020 06:20 )             41.4       12-03    136  |  101  |  9   ----------------------------<  124<H>  3.6   |  21<L>  |  0.48<L>    Ca    8.9      03 Dec 2020 06:20  Phos  2.8     12-03  Mg     2.0     12-03    TPro  6.6  /  Alb  4.0  /  TBili  0.6  /  DBili  x   /  AST  92<H>  /  ALT  25  /  AlkPhos  61  12-03      CARDIAC MARKERS ( 03 Dec 2020 06:20 )  x     / x     / 895 u/L / 91.30 ng/mL / x      CARDIAC MARKERS ( 03 Dec 2020 00:45 )  x     / x     / 59 u/L / 1.72 ng/mL / x          proBNP  Serum Pro-Brain Natriuretic Peptide: 631.5 pg/mL (12-03 @ 06:20)      Chol,Trig,LDL,HDL,A1C  Cholesterol, Serum: 180 mg/dL (12-03 @ 06:20)  Triglycerides, Serum: 146 mg/dL (12-03 @ 06:20)  HDL Cholesterol, Serum: 56 mg/dL (12-03 @ 06:20)      LIVER FUNCTIONS - ( 03 Dec 2020 06:20 )  Alb: 4.0 g/dL / Pro: 6.6 g/dL / ALK PHOS: 61 u/L / ALT: 25 u/L / AST: 92 u/L / GGT: x             PT/INR - ( 03 Dec 2020 06:20 )   PT: 12.9 SEC;   INR: 1.13          PTT - ( 03 Dec 2020 06:20 )  PTT:32.4 SEC  LABORATORY VALUES	 	                          13.6   15.48 )-----------( 213      ( 03 Dec 2020 06:20 )             41.4       12-03    136  |  101  |  9   ----------------------------<  124<H>  3.6   |  21<L>  |  0.48<L>  12-03    140  |  100  |  14  ----------------------------<  126<H>  3.5   |  22  |  0.62    Ca    8.9      03 Dec 2020 06:20  Ca    9.8      03 Dec 2020 00:45  Phos  2.8     12-03  Mg     2.0     12-03    TPro  6.6  /  Alb  4.0  /  TBili  0.6  /  DBili  x   /  AST  92<H>  /  ALT  25  /  AlkPhos  61  12-03  TPro  7.2  /  Alb  4.7  /  TBili  0.4  /  DBili  x   /  AST  16  /  ALT  20  /  AlkPhos  74  12-03    LIVER FUNCTIONS - ( 03 Dec 2020 06:20 )  Alb: 4.0 g/dL / Pro: 6.6 g/dL / ALK PHOS: 61 u/L / ALT: 25 u/L / AST: 92 u/L / GGT: x           Prothrombin Time, Plasma: 12.9 SEC (12-03 @ 06:20)      CARDIAC MARKERS:  Creatine Kinase, Serum: 895 u/L (12-03 @ 06:20)  Creatine Kinase, Serum: 59 u/L (12-03 @ 00:45)    CKMB: 91.30 ng/mL (12-03 @ 06:20)  CKMB: 1.72 ng/mL (12-03 @ 00:45)    CKMB Relative Index: 10.2 (12-03 @ 06:20)  CKMB Relative Index: Test not performed (12-03 @ 00:45)    Troponin T, High Sensitivity: 1564 ng/L (12-03 @ 06:20)  Troponin T, High Sensitivity: 39 ng/L (12-03 @ 00:45)    Serum Pro-Brain Natriuretic Peptide: 631.5 pg/mL (12-03 @ 06:20)      12-03 @ 06:20  Cholesterol, Serum - 180  Direct LDL- 113  HDL Cholesterol, Serum- 56  Triglycerides, Serum- 146      Thyroid Stimulating Hormone, Serum: 1.57 uIU/mL (12-03 @ 06:20)        CAPILLARY BLOOD GLUCOSE                CBC Full  -  ( 03 Dec 2020 06:20 )  WBC Count : 15.48 K/uL  Hemoglobin : 13.6 g/dL  Hematocrit : 41.4 %  Platelet Count - Automated : 213 K/uL  Mean Cell Volume : 86.6 fL  Mean Cell Hemoglobin : 28.5 pg  Mean Cell Hemoglobin Concentration : 32.9 %  Auto Neutrophil # : 12.33 K/uL  Auto Lymphocyte # : 2.03 K/uL  Auto Monocyte # : 0.92 K/uL  Auto Eosinophil # : 0.04 K/uL  Auto Basophil # : 0.08 K/uL  Auto Neutrophil % : 79.7 %  Auto Lymphocyte % : 13.1 %  Auto Monocyte % : 5.9 %  Auto Eosinophil % : 0.3 %  Auto Basophil % : 0.5 %      12-03    136  |  101  |  9   ----------------------------<  124<H>  3.6   |  21<L>  |  0.48<L>  12-03    140  |  100  |  14  ----------------------------<  126<H>  3.5   |  22  |  0.62    Ca    8.9      03 Dec 2020 06:20  Ca    9.8      03 Dec 2020 00:45  Phos  2.8     12-03  Mg     2.0     12-03    TPro  6.6  /  Alb  4.0  /  TBili  0.6  /  DBili  x   /  AST  92<H>  /  ALT  25  /  AlkPhos  61  12-03  TPro  7.2  /  Alb  4.7  /  TBili  0.4  /  DBili  x   /  AST  16  /  ALT  20  /  AlkPhos  74  12-03    LIVER FUNCTIONS - ( 03 Dec 2020 06:20 )  Alb: 4.0 g/dL / Pro: 6.6 g/dL / ALK PHOS: 61 u/L / ALT: 25 u/L / AST: 92 u/L / GGT: x             Serum Pro-Brain Natriuretic Peptide: 631.5 pg/mL (12-03 @ 06:20)          CAPILLARY BLOOD GLUCOSE      Assesment /Plan:   44 yo female PMH HTN, active smoker, drinl 3-5 vodka/week presents with  intermittent  substernal  chest pain radiating bilaterally to arms and back x 5 days.  CTA done and negative for dissection. Ekg shows KATHRYN in anterior leads. s/p LHC with THIAGO to pLAD and mLAD via R radial. EDP33, EF 40%    Neuro;  AxOx3    Pulm;  b/l lung clear      CVS  #STEMI (ST elevation myocardial infarction).    - Patient with ST elevations anterior leads  -Loaded with  mg now, Brilinta 180 mg and heparin 5000 units IVP x 1 in ER  - s/p emergent cardiac cath with THIAGO x 2 pLAD &mLAD.  -Continue ASA 81 mg qd, Brilinta 90 mg qd  -Start Atorvastatin 80 mg QD,   -started on coreg 6.25 mgbid  - CK/ trop uptrending, will continue to check until peak  - lipid profile, TSH, A1C normal     # Hypertension.  -  in ED  - overnight -180  - change metoprolol to coreg 6.125mg  ? cough with ace-I, started on losartan 25mg   - DASH diet  -Monitor blood pressure.         #GI;  -protonix 40mg Po daily    Renal:  - voiding without problem  - S creat low 0.48, will continue to monitor    ID:   + leukocytosis ,WBC 14.47>>15.48  - no fever  -likely in the setting MI  - will  continue to monitor         VTE (venous thromboembolism).   - IMPROVE-DD Assessment completed: <1%  -Heparin sq.                    Chief compliant: intermittent chest pain x 5 days on admission    Interval history/Overnight events: c/o midsternal chest  4/10 soreness overnight. EKG showed improved KATHRYN. Tylenol 650mg PO x and protonox given. Elevated BP -170 overnight      Tele events: NSR with frequent AIVR, and bigeminy PVCs     Vital Signs Last 24 Hrs  T(C): 36.5 (03 Dec 2020 03:15), Max: 36.5 (03 Dec 2020 03:15)  T(F): 97.7 (03 Dec 2020 03:15), Max: 97.7 (03 Dec 2020 03:15)  HR: 89 (03 Dec 2020 06:00) (84 - 89)  BP: 176/117 (03 Dec 2020 06:00) (167/112 - 183/110)  BP(mean): 132 (03 Dec 2020 06:00) (121 - 133)  RR: 21 (03 Dec 2020 06:00) (17 - 21)  SpO2: 99% (03 Dec 2020 06:00) (96% - 99%)    Allergies: No Known Allergies          MEDICATIONS  (STANDING):  aspirin enteric coated 81 milliGRAM(s) Oral daily  atorvastatin 80 milliGRAM(s) Oral at bedtime  chlorhexidine 4% Liquid 1 Application(s) Topical <User Schedule>  heparin   Injectable 5000 Unit(s) SubCutaneous every 12 hours  metoprolol tartrate 25 milliGRAM(s) Oral two times a day  nicotine -  14 mG/24Hr(s) Patch 1 patch Transdermal daily  ticagrelor 90 milliGRAM(s) Oral every 12 hours          CONSTITUTIONAL: No fevers, No chills, No fatigue, No weight gain  EYES: No vision changes   ENT: No congestion, No ear pain, No sore throat.  NECK: No pain, No stiffness  RESPIRATORY: No shortness of breath, No cough, No wheezing, No hemoptysis  CARDIOVASCULAR: + chest pain. No palpitations, No BARRIGA, No orthopnea, No paroxysmal nocturnal dyspnea, No pleuritic pain  GASTROINTESTINAL: No abdominal pain, No nausea, No vomiting, No hematemesis, No diarrhea No constipation. No melena  GENITOURINARY: No dysuria, No frequency, No incontinence, No hematuria  NEUROLOGICAL: No dizziness, No lightheadedness, No syncope, No LOC, No headache, No numbness or weakness  MUSCULOSKELETAL: No Edema, No joint pain, No joint swelling.  PSYCHIATRIC: No anxiety, No depression  DERMATOLOGY: No diaphoresis. No itching, No rashes, No pressure ulcers  HEME/LYMPH: No easy bruising, or bleeding gums        PHYSICAL EXAMINATION  Appearance: NAD, no distress  HEENT: Moist Mucous Membranes, Anicteric, PERRL, EOMI  Cardiovascular: Regular rate and rhythm, Normal S1 S2, No JVD, No murmurs  Respiratory: Lungs clear to auscultation. No rales, No rhonchi, No wheezing. No tenderness to palpation  Gastrointestinal:  Soft, Non-tender, + BS  Neurologic: Non-focal, A&Ox3  Skin: Warm and dry, No rashes, No ecchymosis, No cyanosis  Musculoskeletal: No clubbing, No cyanosis, No edema  Vascular: Peripheral pulses palpable 2+ bilaterally  Psychiatry: Mood & affect appropriate      	    		      I&O's Summary    02 Dec 2020 07:01  -  03 Dec 2020 07:00  --------------------------------------------------------  IN: 0 mL / OUT: 100 mL / NET: -100 mL    	        Imaging:  echo:  Stress test:  Cath:  CT scan:    Labs:                          13.6   15.48 )-----------( 213      ( 03 Dec 2020 06:20 )             41.4       12-03    136  |  101  |  9   ----------------------------<  124<H>  3.6   |  21<L>  |  0.48<L>    Ca    8.9      03 Dec 2020 06:20  Phos  2.8     12-03  Mg     2.0     12-03    TPro  6.6  /  Alb  4.0  /  TBili  0.6  /  DBili  x   /  AST  92<H>  /  ALT  25  /  AlkPhos  61  12-03      CARDIAC MARKERS ( 03 Dec 2020 06:20 )  x     / x     / 895 u/L / 91.30 ng/mL / x      CARDIAC MARKERS ( 03 Dec 2020 00:45 )  x     / x     / 59 u/L / 1.72 ng/mL / x          proBNP  Serum Pro-Brain Natriuretic Peptide: 631.5 pg/mL (12-03 @ 06:20)      Chol,Trig,LDL,HDL,A1C  Cholesterol, Serum: 180 mg/dL (12-03 @ 06:20)  Triglycerides, Serum: 146 mg/dL (12-03 @ 06:20)  HDL Cholesterol, Serum: 56 mg/dL (12-03 @ 06:20)      LIVER FUNCTIONS - ( 03 Dec 2020 06:20 )  Alb: 4.0 g/dL / Pro: 6.6 g/dL / ALK PHOS: 61 u/L / ALT: 25 u/L / AST: 92 u/L / GGT: x             PT/INR - ( 03 Dec 2020 06:20 )   PT: 12.9 SEC;   INR: 1.13          PTT - ( 03 Dec 2020 06:20 )  PTT:32.4 SEC  LABORATORY VALUES	 	                          13.6   15.48 )-----------( 213      ( 03 Dec 2020 06:20 )             41.4       12-03    136  |  101  |  9   ----------------------------<  124<H>  3.6   |  21<L>  |  0.48<L>  12-03    140  |  100  |  14  ----------------------------<  126<H>  3.5   |  22  |  0.62    Ca    8.9      03 Dec 2020 06:20  Ca    9.8      03 Dec 2020 00:45  Phos  2.8     12-03  Mg     2.0     12-03    TPro  6.6  /  Alb  4.0  /  TBili  0.6  /  DBili  x   /  AST  92<H>  /  ALT  25  /  AlkPhos  61  12-03  TPro  7.2  /  Alb  4.7  /  TBili  0.4  /  DBili  x   /  AST  16  /  ALT  20  /  AlkPhos  74  12-03    LIVER FUNCTIONS - ( 03 Dec 2020 06:20 )  Alb: 4.0 g/dL / Pro: 6.6 g/dL / ALK PHOS: 61 u/L / ALT: 25 u/L / AST: 92 u/L / GGT: x           Prothrombin Time, Plasma: 12.9 SEC (12-03 @ 06:20)      CARDIAC MARKERS:  Creatine Kinase, Serum: 895 u/L (12-03 @ 06:20)  Creatine Kinase, Serum: 59 u/L (12-03 @ 00:45)    CKMB: 91.30 ng/mL (12-03 @ 06:20)  CKMB: 1.72 ng/mL (12-03 @ 00:45)    CKMB Relative Index: 10.2 (12-03 @ 06:20)  CKMB Relative Index: Test not performed (12-03 @ 00:45)    Troponin T, High Sensitivity: 1564 ng/L (12-03 @ 06:20)  Troponin T, High Sensitivity: 39 ng/L (12-03 @ 00:45)    Serum Pro-Brain Natriuretic Peptide: 631.5 pg/mL (12-03 @ 06:20)      12-03 @ 06:20  Cholesterol, Serum - 180  Direct LDL- 113  HDL Cholesterol, Serum- 56  Triglycerides, Serum- 146      Thyroid Stimulating Hormone, Serum: 1.57 uIU/mL (12-03 @ 06:20)        CAPILLARY BLOOD GLUCOSE                CBC Full  -  ( 03 Dec 2020 06:20 )  WBC Count : 15.48 K/uL  Hemoglobin : 13.6 g/dL  Hematocrit : 41.4 %  Platelet Count - Automated : 213 K/uL  Mean Cell Volume : 86.6 fL  Mean Cell Hemoglobin : 28.5 pg  Mean Cell Hemoglobin Concentration : 32.9 %  Auto Neutrophil # : 12.33 K/uL  Auto Lymphocyte # : 2.03 K/uL  Auto Monocyte # : 0.92 K/uL  Auto Eosinophil # : 0.04 K/uL  Auto Basophil # : 0.08 K/uL  Auto Neutrophil % : 79.7 %  Auto Lymphocyte % : 13.1 %  Auto Monocyte % : 5.9 %  Auto Eosinophil % : 0.3 %  Auto Basophil % : 0.5 %      12-03    136  |  101  |  9   ----------------------------<  124<H>  3.6   |  21<L>  |  0.48<L>  12-03    140  |  100  |  14  ----------------------------<  126<H>  3.5   |  22  |  0.62    Ca    8.9      03 Dec 2020 06:20  Ca    9.8      03 Dec 2020 00:45  Phos  2.8     12-03  Mg     2.0     12-03    TPro  6.6  /  Alb  4.0  /  TBili  0.6  /  DBili  x   /  AST  92<H>  /  ALT  25  /  AlkPhos  61  12-03  TPro  7.2  /  Alb  4.7  /  TBili  0.4  /  DBili  x   /  AST  16  /  ALT  20  /  AlkPhos  74  12-03    LIVER FUNCTIONS - ( 03 Dec 2020 06:20 )  Alb: 4.0 g/dL / Pro: 6.6 g/dL / ALK PHOS: 61 u/L / ALT: 25 u/L / AST: 92 u/L / GGT: x             Serum Pro-Brain Natriuretic Peptide: 631.5 pg/mL (12-03 @ 06:20)          CAPILLARY BLOOD GLUCOSE      Assesment /Plan:   46 yo female PMH HTN, active smoker, drinl 3-5 vodka/week presents with  intermittent  substernal  chest pain radiating bilaterally to arms and back x 5 days.  CTA done and negative for dissection. Ekg shows KATHRYN in anterior leads. s/p LHC with THIAGO to pLAD and mLAD via R radial. EDP33, EF 40%    Neuro;  AxOx3    Pulm;  b/l lung clear      CVS  #STEMI (ST elevation myocardial infarction).    - Patient with ST elevations anterior leads  -Loaded with  mg now, Brilinta 180 mg and heparin 5000 units IVP x 1 in ER  - s/p emergent cardiac cath with THIAGO x 2 pLAD &mLAD.  -Continue ASA 81 mg qd, Brilinta 90 mg qd  -Start Atorvastatin 80 mg QD,   -started on coreg 6.25 mgbid  - CK/ trop uptrending, will continue to check until peak  - lipid profile, TSH, A1C normal   - chest pressure with Hypertension -190, started on nitro drip    # Hypertension.  -  in ED  - overnight -180  - change metoprolol to coreg 12.5mg   cough with? ace-I, started on losartan 25mg   - DASH diet  -Monitor blood pressure.         #GI;  -protonix 40mg Po daily    Renal:  - voiding without problem  - S creat low 0.48, will continue to monitor    ID:   + leukocytosis ,WBC 14.47>>15.48  - no fever  -likely in the setting MI  - will  continue to monitor         VTE (venous thromboembolism).   - IMPROVE-DD Assessment completed: <1%  -Heparin sq.

## 2020-12-03 NOTE — H&P ADULT - NSHPPHYSICALEXAM_GEN_ALL_CORE
Appearance: Normal	  Cardiovascular: Normal S1 S2, No JVD, No murmurs, No edema  Respiratory: Lungs clear to auscultation	  Psychiatry: A & O x 3, Mood & affect appropriate  Gastrointestinal:  Soft, Non-tender, + BS	  Skin: No rashes, No ecchymoses, No cyanosis	  Neurologic: Non-focal, A&Ox3, nonfocal, CALDERON x 4  Extremities: Normal range of motion, No clubbing, cyanosis or edema  Vascular: Peripheral pulses palpable 2+ bilaterally

## 2020-12-03 NOTE — H&P ADULT - PROBLEM SELECTOR PLAN 1
Patient with ST elevations anterior leads  AGATHA score:  Loaded with  mg now, Brilinta 180 mg and heparin 5000 units IVP x 1 in ER  Discussed case with interventionalist. Patient s/p emergent cardiac cath with THIAGO x 2 P &mLAD.   Admit to CCU  Continue ASA 81 mg qd, Brilinta 90 mg qd  Start Atorvastatin 80 mg QD,   Introduce beta blocker and acei as tolerated     Admission labs include serial cardiac enzymes, risk factor profile to include TSH, HGBA1c, Lipid profile, CMP, Mag, Phos, CBC, pBNP  Serial EKG, PRN chest pain  Echo in AM to evaluate LV function and wall motion abnormality

## 2020-12-03 NOTE — ED PROVIDER NOTE - OBJECTIVE STATEMENT
46 y/o F with h/o HTN, active smoker here with chest pain.  Pt reports intermittent chest pain since Saturday with exertion.  Tonight at work (pt works as a ) around 8pm began having severe substernal chest pain, bilateral shoulder pain, and back pain.  Pt left work early and took tylenol/pepto bismol/mylanta/prevacid when she got home with no relief.  Denies fever, chills, cough, SOB, palpitations, n/v, abd pain, leg pain or swelling.  No known personal cardiac hx.

## 2020-12-03 NOTE — H&P ADULT - PROBLEM SELECTOR PLAN 2
Patient with h/o hypertension and is on Losartan  Introduce beta blocker and acei as tolerated   DASH diet  Monitor blood pressure

## 2020-12-03 NOTE — SBIRT NOTE ADULT - NSSBIRTALCPOSREINDET_GEN_A_CORE
Provided SBIRT services: Full screen Positive. Positive reinforcement provided given patient currently within healthy guidelines. Education materials reviewed and given to patient. [facility] information: [address] [place] [address] [place] [address] p: [phone] reviewed.

## 2020-12-03 NOTE — ED PROVIDER NOTE - CRITICAL CARE PROVIDED
consult w/ pt's family directly relating to pts condition/additional history taking/interpretation of diagnostic studies/consultation with other physicians

## 2020-12-03 NOTE — H&P ADULT - ASSESSMENT
44 yo female PMH HTN, active smoker presents with chest pain. Reports pain started 4 days ago. Describes pain as substernal radiating bilaterally to arms and back. The pain returned 2 days later and again last night. In ED CXR with widened mediastinum, CTA done and negative for dissection. ekg shows KATHRYN in anterior leads. Cath lab activated. Pt loaded with aspirin, Brilinta and heparin per ACS protocol. Pt now s/p LHC with THIAGO to pLAD and mLAD. EDP33, EF 40%. Transfer to CCU

## 2020-12-03 NOTE — CHART NOTE - NSCHARTNOTEFT_GEN_A_CORE
RADIAL BAND REMOVAL NOTE    Right radial band removed without any complications. No bleeding or hematoma. Positive peripheral pulses. Good capillary refill. RN to monitor site for bleeding and hematoma. Patient educated on wrist precautions for 72 hours.

## 2020-12-03 NOTE — CHART NOTE - NSCHARTNOTEFT_GEN_A_CORE
ISTOP CHART NOTE: This report was requested by: Arely Jacques | Reference #: [id number]       Rx Written 10/20/2020     Rx Dispensed 10/20/2020    Drug lorazepam 0.5 mg tablet     Quantity 10    Days Supply 10    Prescriber Name Sania Gillespie MD    Dispenser [facility] [id number]

## 2020-12-03 NOTE — PROGRESS NOTE ADULT - ASSESSMENT
FELLOW NOTE    45F HTN, tobacco use here with chest pain, found to have AWSTEMI. S/p THIAGO to prox and mid LAD.     Overnight on tele have AIVR and bigeminy.    CAD s/p THIAGO to pLAD and mLAD: on asa, brilinta, lipitor 80. lopressor 25 BID which we'll switch to coreg for better HTN control. Will start losartan (has cough with lisinopril at home). Echo pending.     Uzma Farris MD  Cardiology Fellow, PGY-4  [phone]  For all other Cardiology service contact information, go to [email or web address] and use "[email or web address]" to login.

## 2020-12-04 ENCOUNTER — TRANSCRIPTION ENCOUNTER (OUTPATIENT)
Age: 45
End: 2020-12-04

## 2020-12-04 LAB
ANION GAP SERPL CALC-SCNC: 12 MMO/L — SIGNIFICANT CHANGE UP (ref 7–14)
BUN SERPL-MCNC: 6 MG/DL — LOW (ref 7–23)
CALCIUM SERPL-MCNC: 9.1 MG/DL — SIGNIFICANT CHANGE UP (ref 8.4–10.5)
CHLORIDE SERPL-SCNC: 101 MMOL/L — SIGNIFICANT CHANGE UP (ref 98–107)
CK MB BLD-MCNC: 25.64 NG/ML — HIGH (ref 1–4.7)
CK MB BLD-MCNC: 5.8 — HIGH (ref 0–2.5)
CK SERPL-CCNC: 443 U/L — HIGH (ref 25–170)
CO2 SERPL-SCNC: 22 MMOL/L — SIGNIFICANT CHANGE UP (ref 22–31)
CREAT SERPL-MCNC: 0.57 MG/DL — SIGNIFICANT CHANGE UP (ref 0.5–1.3)
GLUCOSE SERPL-MCNC: 123 MG/DL — HIGH (ref 70–99)
HCT VFR BLD CALC: 44.1 % — SIGNIFICANT CHANGE UP (ref 34.5–45)
HGB BLD-MCNC: 13.9 G/DL — SIGNIFICANT CHANGE UP (ref 11.5–15.5)
MAGNESIUM SERPL-MCNC: 2 MG/DL — SIGNIFICANT CHANGE UP (ref 1.6–2.6)
MCHC RBC-ENTMCNC: 28 PG — SIGNIFICANT CHANGE UP (ref 27–34)
MCHC RBC-ENTMCNC: 31.5 % — LOW (ref 32–36)
MCV RBC AUTO: 88.7 FL — SIGNIFICANT CHANGE UP (ref 80–100)
NRBC # FLD: 0 K/UL — SIGNIFICANT CHANGE UP (ref 0–0)
PLATELET # BLD AUTO: 237 K/UL — SIGNIFICANT CHANGE UP (ref 150–400)
PMV BLD: 11.2 FL — SIGNIFICANT CHANGE UP (ref 7–13)
POTASSIUM SERPL-MCNC: 3.6 MMOL/L — SIGNIFICANT CHANGE UP (ref 3.5–5.3)
POTASSIUM SERPL-SCNC: 3.6 MMOL/L — SIGNIFICANT CHANGE UP (ref 3.5–5.3)
RBC # BLD: 4.97 M/UL — SIGNIFICANT CHANGE UP (ref 3.8–5.2)
RBC # FLD: 14 % — SIGNIFICANT CHANGE UP (ref 10.3–14.5)
SODIUM SERPL-SCNC: 135 MMOL/L — SIGNIFICANT CHANGE UP (ref 135–145)
TROPONIN T, HIGH SENSITIVITY: 1078 NG/L — CRITICAL HIGH (ref ?–14)
WBC # BLD: 12.27 K/UL — HIGH (ref 3.8–10.5)
WBC # FLD AUTO: 12.27 K/UL — HIGH (ref 3.8–10.5)

## 2020-12-04 PROCEDURE — 99233 SBSQ HOSP IP/OBS HIGH 50: CPT

## 2020-12-04 RX ORDER — NICOTINE POLACRILEX 2 MG
1 GUM BUCCAL
Qty: 0 | Refills: 0 | DISCHARGE
Start: 2020-12-04

## 2020-12-04 RX ORDER — CARVEDILOL PHOSPHATE 80 MG/1
1 CAPSULE, EXTENDED RELEASE ORAL
Qty: 0 | Refills: 0 | DISCHARGE
Start: 2020-12-04

## 2020-12-04 RX ORDER — LOSARTAN POTASSIUM 100 MG/1
1 TABLET, FILM COATED ORAL
Qty: 0 | Refills: 0 | DISCHARGE
Start: 2020-12-04

## 2020-12-04 RX ORDER — TICAGRELOR 90 MG/1
1 TABLET ORAL
Qty: 60 | Refills: 0
Start: 2020-12-04 | End: 2021-01-02

## 2020-12-04 RX ORDER — ATORVASTATIN CALCIUM 80 MG/1
1 TABLET, FILM COATED ORAL
Qty: 0 | Refills: 0 | DISCHARGE
Start: 2020-12-04

## 2020-12-04 RX ORDER — LOSARTAN POTASSIUM 100 MG/1
1 TABLET, FILM COATED ORAL
Qty: 0 | Refills: 0 | DISCHARGE

## 2020-12-04 RX ORDER — ASPIRIN/CALCIUM CARB/MAGNESIUM 324 MG
1 TABLET ORAL
Qty: 0 | Refills: 0 | DISCHARGE
Start: 2020-12-04

## 2020-12-04 RX ORDER — POTASSIUM CHLORIDE 20 MEQ
40 PACKET (EA) ORAL ONCE
Refills: 0 | Status: COMPLETED | OUTPATIENT
Start: 2020-12-04 | End: 2020-12-04

## 2020-12-04 RX ADMIN — Medication 0.5 MILLIGRAM(S): at 19:11

## 2020-12-04 RX ADMIN — TICAGRELOR 90 MILLIGRAM(S): 90 TABLET ORAL at 05:10

## 2020-12-04 RX ADMIN — HEPARIN SODIUM 5000 UNIT(S): 5000 INJECTION INTRAVENOUS; SUBCUTANEOUS at 17:24

## 2020-12-04 RX ADMIN — Medication 1 PATCH: at 11:38

## 2020-12-04 RX ADMIN — Medication 81 MILLIGRAM(S): at 11:39

## 2020-12-04 RX ADMIN — CHLORHEXIDINE GLUCONATE 1 APPLICATION(S): 213 SOLUTION TOPICAL at 05:15

## 2020-12-04 RX ADMIN — Medication 9 MILLIGRAM(S): at 21:02

## 2020-12-04 RX ADMIN — HEPARIN SODIUM 5000 UNIT(S): 5000 INJECTION INTRAVENOUS; SUBCUTANEOUS at 05:10

## 2020-12-04 RX ADMIN — PANTOPRAZOLE SODIUM 40 MILLIGRAM(S): 20 TABLET, DELAYED RELEASE ORAL at 05:10

## 2020-12-04 RX ADMIN — Medication 1 PATCH: at 19:00

## 2020-12-04 RX ADMIN — TICAGRELOR 90 MILLIGRAM(S): 90 TABLET ORAL at 17:24

## 2020-12-04 RX ADMIN — CARVEDILOL PHOSPHATE 12.5 MILLIGRAM(S): 80 CAPSULE, EXTENDED RELEASE ORAL at 17:24

## 2020-12-04 RX ADMIN — CARVEDILOL PHOSPHATE 12.5 MILLIGRAM(S): 80 CAPSULE, EXTENDED RELEASE ORAL at 05:10

## 2020-12-04 RX ADMIN — Medication 1 PATCH: at 07:19

## 2020-12-04 RX ADMIN — LOSARTAN POTASSIUM 50 MILLIGRAM(S): 100 TABLET, FILM COATED ORAL at 05:09

## 2020-12-04 RX ADMIN — Medication 40 MILLIEQUIVALENT(S): at 08:38

## 2020-12-04 RX ADMIN — Medication 1 PATCH: at 11:39

## 2020-12-04 RX ADMIN — ATORVASTATIN CALCIUM 80 MILLIGRAM(S): 80 TABLET, FILM COATED ORAL at 21:02

## 2020-12-04 NOTE — DISCHARGE NOTE PROVIDER - NSDCMRMEDTOKEN_GEN_ALL_CORE_FT
LORazepam 0.5 mg oral tablet: 1 tab(s) orally once a day, As Needed  losartan 25 mg oral tablet: 1 tab(s) orally once a day   aspirin 81 mg oral delayed release tablet: 1 tab(s) orally once a day  atorvastatin 80 mg oral tablet: 1 tab(s) orally once a day (at bedtime)  Brilinta (ticagrelor) 90 mg oral tablet: 1 tab(s) orally every 12 hours   carvedilol 12.5 mg oral tablet: 1 tab(s) orally every 12 hours  LORazepam 0.5 mg oral tablet: 1 tab(s) orally once a day, As Needed  losartan 100 mg oral tablet: 1 tab(s) orally once a day  nicotine 14 mg/24 hr transdermal film, extended release: 1 patch transdermal once a day  pantoprazole 40 mg oral delayed release tablet: 1 tab(s) orally once a day (before a meal)

## 2020-12-04 NOTE — PROGRESS NOTE ADULT - ASSESSMENT
FELLOW NOTE:    45F HTN, tobacco use here with chest pain, found to have AWSTEMI. S/p THIAGO to prox and mid LAD.     Patient received ativan and melatonin last night, and now without any chest pressure.     CAD s/p THIAGO to pLAD and mLAD: on asa, brilinta, lipitor 80. Coreg 12.5. losartan 25. Patient now off nitro gtt. Echo showed moderate to severe segmental LV systolic dysfunction with hypokinesis inferiorly. Trop now downtrending. Smoking cessation counseling. Will need 72 hour echo on Sunday.   Leukocytosis: improving, likely 2/2 STEMI.   Anxiety: continue home prn ativan    Uzma Farris MD  Cardiology Fellow, PGY-4  [phone]  For all other Cardiology service contact information, go to [email or web address] and use "[email or web address]" to login.

## 2020-12-04 NOTE — DISCHARGE NOTE PROVIDER - NSDCCPCAREPLAN_GEN_ALL_CORE_FT
PRINCIPAL DISCHARGE DIAGNOSIS  Diagnosis: STEMI (ST elevation myocardial infarction)  Assessment and Plan of Treatment: You had a heart attack, and were taken to the cath lab and had cardiac catheterization with 2 stents placed. Continue on your Brilinta and ASA, and follow up outpatient with your Cardiologist Dr Linares in 1 week.    Monitor site of procedure for swelling, redness or bleeding. Please notify your doctor if any of these symptoms are noted. No heavy lifting with procedure wrist greater than 5-10 lbs for one week. No strenuous activity for 3 weeks. You may shower.        SECONDARY DISCHARGE DIAGNOSES  Diagnosis: CAD (coronary artery disease)  Assessment and Plan of Treatment: Please continue your statin medication to control cholesterol levels, as well as, Aspirin, and Brilinta as prescribed in order to optimize your heart health. Follow-up with your primary provider/cardiologist as outpatient for ongoing care. If you have persistent chest pain, shortness of breath, heart palpitations, or dizziness you should return to the emergency room.      Diagnosis: Anxiety  Assessment and Plan of Treatment: Continue your medications as directed and please follow-up as an outpatient with your primary care provider for further care and recommendations.      Diagnosis: Smoker  Assessment and Plan of Treatment:     Diagnosis: HTN (hypertension)  Assessment and Plan of Treatment: Continue blood pressure medication regimen as directed. Monitor for any visual changes, headaches or dizziness.  Monitor blood pressure regularly.  Follow up with your Cardiologist for further management for high blood pressure.      Diagnosis: HLD (hyperlipidemia)  Assessment and Plan of Treatment: Continue cholesterol control medications. Continue DASH diet. Follow up with your PCP within 1 week of discharge for further management and monitoring of lipid and cholesterol panels.       PRINCIPAL DISCHARGE DIAGNOSIS  Diagnosis: STEMI (ST elevation myocardial infarction)  Assessment and Plan of Treatment: You had a myocardial infarction otherwise known as a heart attack, and was taken to the cath lab and had cardiac catheterization with 2 stents placed. Continue on your Brilinta and ASA, (otherwise your stents will close up ) medication as directed, and follow up outpatient with your Cardiologist Dr Linares in 1 week.    Monitor site of procedure for swelling, redness or bleeding. Please notify your doctor if any of these symptoms are noted. No heavy lifting with procedure wrist greater than 5-10 lbs for one week. No strenuous activity for 3 weeks. You may shower.        SECONDARY DISCHARGE DIAGNOSES  Diagnosis: CAD (coronary artery disease)  Assessment and Plan of Treatment: Please continue your statin medication to control cholesterol levels, as well as, Aspirin, and Brilinta as prescribed in order to optimize your heart health. Follow-up with your primary provider/cardiologist as outpatient for ongoing care. If you have persistent chest pain, shortness of breath, heart palpitations, or dizziness you should return to the emergency room.      Diagnosis: Anxiety  Assessment and Plan of Treatment: Continue your medications as directed and please follow-up as an outpatient with your primary care provider for further care and recommendations.      Diagnosis: Smoker  Assessment and Plan of Treatment: You have beed prescribed a nicotine patch and   Informed pt of the various negative side effects of smoking including risk of COPD, Lung Ca etc.  Its been strongly recommended that you stop smoking . You may receive Free Help to Quit Smoking through www.cdc.gov/quit , or you may call the [facility] Smoker's Quitline at 1-651-JVQUITS   -272.448.1875)or visit www. AllPlayers.com.AdChoice.    The Quitline is a free service that provides [place] residents with help when they are ready to stop using tobacco. The Quitline is stafed by Quit Coaches who are specially trained to provide information and coaching on a variety of quitting tobacco use topics, such as stop smoking medications, withdrawal symptoms and developing a quit plan.      Diagnosis: HTN (hypertension)  Assessment and Plan of Treatment: Continue blood pressure medication regimen as directed. Monitor for any visual changes, headaches or dizziness.  Monitor blood pressure regularly.  Follow up with your Cardiologist for further management for high blood pressure.      Diagnosis: HLD (hyperlipidemia)  Assessment and Plan of Treatment: Continue cholesterol control medications. Continue DASH diet. Follow up with your PCP within 1 week of discharge for further management and monitoring of lipid and cholesterol panels.

## 2020-12-04 NOTE — PROGRESS NOTE ADULT - SUBJECTIVE AND OBJECTIVE BOX
Chief compliant: chest pain x 5days    Interval history/Overnight events:    Tele events:    Vital Signs Last 24 Hrs  T(C): 36.8 (04 Dec 2020 04:00), Max: 36.8 (04 Dec 2020 04:00)  T(F): 98.2 (04 Dec 2020 04:00), Max: 98.2 (04 Dec 2020 04:00)  HR: 92 (04 Dec 2020 07:00) (79 - 102)  BP: 112/78 (04 Dec 2020 07:00) (112/78 - 194/122)  BP(mean): 86 (04 Dec 2020 07:00) (86 - 144)  RR: 19 (04 Dec 2020 07:00) (13 - 24)  SpO2: 97% (04 Dec 2020 07:00) (92% - 99%)    Allergies: no Allergy           MEDICATIONS  (STANDING):  aspirin enteric coated 81 milliGRAM(s) Oral daily  atorvastatin 80 milliGRAM(s) Oral at bedtime  carvedilol 12.5 milliGRAM(s) Oral every 12 hours  chlorhexidine 4% Liquid 1 Application(s) Topical <User Schedule>  heparin   Injectable 5000 Unit(s) SubCutaneous every 12 hours  losartan 50 milliGRAM(s) Oral daily  melatonin 9 milliGRAM(s) Oral at bedtime  nicotine -  14 mG/24Hr(s) Patch 1 patch Transdermal daily  nitroglycerin  Infusion 10 MICROgram(s)/Min (3 mL/Hr) IV Continuous <Continuous>  pantoprazole    Tablet 40 milliGRAM(s) Oral before breakfast  potassium chloride    Tablet ER 40 milliEquivalent(s) Oral once  ticagrelor 90 milliGRAM(s) Oral every 12 hours    MEDICATIONS  (PRN):  LORazepam     Tablet 0.5 milliGRAM(s) Oral daily PRN Anxiety      CONSTITUTIONAL: No fevers, No chills, No fatigue, No weight gain  EYES: No vision changes   ENT: No congestion, No ear pain, No sore throat.  NECK: No pain, No stiffness  RESPIRATORY: No shortness of breath, No cough, No wheezing, No hemoptysis  CARDIOVASCULAR: No chest pain. No palpitations, No BARRIGA, No orthopnea, No paroxysmal nocturnal dyspnea, No pleuritic pain  GASTROINTESTINAL: No abdominal pain, No nausea, No vomiting, No hematemesis, No diarrhea No constipation. No melena  GENITOURINARY: No dysuria, No frequency, No incontinence, No hematuria  NEUROLOGICAL: No dizziness, No lightheadedness, No syncope, No LOC, No headache, No numbness or weakness  MUSCULOSKELETAL: No Edema, No joint pain, No joint swelling.  PSYCHIATRIC: No anxiety, No depression  DERMATOLOGY: No diaphoresis. No itching, No rashes, No pressure ulcers  HEME/LYMPH: No easy bruising, or bleeding gums        PHYSICAL EXAMINATION  Appearance: NAD, no distress  HEENT: Moist Mucous Membranes, Anicteric, PERRL, EOMI  Cardiovascular: Regular rate and rhythm, Normal S1 S2, No JVD, No murmurs  Respiratory: Lungs clear to auscultation. No rales, No rhonchi, No wheezing. No tenderness to palpation  Gastrointestinal:  Soft, Non-tender, + BS  Neurologic: Non-focal, A&Ox3  Skin: Warm and dry, No rashes, No ecchymosis, No cyanosis  Musculoskeletal: No clubbing, No cyanosis, No edema  Vascular: Peripheral pulses palpable 2+ bilaterally  Psychiatry: Mood & affect appropriate      	    		      I&O's Summary    03 Dec 2020 07:01  -  04 Dec 2020 07:00  --------------------------------------------------------  IN: 90 mL / OUT: 1100 mL / NET: -1010 mL    	        Imaging:  echo:< from: Transthoracic Echocardiogram (12.03.20 @ 13:00) >Ef 39%1. Normal trileaflet aortic valve.  2. Mild concentric left ventricular hypertrophy.3. Moderate to severe segmental left ventricular systolicdysfunction. The mid to distal septum, apex, distal  inferior walls are hypokinetic.4. Normal right ventricular size and function.        Stress test:  Cath:< from: Cardiac Cath Lab - Adult (12.03.20 @ 01:34) >CORONARY VESSELS: The coronary circulation is right dominant.  LM:   --  LM: Angiography showed mild atherosclerosis with no flow limitinglesions.  LAD:   --  Proximal LAD: There was a diffuse 100 % stenosis at a site withno prior intervention. The lesion was complex, eccentric, and associated  with a large filling defect consistent with thrombus. There was AGATHA grade0 flowthrough the vessel (no flow) and a large vascular territory distalto the lesion. This is a likely culprit for the patient's clinical  presentation. An intervention was performed.  --  Mid LAD: There was a diffuse 90 % stenosis at a site with no priorintervention. The lesion was complex and eccentric. There was AGATHA grade 2flow through the vessel (partial perfusion). This is a likely culprit forthe patient's clinical presentation. An intervention was performed.  --  D1: Angiography showed mild atherosclerosis with no flow limitinglesions.  CX:   --  Circumflex: Angiography showed mild atherosclerosis with no flowlimiting lesions.  RI:   --  Ramus intermedius: Angiography showed mild atherosclerosis withno flow limiting lesions.  RCA:   --  Mid RCA: There was a tubular 50 % stenosis at a site with noprior intervention. There was AGATHA grade 3 flow through the vessel (briskflow).  --  RPDA: Angiography showed mild atherosclerosis with no flow limitinglesions.  --  RPLS: Angiography showed mild atherosclerosis with no flow limitinglesions.        CT scan:    Labs:                          13.9   12.27 )-----------( 237      ( 04 Dec 2020 05:00 )             44.1       12-04    135  |  101  |  6<L>  ----------------------------<  123<H>  3.6   |  22  |  0.57    Ca    9.1      04 Dec 2020 05:00  Phos  2.8     12-03  Mg     2.0     12-04    TPro  6.9  /  Alb  4.1  /  TBili  0.8  /  DBili  < 0.2  /  AST  104<H>  /  ALT  29  /  AlkPhos  59  12-03      CARDIAC MARKERS ( 04 Dec 2020 05:00 )  x     / x     / 443 u/L / 25.64 ng/mL / x      CARDIAC MARKERS ( 03 Dec 2020 13:11 )  x     / x     / 986 u/L / 97.94 ng/mL / x      CARDIAC MARKERS ( 03 Dec 2020 06:20 )  x     / x     / 895 u/L / 91.30 ng/mL / x      CARDIAC MARKERS ( 03 Dec 2020 00:45 )  x     / x     / 59 u/L / 1.72 ng/mL / x          proBNP  Serum Pro-Brain Natriuretic Peptide: 631.5 pg/mL (12-03 @ 06:20)      Chol,Trig,LDL,HDL,A1C  Cholesterol, Serum: 180 mg/dL (12-03 @ 06:20)  Triglycerides, Serum: 146 mg/dL (12-03 @ 06:20)  HDL Cholesterol, Serum: 56 mg/dL (12-03 @ 06:20)      LIVER FUNCTIONS - ( 03 Dec 2020 18:15 )  Alb: 4.1 g/dL / Pro: 6.9 g/dL / ALK PHOS: 59 u/L / ALT: 29 u/L / AST: 104 u/L / GGT: x             PT/INR - ( 03 Dec 2020 06:20 )   PT: 12.9 SEC;   INR: 1.13          PTT - ( 03 Dec 2020 06:20 )  PTT:32.4 SEC  LABORATORY VALUES	 	                          13.9   12.27 )-----------( 237      ( 04 Dec 2020 05:00 )             44.1       12-04    135  |  101  |  6<L>  ----------------------------<  123<H>  3.6   |  22  |  0.57  12-03    136  |  101  |  9   ----------------------------<  124<H>  3.6   |  21<L>  |  0.48<L>    Ca    9.1      04 Dec 2020 05:00  Ca    8.9      03 Dec 2020 06:20  Phos  2.8     12-03  Mg     2.0     12-04  Mg     2.0     12-03    TPro  6.9  /  Alb  4.1  /  TBili  0.8  /  DBili  < 0.2  /  AST  104<H>  /  ALT  29  /  AlkPhos  59  12-03  TPro  6.6  /  Alb  4.0  /  TBili  0.6  /  DBili  x   /  AST  92<H>  /  ALT  25  /  AlkPhos  61  12-03    LIVER FUNCTIONS - ( 03 Dec 2020 18:15 )  Alb: 4.1 g/dL / Pro: 6.9 g/dL / ALK PHOS: 59 u/L / ALT: 29 u/L / AST: 104 u/L / GGT: x               CARDIAC MARKERS:  Creatine Kinase, Serum: 443 u/L (12-04 @ 05:00)  Creatine Kinase, Serum: 986 u/L (12-03 @ 13:11)  Creatine Kinase, Serum: 895 u/L (12-03 @ 06:20)    CKMB: 25.64 ng/mL (12-04 @ 05:00)  CKMB: 97.94 ng/mL (12-03 @ 13:11)  CKMB: 91.30 ng/mL (12-03 @ 06:20)    CKMB Relative Index: 5.8 (12-04 @ 05:00)  CKMB Relative Index: 9.9 (12-03 @ 13:11)  CKMB Relative Index: 10.2 (12-03 @ 06:20)    Troponin T, High Sensitivity: 1078 ng/L (12-04 @ 05:00)  Troponin T, High Sensitivity: 1568 ng/L (12-03 @ 13:11)  Troponin T, High Sensitivity: 1564 ng/L (12-03 @ 06:20)    Serum Pro-Brain Natriuretic Peptide: 631.5 pg/mL (12-03 @ 06:20)      12-03 @ 06:20  Cholesterol, Serum - 180  Direct LDL- 113  HDL Cholesterol, Serum- 56  Triglycerides, Serum- 146      Thyroid Stimulating Hormone, Serum: 1.57 uIU/mL (12-03 @ 06:20)        CAPILLARY BLOOD GLUCOSE                CBC Full  -  ( 04 Dec 2020 05:00 )  WBC Count : 12.27 K/uL  Hemoglobin : 13.9 g/dL  Hematocrit : 44.1 %  Platelet Count - Automated : 237 K/uL  Mean Cell Volume : 88.7 fL  Mean Cell Hemoglobin : 28.0 pg  Mean Cell Hemoglobin Concentration : 31.5 %  Auto Neutrophil # : x  Auto Lymphocyte # : x  Auto Monocyte # : x  Auto Eosinophil # : x  Auto Basophil # : x  Auto Neutrophil % : x  Auto Lymphocyte % : x  Auto Monocyte % : x  Auto Eosinophil % : x  Auto Basophil % : x      12-04    135  |  101  |  6<L>  ----------------------------<  123<H>  3.6   |  22  |  0.57  12-03    136  |  101  |  9   ----------------------------<  124<H>  3.6   |  21<L>  |  0.48<L>    Ca    9.1      04 Dec 2020 05:00  Ca    8.9      03 Dec 2020 06:20  Phos  2.8     12-03  Mg     2.0     12-04  Mg     2.0     12-03    TPro  6.9  /  Alb  4.1  /  TBili  0.8  /  DBili  < 0.2  /  AST  104<H>  /  ALT  29  /  AlkPhos  59  12-03  TPro  6.6  /  Alb  4.0  /  TBili  0.6  /  DBili  x   /  AST  92<H>  /  ALT  25  /  AlkPhos  61  12-03    LIVER FUNCTIONS - ( 03 Dec 2020 18:15 )  Alb: 4.1 g/dL / Pro: 6.9 g/dL / ALK PHOS: 59 u/L / ALT: 29 u/L / AST: 104 u/L / GGT: x             Serum Pro-Brain Natriuretic Peptide: 631.5 pg/mL (12-03 @ 06:20)          CAPILLARY BLOOD GLUCOSE          Assesment /Plan:    44 yo female PMH HTN, active smoker, drinl 3-5 vodka/week presents with  intermittent  substernal  chest pain radiating bilaterally to arms and back x 5 days.  CTA done and negative for dissection. Ekg shows KATHRYN in anterior leads. s/p LHC with THIAGO to pLAD and mLAD via R radial. EDP33, EF 40%    Neuro;  AxOx3    Pulm;  b/l lung clear      CVS  #STEMI (ST elevation myocardial infarction).    - Patient with ST elevations anterior leads  -Loaded with  mg now, Brilinta 180 mg and heparin 5000 units IVP x 1 in ER  - s/p emergent cardiac cath with THIAGO x 2 pLAD &mLAD.  -Continue ASA 81 mg qd, Brilinta 90 mg qd  -Start Atorvastatin 80 mg QD,   -started on coreg 6.25 mgbid  - CK/ trop uptrending, will continue to check until peak  - lipid profile, TSH, A1C normal   - chest pressure with Hypertension -190, started on nitro drip,titrated off    # Hypertension.  -  in ED  - overnight -180  - change metoprolol to coreg 12.5mg   -started on losartan 50mg   - DASH diet  -Monitor blood pressure.         #GI;  -protonix 40mg Po daily    Renal:  - voiding without problem  - S creat low 0.48, will continue to monitor    ID:   + leukocytosis ,WBC downtrendiong  - no fever  -likely in the setting MI  - will  continue to monitor         VTE (venous thromboembolism).   - IMPROVE-DD Assessment completed: <1%  -Heparin sq.                                    Chief compliant: chest pain x 5days    Interval history/Overnight events: received ativan 0.5mg PO and melatonin for anxiety last night. Slept very well at night . Today feeling much better,in good spirit. No chest pain /pressure,anxiety  or nausea .Tolerated breakfast. Off nitro drip at 055an today.    Tele events: NSR    Vital Signs Last 24 Hrs  T(C): 36.8 (04 Dec 2020 04:00), Max: 36.8 (04 Dec 2020 04:00)  T(F): 98.2 (04 Dec 2020 04:00), Max: 98.2 (04 Dec 2020 04:00)  HR: 92 (04 Dec 2020 07:00) (79 - 102)  BP: 112/78 (04 Dec 2020 07:00) (112/78 - 194/122)  BP(mean): 86 (04 Dec 2020 07:00) (86 - 144)  RR: 19 (04 Dec 2020 07:00) (13 - 24)  SpO2: 97% (04 Dec 2020 07:00) (92% - 99%)    Allergies: no Allergy           MEDICATIONS  (STANDING):  aspirin enteric coated 81 milliGRAM(s) Oral daily  atorvastatin 80 milliGRAM(s) Oral at bedtime  carvedilol 12.5 milliGRAM(s) Oral every 12 hours  chlorhexidine 4% Liquid 1 Application(s) Topical <User Schedule>  heparin   Injectable 5000 Unit(s) SubCutaneous every 12 hours  losartan 50 milliGRAM(s) Oral daily  melatonin 9 milliGRAM(s) Oral at bedtime  nicotine -  14 mG/24Hr(s) Patch 1 patch Transdermal daily  nitroglycerin  Infusion 10 MICROgram(s)/Min (3 mL/Hr) IV Continuous <Continuous>  pantoprazole    Tablet 40 milliGRAM(s) Oral before breakfast  potassium chloride    Tablet ER 40 milliEquivalent(s) Oral once  ticagrelor 90 milliGRAM(s) Oral every 12 hours    MEDICATIONS  (PRN):  LORazepam     Tablet 0.5 milliGRAM(s) Oral daily PRN Anxiety      CONSTITUTIONAL: No fevers, No chills, No fatigue, No weight gain  EYES: No vision changes   ENT: No congestion, No ear pain, No sore throat.  NECK: No pain, No stiffness  RESPIRATORY: No shortness of breath, No cough, No wheezing, No hemoptysis  CARDIOVASCULAR: No chest pain. No palpitations, No BARRIGA, No orthopnea, No paroxysmal nocturnal dyspnea, No pleuritic pain  GASTROINTESTINAL: No abdominal pain, No nausea, No vomiting, No hematemesis, No diarrhea No constipation. No melena  GENITOURINARY: No dysuria, No frequency, No incontinence, No hematuria  NEUROLOGICAL: No dizziness, No lightheadedness, No syncope, No LOC, No headache, No numbness or weakness  MUSCULOSKELETAL: No Edema, No joint pain, No joint swelling.  PSYCHIATRIC: No anxiety, No depression  DERMATOLOGY: No diaphoresis. No itching, No rashes, No pressure ulcers  HEME/LYMPH: No easy bruising, or bleeding gums        PHYSICAL EXAMINATION  Appearance: NAD, no distress  HEENT: Moist Mucous Membranes, Anicteric, PERRL, EOMI  Cardiovascular: Regular rate and rhythm, Normal S1 S2, No JVD, No murmurs  Respiratory: Lungs clear to auscultation. No rales, No rhonchi, No wheezing. No tenderness to palpation  Gastrointestinal:  Soft, Non-tender, + BS  Neurologic: Non-focal, A&Ox3  Skin: Warm and dry, No rashes, No ecchymosis, No cyanosis  Musculoskeletal: No clubbing, No cyanosis, No edema  Vascular: Peripheral pulses palpable 2+ bilaterally  Psychiatry: Mood & affect appropriate      	    		      I&O's Summary    03 Dec 2020 07:01  -  04 Dec 2020 07:00  --------------------------------------------------------  IN: 90 mL / OUT: 1100 mL / NET: -1010 mL    	        Imaging:  echo:< from: Transthoracic Echocardiogram (12.03.20 @ 13:00) >Ef 39%1. Normal trileaflet aortic valve.  2. Mild concentric left ventricular hypertrophy.3. Moderate to severe segmental left ventricular systolicdysfunction. The mid to distal septum, apex, distal  inferior walls are hypokinetic.4. Normal right ventricular size and function.        Stress test:  Cath:< from: Cardiac Cath Lab - Adult (12.03.20 @ 01:34) >CORONARY VESSELS: The coronary circulation is right dominant.  LM:   --  LM: Angiography showed mild atherosclerosis with no flow limitinglesions.  LAD:   --  Proximal LAD: There was a diffuse 100 % stenosis at a site withno prior intervention. The lesion was complex, eccentric, and associated  with a large filling defect consistent with thrombus. There was AGATHA grade0 flowthrough the vessel (no flow) and a large vascular territory distalto the lesion. This is a likely culprit for the patient's clinical  presentation. An intervention was performed.  --  Mid LAD: There was a diffuse 90 % stenosis at a site with no priorintervention. The lesion was complex and eccentric. There was AGATHA grade 2flow through the vessel (partial perfusion). This is a likely culprit forthe patient's clinical presentation. An intervention was performed.  --  D1: Angiography showed mild atherosclerosis with no flow limitinglesions.  CX:   --  Circumflex: Angiography showed mild atherosclerosis with no flowlimiting lesions.  RI:   --  Ramus intermedius: Angiography showed mild atherosclerosis withno flow limiting lesions.  RCA:   --  Mid RCA: There was a tubular 50 % stenosis at a site with noprior intervention. There was AGATHA grade 3 flow through the vessel (briskflow).  --  RPDA: Angiography showed mild atherosclerosis with no flow limitinglesions.  --  RPLS: Angiography showed mild atherosclerosis with no flow limitinglesions.        CT scan:    Labs:                          13.9   12.27 )-----------( 237      ( 04 Dec 2020 05:00 )             44.1       12-04    135  |  101  |  6<L>  ----------------------------<  123<H>  3.6   |  22  |  0.57    Ca    9.1      04 Dec 2020 05:00  Phos  2.8     12-03  Mg     2.0     12-04    TPro  6.9  /  Alb  4.1  /  TBili  0.8  /  DBili  < 0.2  /  AST  104<H>  /  ALT  29  /  AlkPhos  59  12-03      CARDIAC MARKERS ( 04 Dec 2020 05:00 )  x     / x     / 443 u/L / 25.64 ng/mL / x      CARDIAC MARKERS ( 03 Dec 2020 13:11 )  x     / x     / 986 u/L / 97.94 ng/mL / x      CARDIAC MARKERS ( 03 Dec 2020 06:20 )  x     / x     / 895 u/L / 91.30 ng/mL / x      CARDIAC MARKERS ( 03 Dec 2020 00:45 )  x     / x     / 59 u/L / 1.72 ng/mL / x          proBNP  Serum Pro-Brain Natriuretic Peptide: 631.5 pg/mL (12-03 @ 06:20)      Chol,Trig,LDL,HDL,A1C  Cholesterol, Serum: 180 mg/dL (12-03 @ 06:20)  Triglycerides, Serum: 146 mg/dL (12-03 @ 06:20)  HDL Cholesterol, Serum: 56 mg/dL (12-03 @ 06:20)      LIVER FUNCTIONS - ( 03 Dec 2020 18:15 )  Alb: 4.1 g/dL / Pro: 6.9 g/dL / ALK PHOS: 59 u/L / ALT: 29 u/L / AST: 104 u/L / GGT: x             PT/INR - ( 03 Dec 2020 06:20 )   PT: 12.9 SEC;   INR: 1.13          PTT - ( 03 Dec 2020 06:20 )  PTT:32.4 SEC  LABORATORY VALUES	 	                          13.9   12.27 )-----------( 237      ( 04 Dec 2020 05:00 )             44.1       12-04    135  |  101  |  6<L>  ----------------------------<  123<H>  3.6   |  22  |  0.57  12-03    136  |  101  |  9   ----------------------------<  124<H>  3.6   |  21<L>  |  0.48<L>    Ca    9.1      04 Dec 2020 05:00  Ca    8.9      03 Dec 2020 06:20  Phos  2.8     12-03  Mg     2.0     12-04  Mg     2.0     12-03    TPro  6.9  /  Alb  4.1  /  TBili  0.8  /  DBili  < 0.2  /  AST  104<H>  /  ALT  29  /  AlkPhos  59  12-03  TPro  6.6  /  Alb  4.0  /  TBili  0.6  /  DBili  x   /  AST  92<H>  /  ALT  25  /  AlkPhos  61  12-03    LIVER FUNCTIONS - ( 03 Dec 2020 18:15 )  Alb: 4.1 g/dL / Pro: 6.9 g/dL / ALK PHOS: 59 u/L / ALT: 29 u/L / AST: 104 u/L / GGT: x               CARDIAC MARKERS:  Creatine Kinase, Serum: 443 u/L (12-04 @ 05:00)  Creatine Kinase, Serum: 986 u/L (12-03 @ 13:11)  Creatine Kinase, Serum: 895 u/L (12-03 @ 06:20)    CKMB: 25.64 ng/mL (12-04 @ 05:00)  CKMB: 97.94 ng/mL (12-03 @ 13:11)  CKMB: 91.30 ng/mL (12-03 @ 06:20)    CKMB Relative Index: 5.8 (12-04 @ 05:00)  CKMB Relative Index: 9.9 (12-03 @ 13:11)  CKMB Relative Index: 10.2 (12-03 @ 06:20)    Troponin T, High Sensitivity: 1078 ng/L (12-04 @ 05:00)  Troponin T, High Sensitivity: 1568 ng/L (12-03 @ 13:11)  Troponin T, High Sensitivity: 1564 ng/L (12-03 @ 06:20)    Serum Pro-Brain Natriuretic Peptide: 631.5 pg/mL (12-03 @ 06:20)      12-03 @ 06:20  Cholesterol, Serum - 180  Direct LDL- 113  HDL Cholesterol, Serum- 56  Triglycerides, Serum- 146      Thyroid Stimulating Hormone, Serum: 1.57 uIU/mL (12-03 @ 06:20)        CAPILLARY BLOOD GLUCOSE                CBC Full  -  ( 04 Dec 2020 05:00 )  WBC Count : 12.27 K/uL  Hemoglobin : 13.9 g/dL  Hematocrit : 44.1 %  Platelet Count - Automated : 237 K/uL  Mean Cell Volume : 88.7 fL  Mean Cell Hemoglobin : 28.0 pg  Mean Cell Hemoglobin Concentration : 31.5 %  Auto Neutrophil # : x  Auto Lymphocyte # : x  Auto Monocyte # : x  Auto Eosinophil # : x  Auto Basophil # : x  Auto Neutrophil % : x  Auto Lymphocyte % : x  Auto Monocyte % : x  Auto Eosinophil % : x  Auto Basophil % : x      12-04    135  |  101  |  6<L>  ----------------------------<  123<H>  3.6   |  22  |  0.57  12-03    136  |  101  |  9   ----------------------------<  124<H>  3.6   |  21<L>  |  0.48<L>    Ca    9.1      04 Dec 2020 05:00  Ca    8.9      03 Dec 2020 06:20  Phos  2.8     12-03  Mg     2.0     12-04  Mg     2.0     12-03    TPro  6.9  /  Alb  4.1  /  TBili  0.8  /  DBili  < 0.2  /  AST  104<H>  /  ALT  29  /  AlkPhos  59  12-03  TPro  6.6  /  Alb  4.0  /  TBili  0.6  /  DBili  x   /  AST  92<H>  /  ALT  25  /  AlkPhos  61  12-03    LIVER FUNCTIONS - ( 03 Dec 2020 18:15 )  Alb: 4.1 g/dL / Pro: 6.9 g/dL / ALK PHOS: 59 u/L / ALT: 29 u/L / AST: 104 u/L / GGT: x             Serum Pro-Brain Natriuretic Peptide: 631.5 pg/mL (12-03 @ 06:20)          CAPILLARY BLOOD GLUCOSE        Assesment /Plan:   44 yo female PMH HTN, active smoker, drinl 3-5 vodka/week presents with  intermittent  substernal  chest pain radiating bilaterally to arms and back x 5 days.  CTA done and negative for dissection. Ekg shows KATHRYN in anterior leads. s/p LHC with THIAGO to pLAD and mLAD via R radial. EDP33, EF 40%    Neuro;  -AxOx3  -anxious on home dose ativan  -monitor for alchol withdrawal    Pulm;  - no active issie      CVS  #STEMI (ST elevation myocardial infarction).    - Patient with ST elevations anterior leads  -Loaded with  mg now, Brilinta 180 mg and heparin 5000 units IVP x 1 in ER  - s/p emergent cardiac cath with THIAGO x 2 pLAD &mLAD.  -Continue ASA 81 mg qd, Brilinta 90 mg qd  -Start Atorvastatin 80 mg QD,   -started on coreg 12.5 mg bid  - Trop peaked at 1568  - lipid profile, TSH, A1C normal   - chest pressure with Hypertension -190, started on nitro drip,titrate off today  - TTE showed EF 30% , Mild concentric left ventricular hypertrophy.3. Moderate to severe segmental left ventricular systolic dysfunction. The mid to distal septum, apex, distal inferior walls are hypokinetic.      # Hypertension.  -  in ED  - overnight -180  - change metoprolol to coreg 12.5mg   cough with? ace-I, started on losartan 25mg   - DASH diet  -Monitor blood pressure.         #GI;  -protonix 40mg Po daily    Renal:  - voiding without problem  - S creat low 0.48, will continue to monitor    ID:   + leukocytosis downtrending  - no fever  -likely in the setting MI  - will  continue to monitor    Active smoker  -Provided SBIRT services. Positive reinforcement provided given patient currently within healthy guidelines. Education materials reviewed and [facility] Tobacco [facility] information  given to patient  - on nicotine patch         VTE (venous thromboembolism).   - IMPROVE-DD Assessment completed: <1%  -Heparin sq.                                                    Chief compliant: chest pain x 5days    Interval history/Overnight events: received ativan 0.5mg PO and melatonin for anxiety last night. Slept very well at night . Today feeling much better,in good spirit. No chest pain /pressure,anxiety  or nausea .Tolerated breakfast. Off nitro drip at 055an today.    Tele events: NSR    Vital Signs Last 24 Hrs  T(C): 36.8 (04 Dec 2020 04:00), Max: 36.8 (04 Dec 2020 04:00)  T(F): 98.2 (04 Dec 2020 04:00), Max: 98.2 (04 Dec 2020 04:00)  HR: 92 (04 Dec 2020 07:00) (79 - 102)  BP: 112/78 (04 Dec 2020 07:00) (112/78 - 194/122)  BP(mean): 86 (04 Dec 2020 07:00) (86 - 144)  RR: 19 (04 Dec 2020 07:00) (13 - 24)  SpO2: 97% (04 Dec 2020 07:00) (92% - 99%)    Allergies: no Allergy           MEDICATIONS  (STANDING):  aspirin enteric coated 81 milliGRAM(s) Oral daily  atorvastatin 80 milliGRAM(s) Oral at bedtime  carvedilol 12.5 milliGRAM(s) Oral every 12 hours  chlorhexidine 4% Liquid 1 Application(s) Topical <User Schedule>  heparin   Injectable 5000 Unit(s) SubCutaneous every 12 hours  losartan 50 milliGRAM(s) Oral daily  melatonin 9 milliGRAM(s) Oral at bedtime  nicotine -  14 mG/24Hr(s) Patch 1 patch Transdermal daily  nitroglycerin  Infusion 10 MICROgram(s)/Min (3 mL/Hr) IV Continuous <Continuous>  pantoprazole    Tablet 40 milliGRAM(s) Oral before breakfast  potassium chloride    Tablet ER 40 milliEquivalent(s) Oral once  ticagrelor 90 milliGRAM(s) Oral every 12 hours    MEDICATIONS  (PRN):  LORazepam     Tablet 0.5 milliGRAM(s) Oral daily PRN Anxiety      CONSTITUTIONAL: No fevers, No chills, No fatigue, No weight gain  EYES: No vision changes   ENT: No congestion, No ear pain, No sore throat.  NECK: No pain, No stiffness  RESPIRATORY: No shortness of breath, No cough, No wheezing, No hemoptysis  CARDIOVASCULAR: No chest pain. No palpitations, No BARRIGA, No orthopnea, No paroxysmal nocturnal dyspnea, No pleuritic pain  GASTROINTESTINAL: No abdominal pain, No nausea, No vomiting, No hematemesis, No diarrhea No constipation. No melena  GENITOURINARY: No dysuria, No frequency, No incontinence, No hematuria  NEUROLOGICAL: No dizziness, No lightheadedness, No syncope, No LOC, No headache, No numbness or weakness  MUSCULOSKELETAL: No Edema, No joint pain, No joint swelling.  PSYCHIATRIC: No anxiety, No depression  DERMATOLOGY: No diaphoresis. No itching, No rashes, No pressure ulcers  HEME/LYMPH: No easy bruising, or bleeding gums        PHYSICAL EXAMINATION  Appearance: NAD, no distress  HEENT: Moist Mucous Membranes, Anicteric, PERRL, EOMI  Cardiovascular: Regular rate and rhythm, Normal S1 S2, No JVD, No murmurs  Respiratory: Lungs clear to auscultation. No rales, No rhonchi, No wheezing. No tenderness to palpation  Gastrointestinal:  Soft, Non-tender, + BS  Neurologic: Non-focal, A&Ox3  Skin: Warm and dry, No rashes, No ecchymosis, No cyanosis  Musculoskeletal: No clubbing, No cyanosis, No edema  Vascular: Peripheral pulses palpable 2+ bilaterally  Psychiatry: Mood & affect appropriate      	    		      I&O's Summary    03 Dec 2020 07:01  -  04 Dec 2020 07:00  --------------------------------------------------------  IN: 90 mL / OUT: 1100 mL / NET: -1010 mL    	        Imaging:  echo:< from: Transthoracic Echocardiogram (12.03.20 @ 13:00) >Ef 36%1. Normal trileaflet aortic valve.  2. Mild concentric left ventricular hypertrophy.3. Moderate to severe segmental left ventricular systolicdysfunction. The mid to distal septum, apex, distal inferior walls are hypokinetic.4. Normal right ventricular size and function.        Stress test:  Cath:< from: Cardiac Cath Lab - Adult (12.03.20 @ 01:34) >CORONARY VESSELS: The coronary circulation is right dominant.  LM:   --  LM: Angiography showed mild atherosclerosis with no flow limitinglesions.  LAD:   --  Proximal LAD: There was a diffuse 100 % stenosis at a site withno prior intervention. The lesion was complex, eccentric, and associated  with a large filling defect consistent with thrombus. There was AGATHA grade0 flowthrough the vessel (no flow) and a large vascular territory distalto the lesion. This is a likely culprit for the patient's clinical  presentation. An intervention was performed.  --  Mid LAD: There was a diffuse 90 % stenosis at a site with no priorintervention. The lesion was complex and eccentric. There was AGATHA grade 2flow through the vessel (partial perfusion). This is a likely culprit forthe patient's clinical presentation. An intervention was performed.  --  D1: Angiography showed mild atherosclerosis with no flow limitinglesions.  CX:   --  Circumflex: Angiography showed mild atherosclerosis with no flowlimiting lesions.  RI:   --  Ramus intermedius: Angiography showed mild atherosclerosis withno flow limiting lesions.  RCA:   --  Mid RCA: There was a tubular 50 % stenosis at a site with noprior intervention. There was AGATHA grade 3 flow through the vessel (briskflow).  --  RPDA: Angiography showed mild atherosclerosis with no flow limitinglesions.  --  RPLS: Angiography showed mild atherosclerosis with no flow limitinglesions.        CT scan:    Labs:                          13.9   12.27 )-----------( 237      ( 04 Dec 2020 05:00 )             44.1       12-04    135  |  101  |  6<L>  ----------------------------<  123<H>  3.6   |  22  |  0.57    Ca    9.1      04 Dec 2020 05:00  Phos  2.8     12-03  Mg     2.0     12-04    TPro  6.9  /  Alb  4.1  /  TBili  0.8  /  DBili  < 0.2  /  AST  104<H>  /  ALT  29  /  AlkPhos  59  12-03      CARDIAC MARKERS ( 04 Dec 2020 05:00 )  x     / x     / 443 u/L / 25.64 ng/mL / x      CARDIAC MARKERS ( 03 Dec 2020 13:11 )  x     / x     / 986 u/L / 97.94 ng/mL / x      CARDIAC MARKERS ( 03 Dec 2020 06:20 )  x     / x     / 895 u/L / 91.30 ng/mL / x      CARDIAC MARKERS ( 03 Dec 2020 00:45 )  x     / x     / 59 u/L / 1.72 ng/mL / x          proBNP  Serum Pro-Brain Natriuretic Peptide: 631.5 pg/mL (12-03 @ 06:20)      Chol,Trig,LDL,HDL,A1C  Cholesterol, Serum: 180 mg/dL (12-03 @ 06:20)  Triglycerides, Serum: 146 mg/dL (12-03 @ 06:20)  HDL Cholesterol, Serum: 56 mg/dL (12-03 @ 06:20)      LIVER FUNCTIONS - ( 03 Dec 2020 18:15 )  Alb: 4.1 g/dL / Pro: 6.9 g/dL / ALK PHOS: 59 u/L / ALT: 29 u/L / AST: 104 u/L / GGT: x             PT/INR - ( 03 Dec 2020 06:20 )   PT: 12.9 SEC;   INR: 1.13          PTT - ( 03 Dec 2020 06:20 )  PTT:32.4 SEC  LABORATORY VALUES	 	                          13.9   12.27 )-----------( 237      ( 04 Dec 2020 05:00 )             44.1       12-04    135  |  101  |  6<L>  ----------------------------<  123<H>  3.6   |  22  |  0.57  12-03    136  |  101  |  9   ----------------------------<  124<H>  3.6   |  21<L>  |  0.48<L>    Ca    9.1      04 Dec 2020 05:00  Ca    8.9      03 Dec 2020 06:20  Phos  2.8     12-03  Mg     2.0     12-04  Mg     2.0     12-03    TPro  6.9  /  Alb  4.1  /  TBili  0.8  /  DBili  < 0.2  /  AST  104<H>  /  ALT  29  /  AlkPhos  59  12-03  TPro  6.6  /  Alb  4.0  /  TBili  0.6  /  DBili  x   /  AST  92<H>  /  ALT  25  /  AlkPhos  61  12-03    LIVER FUNCTIONS - ( 03 Dec 2020 18:15 )  Alb: 4.1 g/dL / Pro: 6.9 g/dL / ALK PHOS: 59 u/L / ALT: 29 u/L / AST: 104 u/L / GGT: x               CARDIAC MARKERS:  Creatine Kinase, Serum: 443 u/L (12-04 @ 05:00)  Creatine Kinase, Serum: 986 u/L (12-03 @ 13:11)  Creatine Kinase, Serum: 895 u/L (12-03 @ 06:20)    CKMB: 25.64 ng/mL (12-04 @ 05:00)  CKMB: 97.94 ng/mL (12-03 @ 13:11)  CKMB: 91.30 ng/mL (12-03 @ 06:20)    CKMB Relative Index: 5.8 (12-04 @ 05:00)  CKMB Relative Index: 9.9 (12-03 @ 13:11)  CKMB Relative Index: 10.2 (12-03 @ 06:20)    Troponin T, High Sensitivity: 1078 ng/L (12-04 @ 05:00)  Troponin T, High Sensitivity: 1568 ng/L (12-03 @ 13:11)  Troponin T, High Sensitivity: 1564 ng/L (12-03 @ 06:20)    Serum Pro-Brain Natriuretic Peptide: 631.5 pg/mL (12-03 @ 06:20)      12-03 @ 06:20  Cholesterol, Serum - 180  Direct LDL- 113  HDL Cholesterol, Serum- 56  Triglycerides, Serum- 146      Thyroid Stimulating Hormone, Serum: 1.57 uIU/mL (12-03 @ 06:20)        CAPILLARY BLOOD GLUCOSE                CBC Full  -  ( 04 Dec 2020 05:00 )  WBC Count : 12.27 K/uL  Hemoglobin : 13.9 g/dL  Hematocrit : 44.1 %  Platelet Count - Automated : 237 K/uL  Mean Cell Volume : 88.7 fL  Mean Cell Hemoglobin : 28.0 pg  Mean Cell Hemoglobin Concentration : 31.5 %  Auto Neutrophil # : x  Auto Lymphocyte # : x  Auto Monocyte # : x  Auto Eosinophil # : x  Auto Basophil # : x  Auto Neutrophil % : x  Auto Lymphocyte % : x  Auto Monocyte % : x  Auto Eosinophil % : x  Auto Basophil % : x      12-04    135  |  101  |  6<L>  ----------------------------<  123<H>  3.6   |  22  |  0.57  12-03    136  |  101  |  9   ----------------------------<  124<H>  3.6   |  21<L>  |  0.48<L>    Ca    9.1      04 Dec 2020 05:00  Ca    8.9      03 Dec 2020 06:20  Phos  2.8     12-03  Mg     2.0     12-04  Mg     2.0     12-03    TPro  6.9  /  Alb  4.1  /  TBili  0.8  /  DBili  < 0.2  /  AST  104<H>  /  ALT  29  /  AlkPhos  59  12-03  TPro  6.6  /  Alb  4.0  /  TBili  0.6  /  DBili  x   /  AST  92<H>  /  ALT  25  /  AlkPhos  61  12-03    LIVER FUNCTIONS - ( 03 Dec 2020 18:15 )  Alb: 4.1 g/dL / Pro: 6.9 g/dL / ALK PHOS: 59 u/L / ALT: 29 u/L / AST: 104 u/L / GGT: x             Serum Pro-Brain Natriuretic Peptide: 631.5 pg/mL (12-03 @ 06:20)          CAPILLARY BLOOD GLUCOSE        Assesment /Plan:   46 yo female PMH HTN, active smoker, drinl 3-5 vodka/week presents with  intermittent  substernal  chest pain radiating bilaterally to arms and back x 5 days.  CTA done and negative for dissection. Ekg shows KATHRYN in anterior leads. s/p LHC with THIAGO to pLAD and mLAD via R radial. EDP33, EF 40%    Neuro;  -AxOx3  -anxious on home dose ativan  -monitor for alchol withdrawal    Pulm;  - no active issie      CVS  #STEMI (ST elevation myocardial infarction).    - Patient with ST elevations anterior leads  -Loaded with  mg now, Brilinta 180 mg and heparin 5000 units IVP x 1 in ER  - s/p emergent cardiac cath with THIAGO x 2 pLAD &mLAD.  -Continue ASA 81 mg qd, Brilinta 90 mg qd, Atorvastatin 80 mg QD,  coreg 12.5 mg bid  - Trop peaked at 1568  - lipid profile, TSH, A1C normal   - chest pressure with Hypertension -190, started on nitro drip,titrate off today  - TTE showed EF 36% , Mild concentric left ventricular hypertrophy.3. Moderate to severe segmental left ventricular systolic dysfunction. The mid to distal septum, apex, distal inferior walls are hypokinetic.      # Hypertension.  -  in ED  - overnight -180  - change metoprolol to coreg 12.5mg   cough with ace-I, started on losartan 50mg   - DASH diet  -Monitor blood pressure.         #GI;  -protonix 40mg Po daily    Renal:  - voiding without problem  - S creat low 0.48, will continue to monitor    ID:   + leukocytosis downtrending  - no fever  -likely in the setting MI  - will  continue to monitor    Active smoker  -Provided SBIRT services. Positive reinforcement provided given patient currently within healthy guidelines. Education materials reviewed and [facility] information  given to patient  - on nicotine patch         VTE (venous thromboembolism).   - IMPROVE-DD Assessment completed: <1%  -Heparin sq.

## 2020-12-04 NOTE — CHART NOTE - NSCHARTNOTEFT_GEN_A_CORE
46 yo female PMH HTN, active smoker presents with chest pain. Reports pain started 4 days ago. Describes pain as substernal radiating bilaterally to arms and back. The pain returned 2 days later and again last night. In ED CXR with widened mediastinum, CTA done and negative for dissection. ekg shows KATHRYN in anterior leads. Cath lab activated. Pt loaded with aspirin, Brilinta and heparin per ACS protocol. Pt now s/p LHC with THIAGO to pLAD and mLAD. EDP33, EF 40%. Transfer to CCU.Continue ASA 81 mg qd, Brilinta 90 mg qd, Atorvastatin 80 mg QD,  coreg 12.5 mg bid and losartan 50mg.She was briefly on nitro drip for Hypertension and chest pain which was titrated  off. Echo revealed EF 36% , Mild concentric left ventricular hypertrophy.. Moderate to severe segmental left ventricular systolic dysfunction. The mid to distal septum, apex, distal inferior walls are hypokinetic. She closely  monitor for alcohol withdrawal given h/o drinking vodka 3-5 drinks/day.She had and episode of anxiety with CIWA score 5.Ativan 0.5mg PO as needed ordered and given with good result. Patient was started on nicotine patch also provided SBIRT services. Positive reinforcement provided given patient currently within healthy guidelines. Education materials reviewed and [facility] information  given to patient    - Echo on sunday to r/o LV thrombus

## 2020-12-04 NOTE — DISCHARGE NOTE PROVIDER - NSDCDCMDCOMP_GEN_ALL_CORE
Referral Letter  Dear Dr. Wang:     I had the pleasure of seeing your patient, LOUISA GARNER, in the [facility] clinic today.     Following is a summary of documentation from today's clinic visit. Thank you for allowing me to participate in LOUISA DUONG's care, and please feel free to contact me if you have any questions or concerns.     Respectfully yours,         CARMELA Daniels.  Pediatric Pulmonology  [facility]  Office: [phone]  Page: [phone]        Reason For Visit  LOUISA GARNER is an established patient here today for a follow-up management of IVIS.   Patient accompanied by mother .   Referred By:   Dr. 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 His first PSG was in August 2017, which was borderline. He was maintained without any intervention. He has a repeat PSG in November 2017, which showed severe IVIS. He was then distracted. A repeat PSG in January, showed AHI of 0.8 and REM AHI of 5.6. He, initially, had symptoms of IVIS with positioning prior to intervention. After intervention, his PSG had improved, but has REM related sleep apnea. He had prolonged exhalation (29) and central apneas (8). A repeat PSG in May 2018, which showed AHI of 0.1 and REM RDI of 2.0. A bronch was done to evaluate airway for second surgery, which was negative.     He goes to bed around 9 pm and falls asleep within 30 minutes. He wakes up at 7 am, spontaneously. He will wake up around 1 to 3 am for a bottle a few nights a week. He naps 2 times a day for an hour and a half. He sleeps in mom's room in a crib. He does not snore. He moves while sleeping and can be restless. He had a few days where he was shaking his leg. There is no TV or computer on, but there is a night light and a noise machine.    Since his last visit, he may be  starting with an URI now. In terms of his baseline, there is no coughing or wheezing. He does not have any itchy, watery eyes, but has clear rhinorrhea. He had 2 Holter recordings, but mom is unaware of the results. He does not have any choking, gagging or vomiting. He has trouble with some solids and will start speech and feeding therapies. He will also have an evaluation for PT, OT, etc. He will have an intake next week. He drinks liquids and purees without issues. He will see an urologist in October because he has a retracted testes.      Past Medical History  History of eczema (Z87.2)  History of right inguinal hernia (Z87.19)  History of Retrognathia (M26.19)    Surgical History  History of Inguinal Hernia Repair  History of Treatment Of Jaw    Family History  Family history of bipolar disorder (Z81.8)  Family history of Schizoaffective disorder    Social History  Family members smoke outdoors only  Lives with parents  Secondhand smoke exposure (Z77.22)    He lives with his mother, grandparent(s) and 1 sisters. There are 5 household members.     Childcare arrangements include no childcare by others.     The patient is exposed to smoke from family members who smoke . mom smokes outdoors.   [place]   Heating System: forced air.   Pets in the home: two dogs.   He is exposed to the following environmental properties: attached garage, carpets, curtains and fireplace or wood stove.      Current Meds  Vitamin D 400 UNIT/ML Oral Liquid; TAKE 1 ML DAILY;  Therapy: 22Sep2017 to (Evaluate:16Nov2017) Recorded    Vitals  Vitals   Recorded: 06Sep2018 11:14AM   Height: 81 cm  Weight: 11.18 kg  BMI Calculated: 17.04  BSA Calculated: 0.48  0-24 Length Percentile: 92 %  0-24 Weight Percentile: 85 %  Temperature: 97.7 F  Heart Rate: 112  Respiration: 24  O2 Saturation: 96    Physical Exam    Peds Pulmo Physical Exam   General: In NAD  Skin: No eczema, rashes; hemangioma on left  shoulder  HEENT: NCAT, PERRL, TMI, normal Nasal mucosa, Oropharynx - MMM, Neck supple, mandibular scars healing well  Chest: nl chest wall anatomy and symmetry, good aeration in all lung fields, no retractions, wheezing, crackles  CV: RRR no murmurs, nl cap refill, nl pulses  Abd: soft, NTND, nl BS, no masses  Ext: No cyanosis, clubbing, edema  Neuro: Reflexes 2+/2+ bilaterally.      Results/Data  CBC WITH AUTOMATED DIFFERENTIAL 24Apr2018 04:20PM [facility] [place]     Test Name Result Flag Reference   WHITE BLOOD COUNT 14.7 K/mcL  5.0-19.5   RED CELL COUNT 4.88 mil/mcL H 3.10-4.50   HEMOGLOBIN 12.7 g/dl  10.5-13.5   HEMATOCRIT 38.3 %  29.0-41.0   MEAN CORPUSCULAR VOLUME 78.5 fL  70.0-86.0   MEAN CORPUSCULAR HEMOGLOBIN 26.0 pg  23.0-31.0   MEAN CORPUSCULAR HGB CONC 33.2 g/dl  30.0-36.0   RDW-CV 12.8 %  11.0-15.0   PLATELET COUNT 377 K/mcL  140-450   MATY% 21 %     LYM% 73 %     MON% 6 %     EOS% 0 %     BASO% 0 %     MATY ABS 3.1 K/mcL  1.0-8.5   LYM ABS 10.7 K/mcL  4.0-13.5   MON ABS 0.9 K/mcL  0.1-1.1   EOS ABS 0.0 K/mcL L 0.1-0.7   BASO ABS 0.1 K/mcL  0.0-0.2   WBC MORPHOLOGY NORMAL  NORMAL   PLATELET MORPHOLOGY NORMAL  NORMAL   DIFF TYPE      AUTO DIFF verified by manual smear review.   HYPOCHROMIA FEW     NRBC 0 %  0   PERCENT IMMATURE GRANULOCYTES 0 %     ABSOLUTE IMMATURE GRANULOCYTES 0.0 K/mcL  0-0.2     LEAD,BLOOD/LISA 24Apr2018 04:20PM [facility] [place]     Test Name Result Flag Reference   LEAD BLOOD/LISA <2.0 mcg/dl  0.0-4.9   CHILDREN TO AGE 16 (CDC GUIDELINES)  NORMAL:                 UP to 4.9 MCG/DL  MEDICAL EVALUATION:     >20      MCG/DL  MEDICAL EMERGENCY:      >=45     MCG/DL     XR VIDEOSWALLOW STUDY [id number] 02:43PM SRINIVAS CARDONA   Ordering Provider: SRINIVAS ALMAZAN.    Reason For Study: choking episode,choking episode.     Test Name Result Flag Reference   XR VIDEOSWALLOW STUDY (Report)     Accession #    [id number]    XR VIDEOSWALLOW STUDY    HISTORY: Choking.    COMPARISON: No comparison  available.    TECHNIQUE:  This exam was performed with low dose, pulsed fluoroscopy and other dose reduction techniques   when possible. Fluoroscopic time: 3.1 minutes.    The patient was administered barium impregnated materials of varying consistencies and the   swallowing mechanism was observed fluoroscopically from the lateral projection with members   of the speech therapy team in attendance. The patient was studied in the upright, lateral   projection concentrating on the pharynx and upper esophagus and trachea.    FINDINGS/IMPRESSION:  Thin liquid was administered. Aspiration was not seen.    Puree was administered. Aspiration was not seen.    Please see speech pathology notes for full details. Note that this examination did not include a   full evaluation of the entire esophagus.    **** F I N A L ****    Transcribed By: JEVON   18 2:52 pm    Dictated By:      VAHE IVEY MD    Electronically Reviewed and Approved By:      VAHE IVEY MD 18 2:52 pm     CD ECHO 2D COMPLETE W DOP AND COLOR-PEDS [id number] 01:05PM ANNELISE HUBER   Ordering Provider: ANNELISE HUBER.    Reason For Study: bradycardia,bradycardia.     Test Name Result Flag Reference   CD ECHO 2D COMPLETE W DOP AND COLOR-PEDS (Report)     Accession #    [id number]    *[facility]*  [address] [place] [address]  Phone [phone]    --------------------------------------------------------------------------  PEDIATRIC TRANSTHORACIC ECHOCARDIOGRAM (TTE)    Patient: Rudy Allison  Study Date/Time:    Nov 15 2017 1:14PM  MRN:   [id number]    FIN#:         [id number]  :   2017   Ht/Wt:         63cm 7.1kg  Age:   3.8months    BSA:          0.36m2  Gender: M        Baseline BP/HR:    97 / 77690ccy  Ordering Physician:   Annelise Huber  Attending Physician:   Marisa Kincaid  Diagnostic Physician:  Lilian Torres MD  Sonographer:       Tom  Atanas    --------------------------------------------------------------------------  INDICATIONS:  PFO.    --------------------------------------------------------------------------  STUDY CONCLUSIONS    Impressions: There is a small patent foramen ovale with a small left to  right shunt. This is a normal echocardiogram for the patient's age.  Summary:    1. Atrial septum: There is a small patent foramen ovale. The atrial level    shunt is left to right.  2. Left ventricle: The cavity size is normal. Wall thickness is normal.    Systolic function is normal.  3. Ventricular septum: There is no evidence of a ventricular septal    defect.  4. Systemic-pulmonary shunts No ductal shunt is identified by Doppler.    --------------------------------------------------------------------------  STUDY DATA: Pediatric transthoracic echocardiogram. M-mode, 2D, Doppler,  and color Doppler. Study status: Routine. Patient status: Inpatient.  Location: Portable. Procedure: Transthoracic echocardiography was    performed. Images were obtained using a Epifanio iE33 cardiac ultrasound  machine. Image quality was good. Study completion: There were no  complications. Significant patient movement during exam.    --------------------------------------------------------------------------  FINDINGS    ANATOMIC RELATIONSHIPS:  Normal visceral situs. Left sided cardiac apex  (levocardia).Concordant atrioventricular alignment.Concordant  ventriculoarterial connection.  SYSTEMIC VEINS:  Inferior vena cava: The vessel is normal in size; there is a normal  course.  Superior vena cava: The vessel is normal in size; there is a normal  course.  RIGHT ATRIUM: The atrium is normal in size.  TRICUSPID VALVE:  The valve is structurally normal.  Doppler:  Transvalvular velocity is within the normal range. There is physiologic  regurgitation.  RIGHT VENTRICLE: The cavity size is normal. Wall thickness is normal. The    Accession  #    [id number]    outflow tract shows a velocity flow profile with a normal, non-obstructive  pattern. Systolic function is normal.  PULMONIC VALVE:  The valve is structurally normal.  Doppler:  Transvalvular velocity is within the normal range. There is no evidence  for stenosis. There is physiologic regurgitation.  PULMONARY ARTERIES:  Systolic pressure is within the normal range.  Main pulmonary artery: The artery arises normally and is of normal size.  Left pulmonary artery: The artery arises normally and is of normal size.  Right pulmonary artery: The artery arises normally and is of normal size.  PULMONARY VEINS: Visualization of the pulmonary venous anatomy is  incomplete, but a significant abnormality is unlikely.  LEFT ATRIUM: The atrium is normal in size.  ATRIAL SEPTUM: There is a small patent foramen ovale. The atrial level  shunt is left to right.  MITRAL VALVE:  The valve is structurally normal.  Doppler:  Transvalvular velocity is within the normal range. There is no  regurgitation.  LEFT VENTRICLE: The cavity size is normal. Wall thickness is normal. The  outflow tract shows a velocity flow profile with a normal, non-obstructive  pattern. Systolic function is normal.  VENTRICULAR SEPTUM:  There is no evidence of a ventricular septal  defect.  AORTIC VALVE:  The valve is structurally normal. The valve is trileaflet.    Doppler: Transvalvular velocity is within the normal range. There is  no stenosis. There is no regurgitation.  AORTA: The aorta is normal and without evidence of coarctation.  CORONARIES:  Left main: Normal.  Right coronary: Normal.  PERICARDIUM: The pericardium is normal in appearance. There is no  pericardial effusion.  EXTRACARDIAC SHUNTING: No ductal shunt is identified by Doppler.  PLEURA: No evidence of pleural fluid accumulation.  BASELINE ECG: Normal.    --------------------------------------------------------------------------  Measurements     Left ventricle                Value    Reference  Z   LV ID, ED, MM                2.5  cm   2.1 - 2.9  -0.4     LV ID, ES, MM                1.4  cm   1.3 - 1.9  -1.1   LV fx shortening, MM            42  %   33 - 45   0.9   LV mid-wall fx shortening, MM        23  %   ----------- ----   LV PW thickness, ED, MM           0.4  cm   0.3 - 0.6  -1.6   LV PW thickness, ES, MM           0.7  cm   0.6 - 0.9  -0.7   LV PW thickening, MM            105  %   ----------- ----   IVS/LV PW ratio, ED, MM           0.9     0.69 - 1.44 -0.9   LV relative wall thickness, ED,       0.29     ----------- ----   MM   LV end-diastolic volume,          21  ml   ----------- ----   Teichholz MM   LV end-systolic volume, Teichholz      5   ml   ----------- ----   MM   LV ejection fraction, Teichholz       75  %   ----------- ----   MM   LV end-diastolic volume/bsa,        59  ml/m2 ----------- ----   Teichholz MM   LV end-systolic volume/bsa,         15  ml/m2 ----------- ----   Teichholz MM   LV wall mass, MM         (L)    14  g   15 - 32   -2.6    Accession #    [id number]     LV wall mass/bsa, MM            39  g/m2 ----------- ----     Ventricular septum             Value    Reference  Z   IVS thickness, ED, MM       (L)    0.3  cm   0.4 - 0.6  -2.5   IVS thickness, ES, MM            0.7  cm   0.6 - 0.9  -0.1   IVS thickening, MM             122  %   ----------- ----     LVOT                    Value    Reference  Z   LVOT area                  1   cm2  ----------- ----     Right ventricle               Value    Reference  Z   RV ID, ED, MM                0.6  cm   ----------- ----  Legend:  (L) and (H) roshan values outside specified reference range.    Interpreted and electronically signed by    Lilian Torres MD  2017 08:47    *** FINAL ***    Performing Technologists: Flora Cabrera    Transcribed By : JEVON   2017 8:47 am    Dictated By:      LILIAN STAPLETON MD    Approved By :      LILIAN STAPLETON MD 2017 8:47 am        Discussion/Summary    Impression:. 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        Plan    Will d/w cardiology re: Holter and bradycardia.  Continue speech and feeding therapy.   Nutrition: Encourage optimal caloric intake.   Immunizations: Ensure timely immunizations, including influenza vaccine.   Therapies: Therapies through [facility].   The patient was reminded that smoking is a deadly habit and should always be avoided.   The patient was reminded to get vaccinated annually against influenza.   The patient was instructed to return to the clinic for persistent or worsening symptoms.    The patient should be scheduled for a follow up visit in 4 months.      Signatures   Electronically signed by : LUCILA PETER MD; Sep  6 2018 11:31AM CST     This document is complete and the patient is ready for discharge.

## 2020-12-04 NOTE — DISCHARGE NOTE PROVIDER - HOSPITAL COURSE
46 yo female PMH HTN, active smoker presents with chest pain. Reports pain started 4 days ago. Describes pain as substernal radiating bilaterally to arms and back. The pain returned 2 days later and again last night. In ED CXR with widened mediastinum, CTA done and negative for dissection. ekg shows KATHRYN in anterior leads. Cath lab activated. Pt loaded with aspirin, Brilinta and heparin per ACS protocol. Pt now s/p LHC with THIAGO to pLAD and mLAD. EDP33, EF 40%. Transfer to CCU.Continue ASA 81 mg qd, Brilinta 90 mg qd, Atorvastatin 80 mg QD,  coreg 12.5 mg bid and losartan 50mg.She was briefly on nitro drip for Hypertension and chest pain which was titrated  off. Echo revealed EF 36% , Mild concentric left ventricular hypertrophy.. Moderate to severe segmental left ventricular systolic dysfunction. The mid to distal septum, apex, distal inferior walls are hypokinetic. She closely  monitor for alcohol withdrawal given h/o drinking vodka 3-5 drinks/day.She had and episode of anxiety with CIWA score 5.Ativan 0.5mg PO as needed ordered and given with good result. Patient was started on nicotine patch also provided SBIRT services. Positive reinforcement provided given patient currently within healthy guidelines. Education materials reviewed and [facility] information  given to patient   44 yo female PMH HTN, active smoker presents with chest pain. Reports pain started 4 days ago. Describes pain as substernal radiating bilaterally to arms and back. The pain returned 2 days later and again last night. In ED CXR with widened mediastinum, CTA done and negative for dissection. ekg shows KATHRYN in anterior leads. Cath lab activated. Pt loaded with aspirin, Brilinta and heparin per ACS protocol. Pt now s/p LHC with THIGAO to pLAD and mLAD. EDP33, EF 40%. Transfer to CCU.Continue ASA 81 mg qd, Brilinta 90 mg qd, Atorvastatin 80 mg QD,  coreg 12.5 mg bid and losartan 50mg.She was briefly on nitro drip for Hypertension and chest pain which was titrated  off. Echo revealed EF 36% , Mild concentric left ventricular hypertrophy.. Moderate to severe segmental left ventricular systolic dysfunction. The mid to distal septum, apex, distal inferior walls are hypokinetic. She closely  monitor for alcohol withdrawal given h/o drinking vodka 3-5 drinks/day.She had and episode of anxiety with CIWA score 5.Ativan 0.5mg PO as needed ordered and given with good result. Patient was started on nicotine patch also provided SBIRT services. Positive reinforcement provided given patient currently within healthy guidelines. Education materials reviewed and [facility] information  given to patient. Transferred to telemetry for ongoing monitoring. Repeat echo with moderate segmental left ventricular systolic dysfunction with hypokinesis of the septum, anteroseptum, distal inferior wall and apex. No LV thormbus is seen. Seen and examined. Afebrile, gris signs stable. Patient denies any chest pain, palpitations, shortness of breath, nausea, vomiting, or any other c/o's or new concerns. Tolerating diet, Voiding without difficulty and having regular bowel function. Patient medically stable for discharge. Discussed patient case with Cardiology Fellow who is in direct communication with Dr. Linares who agrees the patient is medically stable for discharge home today 12/6/20  with outpatient follow up with Dr Linares in 1 week. Patient's activity, and medications reviewed with patient. All questions and concerns addressed and answered to patient 's satisfaction.     46 yo female [facility] HTN, active smoker presents with chest pain. Reports pain started 4 days ago. Describes pain as substernal radiating bilaterally to arms and back. The pain returned 2 days later and again last night. In ED CXR with widened mediastinum, CTA done and negative for dissection. ekg shows KATHRYN in anterior leads. Cath lab activated. Pt loaded with aspirin, Brilinta and heparin per ACS protocol. Pt now s/p LHC with THIAGO to pLAD and mLAD. EDP33, EF 40%. Transfer to CCU.Continue ASA 81 mg qd, Brilinta 90 mg qd, Atorvastatin 80 mg QD,  coreg 12.5 mg bid and losartan 50mg.She was briefly on nitro drip for Hypertension and chest pain which was titrated  off. Echo revealed EF 36% , Mild concentric left ventricular hypertrophy.. Moderate to severe segmental left ventricular systolic dysfunction. The mid to distal septum, apex, distal inferior walls are hypokinetic. She closely  monitor for alcohol withdrawal given h/o drinking vodka 3-5 drinks/day.She had and episode of anxiety with CIWA score 5.Ativan 0.5mg PO as needed ordered and given with good result. Patient was started on nicotine patch also provided SBIRT services. Positive reinforcement provided given patient currently within healthy guidelines. Education materials reviewed and [facility] information  given to patient. Transferred to telemetry for ongoing monitoring. Repeat echo with moderate segmental left ventricular systolic dysfunction with hypokinesis of the septum, anteroseptum, distal inferior wall and apex. No LV thormbus is seen. Seen and examined. Afebrile, gris signs stable. Patient denies any chest pain, palpitations, shortness of breath, nausea, vomiting, or any other c/o's or new concerns. Tolerating diet, Voiding without difficulty and having regular bowel function. Patient medically stable. Discussed patient case with Cardiology Fellow, along with echo results and troponin levels, who is in direct communication with Dr. Linares, and  agrees the patient is medically stable for discharge home today 12/6/20  with outpatient follow up with Dr Linares in 1 week. Patient's activity, and medications reviewed with patient. All questions and concerns addressed and answered to patient 's satisfaction. Reinforced smoking cessation, resources available. Reviewed discharge medications with patient . All new medications requiring new prescriptions were sent to the pharmacy of patient's choice. Patient understands and agrees with plan of care.

## 2020-12-04 NOTE — DISCHARGE NOTE PROVIDER - CARE PROVIDER_API CALL
Loren Linares  CARDIOVASCULAR DISEASE  [facility], [address] [place] [address] [id number] [place] [address]  Phone: [phone]  Fax: [phone]  Established Patient  Follow Up Time: 1 week

## 2020-12-05 LAB
ANION GAP SERPL CALC-SCNC: 12 MMO/L — SIGNIFICANT CHANGE UP (ref 7–14)
BUN SERPL-MCNC: 10 MG/DL — SIGNIFICANT CHANGE UP (ref 7–23)
CALCIUM SERPL-MCNC: 9.1 MG/DL — SIGNIFICANT CHANGE UP (ref 8.4–10.5)
CHLORIDE SERPL-SCNC: 105 MMOL/L — SIGNIFICANT CHANGE UP (ref 98–107)
CK MB BLD-MCNC: 3.3 — HIGH (ref 0–2.5)
CK MB BLD-MCNC: 5.21 NG/ML — HIGH (ref 1–4.7)
CK SERPL-CCNC: 156 U/L — SIGNIFICANT CHANGE UP (ref 25–170)
CO2 SERPL-SCNC: 21 MMOL/L — LOW (ref 22–31)
CREAT SERPL-MCNC: 0.6 MG/DL — SIGNIFICANT CHANGE UP (ref 0.5–1.3)
GLUCOSE SERPL-MCNC: 109 MG/DL — HIGH (ref 70–99)
HCT VFR BLD CALC: 44.3 % — SIGNIFICANT CHANGE UP (ref 34.5–45)
HGB BLD-MCNC: 14 G/DL — SIGNIFICANT CHANGE UP (ref 11.5–15.5)
MAGNESIUM SERPL-MCNC: 2.1 MG/DL — SIGNIFICANT CHANGE UP (ref 1.6–2.6)
MCHC RBC-ENTMCNC: 28.1 PG — SIGNIFICANT CHANGE UP (ref 27–34)
MCHC RBC-ENTMCNC: 31.6 % — LOW (ref 32–36)
MCV RBC AUTO: 88.8 FL — SIGNIFICANT CHANGE UP (ref 80–100)
NRBC # FLD: 0 K/UL — SIGNIFICANT CHANGE UP (ref 0–0)
PLATELET # BLD AUTO: 220 K/UL — SIGNIFICANT CHANGE UP (ref 150–400)
PMV BLD: 11.2 FL — SIGNIFICANT CHANGE UP (ref 7–13)
POTASSIUM SERPL-MCNC: 4 MMOL/L — SIGNIFICANT CHANGE UP (ref 3.5–5.3)
POTASSIUM SERPL-SCNC: 4 MMOL/L — SIGNIFICANT CHANGE UP (ref 3.5–5.3)
RBC # BLD: 4.99 M/UL — SIGNIFICANT CHANGE UP (ref 3.8–5.2)
RBC # FLD: 14.1 % — SIGNIFICANT CHANGE UP (ref 10.3–14.5)
SODIUM SERPL-SCNC: 138 MMOL/L — SIGNIFICANT CHANGE UP (ref 135–145)
TROPONIN T, HIGH SENSITIVITY: 1030 NG/L — CRITICAL HIGH (ref ?–14)
WBC # BLD: 9.61 K/UL — SIGNIFICANT CHANGE UP (ref 3.8–10.5)
WBC # FLD AUTO: 9.61 K/UL — SIGNIFICANT CHANGE UP (ref 3.8–10.5)

## 2020-12-05 PROCEDURE — 99232 SBSQ HOSP IP/OBS MODERATE 35: CPT | Mod: GC

## 2020-12-05 RX ORDER — LOSARTAN POTASSIUM 100 MG/1
100 TABLET, FILM COATED ORAL DAILY
Refills: 0 | Status: DISCONTINUED | OUTPATIENT
Start: 2020-12-05 | End: 2020-12-05

## 2020-12-05 RX ORDER — POLYETHYLENE GLYCOL 3350 17 G/17G
17 POWDER, FOR SOLUTION ORAL DAILY
Refills: 0 | Status: DISCONTINUED | OUTPATIENT
Start: 2020-12-05 | End: 2020-12-06

## 2020-12-05 RX ORDER — LOSARTAN POTASSIUM 100 MG/1
100 TABLET, FILM COATED ORAL DAILY
Refills: 0 | Status: CANCELLED | OUTPATIENT
Start: 2020-12-13 | End: 2020-12-06

## 2020-12-05 RX ADMIN — Medication 0.5 MILLIGRAM(S): at 18:53

## 2020-12-05 RX ADMIN — TICAGRELOR 90 MILLIGRAM(S): 90 TABLET ORAL at 05:17

## 2020-12-05 RX ADMIN — Medication 1 PATCH: at 07:21

## 2020-12-05 RX ADMIN — LOSARTAN POTASSIUM 50 MILLIGRAM(S): 100 TABLET, FILM COATED ORAL at 05:17

## 2020-12-05 RX ADMIN — CARVEDILOL PHOSPHATE 12.5 MILLIGRAM(S): 80 CAPSULE, EXTENDED RELEASE ORAL at 17:12

## 2020-12-05 RX ADMIN — Medication 1 PATCH: at 12:07

## 2020-12-05 RX ADMIN — POLYETHYLENE GLYCOL 3350 17 GRAM(S): 17 POWDER, FOR SOLUTION ORAL at 10:24

## 2020-12-05 RX ADMIN — Medication 0.5 MILLIGRAM(S): at 12:09

## 2020-12-05 RX ADMIN — Medication 81 MILLIGRAM(S): at 12:09

## 2020-12-05 RX ADMIN — ATORVASTATIN CALCIUM 80 MILLIGRAM(S): 80 TABLET, FILM COATED ORAL at 21:07

## 2020-12-05 RX ADMIN — Medication 1 PATCH: at 19:40

## 2020-12-05 RX ADMIN — TICAGRELOR 90 MILLIGRAM(S): 90 TABLET ORAL at 17:12

## 2020-12-05 RX ADMIN — HEPARIN SODIUM 5000 UNIT(S): 5000 INJECTION INTRAVENOUS; SUBCUTANEOUS at 17:12

## 2020-12-05 RX ADMIN — CARVEDILOL PHOSPHATE 12.5 MILLIGRAM(S): 80 CAPSULE, EXTENDED RELEASE ORAL at 05:17

## 2020-12-05 RX ADMIN — PANTOPRAZOLE SODIUM 40 MILLIGRAM(S): 20 TABLET, DELAYED RELEASE ORAL at 05:17

## 2020-12-05 RX ADMIN — Medication 1 PATCH: at 12:10

## 2020-12-05 RX ADMIN — Medication 9 MILLIGRAM(S): at 21:07

## 2020-12-05 RX ADMIN — HEPARIN SODIUM 5000 UNIT(S): 5000 INJECTION INTRAVENOUS; SUBCUTANEOUS at 05:17

## 2020-12-05 NOTE — PROGRESS NOTE ADULT - SUBJECTIVE AND OBJECTIVE BOX
Robles Parker NP  CCU Service  Cardiology   Spect: [phone] ([facility])     PATIENT: LENCHO MCCULLOUGH, MRN: [id number]    CHIEF COMPLAINT: Patient is a 45y old  Female who presents with a chief complaint of Chest Pain (04 Dec 2020 11:46)      INTERVAL HISTORY/OVERNIGHT EVENTS: No overnight events. Denies chest pain or SOB, cough. Has been ambulating without assistance. Oriented to person, place, and time. Breathing comfortably on room air.    REVIEW OF SYSTEMS:    Constitutional:     [ ] negative [ ] fevers [ ] chills [ ] weight loss [ ] weight gain  HEENT:                  [ ] negative [ ] dry eyes [ ] eye irritation [ ] postnasal drip [ ] nasal congestion  CV:                         [ ] negative  [ ] chest pain [ ] orthopnea [ ] palpitations [ ] murmur  Resp:                     [ ] negative [ ] cough [ ] shortness of breath [ ] dyspnea [ ] wheezing [ ] sputum [ ] hemoptysis  GI:                          [ ] negative [ ] nausea [ ] vomiting [ ] diarrhea [ ] constipation [ ] abd pain [ ] dysphagia   :                        [ ] negative [ ] dysuria [ ] nocturia [ ] hematuria [ ] increased urinary frequency  Musculoskeletal: [ ] negative [ ] back pain [ ] myalgias [ ] arthralgias [ ] fracture  Skin:                       [ ] negative [ ] rash [ ] itch  Neurological:        [ ] negative [ ] headache [ ] dizziness [ ] syncope [ ] weakness [ ] numbness  Psychiatric:           [ ] negative [ ] anxiety [ ] depression  Endocrine:            [ ] negative [ ] diabetes [ ] thyroid problem  Heme/Lymph:      [ ] negative [ ] anemia [ ] bleeding problem  Allergic/Immune: [ ] negative [ ] itchy eyes [ ] nasal discharge [ ] hives [ ] angioedema    [x] All other systems negative  [ ] Unable to assess ROS because ________.    MEDICATIONS:  MEDICATIONS  (STANDING):  aspirin enteric coated 81 milliGRAM(s) Oral daily  atorvastatin 80 milliGRAM(s) Oral at bedtime  carvedilol 12.5 milliGRAM(s) Oral every 12 hours  heparin   Injectable 5000 Unit(s) SubCutaneous every 12 hours  losartan 100 milliGRAM(s) Oral daily  melatonin 9 milliGRAM(s) Oral at bedtime  nicotine -  14 mG/24Hr(s) Patch 1 patch Transdermal daily  pantoprazole    Tablet 40 milliGRAM(s) Oral before breakfast  polyethylene glycol 3350 17 Gram(s) Oral daily  ticagrelor 90 milliGRAM(s) Oral every 12 hours    MEDICATIONS  (PRN):  LORazepam     Tablet 0.5 milliGRAM(s) Oral daily PRN Anxiety      ALLERGIES: Allergies    Allergy Status Unknown    Intolerances        OBJECTIVE:  ICU Vital Signs Last 24 Hrs  T(C): 36.4 (05 Dec 2020 04:54), Max: 36.8 (04 Dec 2020 22:25)  T(F): 97.6 (05 Dec 2020 04:54), Max: 98.3 (04 Dec 2020 22:25)  HR: 90 (05 Dec 2020 04:54) (90 - 103)  BP: 142/92 (05 Dec 2020 05:20) (117/103 - 155/113)  BP(mean): 107 (04 Dec 2020 21:00) (90 - 123)  RR: 18 (05 Dec 2020 05:20) (12 - 27)  SpO2: 99% (05 Dec 2020 05:20) (94% - 99%)      CAPILLARY BLOOD GLUCOSE        I&O's Summary    04 Dec 2020 07:01  -  05 Dec 2020 07:00  --------------------------------------------------------  IN: 540 mL / OUT: 0 mL / NET: 540 mL    05 Dec 2020 07:01  -  05 Dec 2020 10:38  --------------------------------------------------------  IN: 118 mL / OUT: 200 mL / NET: -82 mL      Daily Height in cm: 170.18 (04 Dec 2020 22:25)    Daily     PHYSICAL EXAMINATION:  General: Comfortable, no acute distress, cooperative with exam.  Respiratory: CTAB, normal respiratory effort, no coughing, wheezes, crackles, or rales.  CV: RRR, S1S2, no murmurs, rubs or gallops. No JVD. Distal pulses intact.  Abdominal: Soft, nontender, nondistended, no rebound or guarding, normal bowel sounds.  Neurology: AOx3, no focal neuro defects, CALDERON x 4.  Extremities: No pitting edema, + Peripheral pulses.  Incisions:   Tubes:    LABS:                          14.0   9.61  )-----------( 220      ( 05 Dec 2020 06:35 )             44.3     12-05    138  |  105  |  10  ----------------------------<  109<H>  4.0   |  21<L>  |  0.60    Ca    9.1      05 Dec 2020 06:35  Mg     2.1     12-05    TPro  6.9  /  Alb  4.1  /  TBili  0.8  /  DBili  < 0.2  /  AST  104<H>  /  ALT  29  /  AlkPhos  59  12-03    LIVER FUNCTIONS - ( 03 Dec 2020 18:15 )  Alb: 4.1 g/dL / Pro: 6.9 g/dL / ALK PHOS: 59 u/L / ALT: 29 u/L / AST: 104 u/L / GGT: x             Creatine Kinase, Serum: 156 u/L (12-05 @ 06:35)  CKMB: 5.21 ng/mL (12-05 @ 06:35)  CKMB Relative Index: 3.3 (12-05 @ 06:35)    CARDIAC MARKERS:  Reviewed       TELEMETRY: NSR    EKG:     IMAGING:   Transthoracic Echocardiogram (12.03.20 @ 13:00)   CONCLUSIONS:  1. Normal trileaflet aortic valve.  2. Mild concentric left ventricular hypertrophy.  3. Moderate to severe segmental left ventricular systolic  dysfunction. The mid to distal septum, apex, distal  inferior walls are hypokinetic.  4. Normal right ventricular size and function.

## 2020-12-05 NOTE — PROGRESS NOTE ADULT - ASSESSMENT
45F HTN, tobacco use here with chest pain, found to have AWSTEMI. S/p THIAGO to prox and mid LAD.     Patient received ativan and melatonin last night, and now without any chest pressure.     CAD s/p THIAGO to pLAD and mLAD: on asa, Brilinta, lipitor 80. Coreg 12.5. increased losartan 100 mg daily.   Patient now off nitro gtt. Echo showed moderate to severe segmental LV systolic dysfunction with hypokinesis inferiorly. Trop now downtrending. Smoking cessation counseling. Will need 72 hour echo on Sunday.   Leukocytosis: improving, likely 2/2 STEMI, now WNL  Anxiety: continue home prn ativan   45F HTN, tobacco use here with chest pain, found to have AWSTEMI. S/p THIAGO to prox and mid LAD.     Patient received ativan and melatonin last night, and now without any chest pressure.     CAD s/p THIAGO to pLAD and mLAD: on asa, Brilinta, lipitor 80. Coreg 12.5. increased losartan 100 mg daily.   Patient now off nitro gtt. Echo showed moderate to severe segmental LV systolic dysfunction with hypokinesis inferiorly. Trop now downtrending. Smoking cessation counseling. Will need 72 hour echo on Sunday.   discontinued losartan and start lisinopril 20 mg daily.   Leukocytosis: improving, likely 2/2 STEMI, now WNL  Anxiety: continue home prn ativan   45F HTN, tobacco use here with chest pain, found to have AWSTEMI. S/p THIAGO to prox and mid LAD.     Patient received ativan and melatonin last night, and now without any chest pressure.     CAD s/p THIAGO to pLAD and mLAD: on asa, Brilinta, lipitor 80. Coreg 12.5. increased losartan 100 mg daily.   Patient now off nitro gtt. Echo showed moderate to severe segmental LV systolic dysfunction with hypokinesis inferiorly. Trop now downtrending. Smoking cessation counseling. Will need 72 hour echo on Sunday.   Continue losartan 100 mg daily, cough on ACE-i   Leukocytosis: improving, likely 2/2 STEMI, now WNL  Anxiety: continue home prn ativan

## 2020-12-06 ENCOUNTER — TRANSCRIPTION ENCOUNTER (OUTPATIENT)
Age: 45
End: 2020-12-06

## 2020-12-06 VITALS
TEMPERATURE: 98 F | OXYGEN SATURATION: 100 % | HEART RATE: 81 BPM | RESPIRATION RATE: 17 BRPM | DIASTOLIC BLOOD PRESSURE: 100 MMHG | SYSTOLIC BLOOD PRESSURE: 139 MMHG

## 2020-12-06 LAB
ANION GAP SERPL CALC-SCNC: 12 MMOL/L — SIGNIFICANT CHANGE UP (ref 7–14)
BUN SERPL-MCNC: 13 MG/DL — SIGNIFICANT CHANGE UP (ref 7–23)
CALCIUM SERPL-MCNC: 9.2 MG/DL — SIGNIFICANT CHANGE UP (ref 8.4–10.5)
CHLORIDE SERPL-SCNC: 103 MMOL/L — SIGNIFICANT CHANGE UP (ref 98–107)
CK MB BLD-MCNC: 3.2 % — HIGH (ref 0–2.5)
CK MB CFR SERPL CALC: 2.4 NG/ML — SIGNIFICANT CHANGE UP
CK SERPL-CCNC: 76 U/L — SIGNIFICANT CHANGE UP (ref 25–170)
CO2 SERPL-SCNC: 22 MMOL/L — SIGNIFICANT CHANGE UP (ref 22–31)
CREAT SERPL-MCNC: 0.64 MG/DL — SIGNIFICANT CHANGE UP (ref 0.5–1.3)
GLUCOSE SERPL-MCNC: 91 MG/DL — SIGNIFICANT CHANGE UP (ref 70–99)
HCT VFR BLD CALC: 42.7 % — SIGNIFICANT CHANGE UP (ref 34.5–45)
HGB BLD-MCNC: 14 G/DL — SIGNIFICANT CHANGE UP (ref 11.5–15.5)
MAGNESIUM SERPL-MCNC: 2.1 MG/DL — SIGNIFICANT CHANGE UP (ref 1.6–2.6)
MCHC RBC-ENTMCNC: 29.4 PG — SIGNIFICANT CHANGE UP (ref 27–34)
MCHC RBC-ENTMCNC: 32.8 GM/DL — SIGNIFICANT CHANGE UP (ref 32–36)
MCV RBC AUTO: 89.5 FL — SIGNIFICANT CHANGE UP (ref 80–100)
NRBC # BLD: 0 /100 WBCS — SIGNIFICANT CHANGE UP
NRBC # FLD: 0 K/UL — SIGNIFICANT CHANGE UP
PHOSPHATE SERPL-MCNC: 3.5 MG/DL — SIGNIFICANT CHANGE UP (ref 2.5–4.5)
PLATELET # BLD AUTO: 222 K/UL — SIGNIFICANT CHANGE UP (ref 150–400)
POTASSIUM SERPL-MCNC: 3.8 MMOL/L — SIGNIFICANT CHANGE UP (ref 3.5–5.3)
POTASSIUM SERPL-SCNC: 3.8 MMOL/L — SIGNIFICANT CHANGE UP (ref 3.5–5.3)
RBC # BLD: 4.77 M/UL — SIGNIFICANT CHANGE UP (ref 3.8–5.2)
RBC # FLD: 14 % — SIGNIFICANT CHANGE UP (ref 10.3–14.5)
SARS-COV-2 IGG SERPL QL IA: NEGATIVE — SIGNIFICANT CHANGE UP
SARS-COV-2 IGM SERPL IA-ACNC: <0.1 INDEX — SIGNIFICANT CHANGE UP
SODIUM SERPL-SCNC: 137 MMOL/L — SIGNIFICANT CHANGE UP (ref 135–145)
TROPONIN T, HIGH SENSITIVITY RESULT: 1313 NG/L — CRITICAL HIGH
WBC # BLD: 9.51 K/UL — SIGNIFICANT CHANGE UP (ref 3.8–10.5)
WBC # FLD AUTO: 9.51 K/UL — SIGNIFICANT CHANGE UP (ref 3.8–10.5)

## 2020-12-06 PROCEDURE — 93306 TTE W/DOPPLER COMPLETE: CPT | Mod: 26

## 2020-12-06 PROCEDURE — 99232 SBSQ HOSP IP/OBS MODERATE 35: CPT | Mod: GC

## 2020-12-06 RX ORDER — PANTOPRAZOLE SODIUM 20 MG/1
1 TABLET, DELAYED RELEASE ORAL
Qty: 30 | Refills: 0
Start: 2020-12-06 | End: 2021-01-04

## 2020-12-06 RX ORDER — LOSARTAN POTASSIUM 100 MG/1
1 TABLET, FILM COATED ORAL
Qty: 30 | Refills: 0
Start: 2020-12-06 | End: 2021-01-04

## 2020-12-06 RX ORDER — NICOTINE POLACRILEX 2 MG
1 GUM BUCCAL
Qty: 21 | Refills: 0
Start: 2020-12-06 | End: 2021-01-04

## 2020-12-06 RX ORDER — ASPIRIN/CALCIUM CARB/MAGNESIUM 324 MG
1 TABLET ORAL
Qty: 30 | Refills: 0
Start: 2020-12-06 | End: 2021-01-04

## 2020-12-06 RX ORDER — TICAGRELOR 90 MG/1
1 TABLET ORAL
Qty: 60 | Refills: 0
Start: 2020-12-06 | End: 2021-01-04

## 2020-12-06 RX ORDER — ATORVASTATIN CALCIUM 80 MG/1
1 TABLET, FILM COATED ORAL
Qty: 30 | Refills: 0
Start: 2020-12-06 | End: 2021-01-04

## 2020-12-06 RX ORDER — CARVEDILOL PHOSPHATE 80 MG/1
1 CAPSULE, EXTENDED RELEASE ORAL
Qty: 60 | Refills: 0
Start: 2020-12-06 | End: 2021-01-04

## 2020-12-06 RX ADMIN — Medication 1 PATCH: at 12:15

## 2020-12-06 RX ADMIN — Medication 81 MILLIGRAM(S): at 12:15

## 2020-12-06 RX ADMIN — Medication 1 PATCH: at 13:15

## 2020-12-06 RX ADMIN — TICAGRELOR 90 MILLIGRAM(S): 90 TABLET ORAL at 05:25

## 2020-12-06 RX ADMIN — PANTOPRAZOLE SODIUM 40 MILLIGRAM(S): 20 TABLET, DELAYED RELEASE ORAL at 05:26

## 2020-12-06 RX ADMIN — HEPARIN SODIUM 5000 UNIT(S): 5000 INJECTION INTRAVENOUS; SUBCUTANEOUS at 05:26

## 2020-12-06 RX ADMIN — CARVEDILOL PHOSPHATE 12.5 MILLIGRAM(S): 80 CAPSULE, EXTENDED RELEASE ORAL at 05:26

## 2020-12-06 RX ADMIN — Medication 0.5 MILLIGRAM(S): at 09:55

## 2020-12-06 NOTE — DISCHARGE NOTE NURSING/CASE MANAGEMENT/SOCIAL WORK - PATIENT PORTAL LINK FT
You can access the FollowMyHealth Patient Portal offered by [facility] by registering at the following website: [email or web address]. By joining [employer]’s FollowMyHealth portal, you will also be able to view your health information using other applications (apps) compatible with our system.

## 2020-12-06 NOTE — DISCHARGE NOTE NURSING/CASE MANAGEMENT/SOCIAL WORK - NSDCPEWEB_GEN_ALL_CORE
[facility] for Tobacco Control website --- [email or web address] --- [email or web address]

## 2020-12-06 NOTE — PROVIDER CONTACT NOTE (CRITICAL VALUE NOTIFICATION) - ACTION/TREATMENT ORDERED:
ACP Judith aware. Troponins trending down. Telemetry monitoring ongoing. Will continue to monitor. HOMAR Wong aware. Telemetry monitoring ongoing. Will continue to monitor.

## 2020-12-06 NOTE — DISCHARGE NOTE NURSING/CASE MANAGEMENT/SOCIAL WORK - NSDCPEEMAIL_GEN_ALL_CORE
[facility] for Tobacco Control email [email or web address]

## 2020-12-06 NOTE — PROVIDER CONTACT NOTE (CRITICAL VALUE NOTIFICATION) - ASSESSMENT
A&O x4. Breathing non-labored on room air. Pt had cardiac cath on 12/4.
Patient asymptomatic, denies chest pain. Vital signs within normal patient range.
Jaja Judge

## 2020-12-06 NOTE — PROGRESS NOTE ADULT - ASSESSMENT
45F HTN, tobacco use here with chest pain, found to have AWSTEMI. S/p THIAGO to prox and mid LAD.     AWSTEMI  CAD s/p THIAGO to pLAD and mLAD  Continue asa, Brilinta, lipitor 80mg, Coreg 12.5mg   Losartan 100 mg daily increased yesterday, diastolic b/p remains elevated   Continue losartan 100 mg daily, cough on ACE-i   Echo showed moderate to severe segmental LV systolic dysfunction with hypokinesis inferiorly. Trop now downtrending. Smoking cessation counseling.  TTE today to r/o LV thrombus. If no LV thrombus, okay to be discharged   Please send Rx for Brilinta for insurance coverages before discharge   Follow-up cardiology in 2 weeks    Call [phone] for any questions

## 2020-12-06 NOTE — PROGRESS NOTE ADULT - SUBJECTIVE AND OBJECTIVE BOX
Robles Parker NP  CCU Service  Cardiology   Spect: [phone] ([facility])     PATIENT: LENCHO MCCULLOUGH, MRN: [id number]    CHIEF COMPLAINT: Patient is a 45y old  Female who presents with a chief complaint of Chest Pain (05 Dec 2020 10:38)      INTERVAL HISTORY/OVERNIGHT EVENTS: No overnight events. Denies abdominal pain, chest pain or SOB, cough. Has been ambulating without assistance. Oriented to person, place, and time. Breathing comfortably on room air.    REVIEW OF SYSTEMS:    Constitutional:     [ ] negative [ ] fevers [ ] chills [ ] weight loss [ ] weight gain  HEENT:                  [ ] negative [ ] dry eyes [ ] eye irritation [ ] postnasal drip [ ] nasal congestion  CV:                         [ ] negative  [ ] chest pain [ ] orthopnea [ ] palpitations [ ] murmur  Resp:                     [ ] negative [ ] cough [ ] shortness of breath [ ] dyspnea [ ] wheezing [ ] sputum [ ] hemoptysis  GI:                          [ ] negative [ ] nausea [ ] vomiting [ ] diarrhea [ ] constipation [ ] abd pain [ ] dysphagia   :                        [ ] negative [ ] dysuria [ ] nocturia [ ] hematuria [ ] increased urinary frequency  Musculoskeletal: [ ] negative [ ] back pain [ ] myalgias [ ] arthralgias [ ] fracture  Skin:                       [ ] negative [ ] rash [ ] itch  Neurological:        [ ] negative [ ] headache [ ] dizziness [ ] syncope [ ] weakness [ ] numbness  Psychiatric:           [ ] negative [x] anxiety [ ] depression  Endocrine:            [ ] negative [ ] diabetes [ ] thyroid problem  Heme/Lymph:      [ ] negative [ ] anemia [ ] bleeding problem  Allergic/Immune: [ ] negative [ ] itchy eyes [ ] nasal discharge [ ] hives [ ] angioedema    [x] All other systems negative  [ ] Unable to assess ROS because ________.    MEDICATIONS:  MEDICATIONS  (STANDING):  aspirin enteric coated 81 milliGRAM(s) Oral daily  atorvastatin 80 milliGRAM(s) Oral at bedtime  carvedilol 12.5 milliGRAM(s) Oral every 12 hours  heparin   Injectable 5000 Unit(s) SubCutaneous every 12 hours  melatonin 9 milliGRAM(s) Oral at bedtime  nicotine -  14 mG/24Hr(s) Patch 1 patch Transdermal daily  pantoprazole    Tablet 40 milliGRAM(s) Oral before breakfast  ticagrelor 90 milliGRAM(s) Oral every 12 hours    MEDICATIONS  (PRN):  LORazepam     Tablet 0.5 milliGRAM(s) Oral daily PRN Anxiety      ALLERGIES: Allergies    Allergy Status Unknown    Intolerances        OBJECTIVE:  ICU Vital Signs Last 24 Hrs  T(C): 36.6 (06 Dec 2020 05:20), Max: 36.6 (06 Dec 2020 05:20)  T(F): 97.8 (06 Dec 2020 05:20), Max: 97.8 (06 Dec 2020 05:20)  HR: 87 (06 Dec 2020 05:20) (81 - 87)  BP: 138/96 (06 Dec 2020 05:20) (130/101 - 150/108)  RR: 18 (06 Dec 2020 05:20) (18 - 18)  SpO2: 99% (06 Dec 2020 05:20) (98% - 100%)      CAPILLARY BLOOD GLUCOSE        CAPILLARY BLOOD GLUCOSE        I&O's Summary    05 Dec 2020 07:01  -  06 Dec 2020 07:00  --------------------------------------------------------  IN: 1106 mL / OUT: 1225 mL / NET: -119 mL      Daily     Daily Weight in k (06 Dec 2020 05:20)    PHYSICAL EXAMINATION:  General: Comfortable, no acute distress, cooperative with exam.  Respiratory: CTAB, normal respiratory effort, no coughing, wheezes, crackles, or rales.  CV: RRR, S1S2, no murmurs, rubs or gallops. No JVD. Distal pulses intact.  Abdominal: Soft, nontender, nondistended, no rebound or guarding, normal bowel sounds.  Neurology: AOx3, no focal neuro defects, CALDERON x 4.  Extremities: No pitting edema, + Peripheral pulses.      LABS:                          14.0   9.61  )-----------( 220      ( 05 Dec 2020 06:35 )             44.3     12-05    138  |  105  |  10  ----------------------------<  109<H>  4.0   |  21<L>  |  0.60    Ca    9.1      05 Dec 2020 06:35  Mg     2.1     12-05            CARDIAC MARKERS   Reviewed     TELEMETRY: NSR

## 2020-12-06 NOTE — PROGRESS NOTE ADULT - ATTENDING COMMENTS
Nallely Arreola is a 45 year old woman with h/o HTN and tobacco use. She presented with chest pain, with acute AWSTEMI; s/p THIAGO to prox and mid LAD. Procedure was well tolerated. Standing regimen: EC aspirin 81 m oral daily, atorvastatin 80 mg oral at bedtime, carvedilol 12.5 mg oral every 12 hours, heparin 5000 Unit(s) SC every 12 hours, losartan 100 mg oral daily (with h/o ACE-I cough), melatonin 9 mg oral at bedtime,  nicotine -  14 mG/24Hr(s) Patch 1 patch Transdermal daily, pantoprazole 40 mg oral before breakfast,  polyethylene glycol 3350 17 Gram(s) oral daily and ticagrelor (Brilinta) 90 mg oral every 12 hours. ECG 12/5/2020 shows significant improvement (decreased ST-depression in precordial leads). However, there are persistent mild ST elevation in these leads. TTE 12/6/2020 showed moderate segmental left ventricular systolic dysfunction with hypokinesis of the septum, anteroseptum, distal inferior wall and apex. No LV thrombus was seen. There was normal left ventricular internal dimensions and wall thicknesses. The pericardium appeared normal, with no pericardial effusion.  Smoking cessation counseling has been started and the plan is discharge to home 12/6/2020. COVID-19 AB Neg 12/5/2020
45 year old woman with history of smoking (at least 1/2 ppd) who presents with chest pain since Saturday that worsened overnight and came to ED-->AWMI with PCI to LAD  Now chest pain improved.   #Neuro- no active issues, continue to monitor  will monitor for nicotine withdrawl and use patch as needed  #Pulm- COVID pending, continue to monitor, followup results  #CV- Patient with AWMI and PCI to LAD  -continue ASA and Brilinta  -continue statin  -eventual TTE  #Renal- no active issues  # DVT- sq heparin
Nallely Arreola is a 45 year old woman with h/o HTN and tobacco use. She presented with chest pain, with acute AWSTEMI; s/p THIAGO to prox and mid LAD. Procedure was well tolerated. Standing regimen: EC aspirin 81 m oral daily, atorvastatin 80 mg oral at bedtime, carvedilol 12.5 mg oral every 12 hours, heparin 5000 Unit(s) SC every 12 hours, losartan 100 mg oral daily (with h/o ACE-I cough), melatonin 9 mg oral at bedtime,  nicotine -  14 mG/24Hr(s) Patch 1 patch Transdermal daily, pantoprazole 40 mg oral before breakfast,  polyethylene glycol 3350 17 Gram(s) oral daily and ticagrelor (Brilinta) 90 mg oral every 12 hours. ECG 12/5/2020 shows significant improvement (decreased ST-depression in precordial leads). However, there are persistent mild ST elevation in these leads. Plan TTE to assess for anterior wall or apical aneurysm.  Smoking cessation counseling has been started
45 year old woman with history of smoking (at least 1/2 ppd) who presents with chest pain since Saturday that worsened overnight and came to ED-->AWMI with PCI to LAD  Now chest pain improved.   #Neuro- no active issues, continue to monitor  will monitor for nicotine withdrawl and use patch as needed  #Pulm- COVID pending, continue to monitor, followup results  #CV- Patient with AWMI and PCI to LAD  -continue ASA and Brilinta  -continue statin  -eventual TTE  #Renal- no active issues  # DVT- sq heparin

## 2020-12-07 PROBLEM — I10 ESSENTIAL (PRIMARY) HYPERTENSION: Chronic | Status: ACTIVE | Noted: 2020-12-03

## 2020-12-09 DIAGNOSIS — Z86.018 PERSONAL HISTORY OF OTHER BENIGN NEOPLASM: ICD-10-CM

## 2020-12-09 RX ORDER — LORAZEPAM 0.5 MG/1
0.5 TABLET ORAL DAILY
Refills: 0 | Status: ACTIVE | COMMUNITY

## 2020-12-09 RX ORDER — NICOTINE 21 MG/24HR
14 PATCH, TRANSDERMAL 24 HOURS TRANSDERMAL DAILY
Refills: 0 | Status: ACTIVE | COMMUNITY

## 2020-12-10 DIAGNOSIS — I82.90 ACUTE EMBOLISM AND THROMBOSIS OF UNSPECIFIED VEIN: ICD-10-CM

## 2020-12-15 ENCOUNTER — APPOINTMENT (OUTPATIENT)
Dept: CARDIOLOGY | Facility: CLINIC | Age: 45
End: 2020-12-15
Payer: COMMERCIAL

## 2020-12-15 ENCOUNTER — NON-APPOINTMENT (OUTPATIENT)
Age: 45
End: 2020-12-15

## 2020-12-15 VITALS
BODY MASS INDEX: 32.02 KG/M2 | TEMPERATURE: 97.3 F | WEIGHT: 204 LBS | DIASTOLIC BLOOD PRESSURE: 83 MMHG | HEIGHT: 67 IN | OXYGEN SATURATION: 100 % | SYSTOLIC BLOOD PRESSURE: 124 MMHG | HEART RATE: 67 BPM

## 2020-12-15 PROCEDURE — 93000 ELECTROCARDIOGRAM COMPLETE: CPT

## 2020-12-15 PROCEDURE — 99214 OFFICE O/P EST MOD 30 MIN: CPT

## 2020-12-15 PROCEDURE — 99072 ADDL SUPL MATRL&STAF TM PHE: CPT

## 2020-12-15 RX ORDER — ASPIRIN ENTERIC COATED TABLETS 81 MG 81 MG/1
81 TABLET, DELAYED RELEASE ORAL
Qty: 90 | Refills: 3 | Status: ACTIVE | COMMUNITY
Start: 1900-01-01 | End: 1900-01-01

## 2020-12-15 NOTE — REASON FOR VISIT
[Follow-Up - From Hospitalization] : follow-up of a recent hospitalization for [Discharge Date: ___] : Discharge Date: [unfilled] [FreeTextEntry2] : CAD-AWMI PCI to LAD, HFrEF, HTN HLD former smoker

## 2020-12-15 NOTE — PHYSICAL EXAM
[General Appearance - Well Developed] : well developed [Normal Appearance] : normal appearance [Well Groomed] : well groomed [General Appearance - Well Nourished] : well nourished [No Deformities] : no deformities [General Appearance - In No Acute Distress] : no acute distress [Normal Oral Mucosa] : normal oral mucosa [No Oral Pallor] : no oral pallor [No Oral Cyanosis] : no oral cyanosis [Normal Jugular Venous A Waves Present] : normal jugular venous A waves present [Normal Jugular Venous V Waves Present] : normal jugular venous V waves present [No Jugular Venous Rivera A Waves] : no jugular venous rivera A waves [Heart Rate And Rhythm] : heart rate and rhythm were normal [Heart Sounds] : normal S1 and S2 [Murmurs] : no murmurs present [Respiration, Rhythm And Depth] : normal respiratory rhythm and effort [Exaggerated Use Of Accessory Muscles For Inspiration] : no accessory muscle use [Auscultation Breath Sounds / Voice Sounds] : lungs were clear to auscultation bilaterally [Abdomen Soft] : soft [Abdomen Tenderness] : non-tender [Abdomen Mass (___ Cm)] : no abdominal mass palpated [Abnormal Walk] : normal gait [Gait - Sufficient For Exercise Testing] : the gait was sufficient for exercise testing [Nail Clubbing] : no clubbing of the fingernails [Cyanosis, Localized] : no localized cyanosis [Petechial Hemorrhages (___cm)] : no petechial hemorrhages [Skin Color & Pigmentation] : normal skin color and pigmentation [] : no rash [No Venous Stasis] : no venous stasis [Skin Lesions] : no skin lesions [No Skin Ulcers] : no skin ulcer [No Xanthoma] : no  xanthoma was observed

## 2020-12-15 NOTE — HISTORY OF PRESENT ILLNESS
[FreeTextEntry1] : 12/3/2020 [facility]\par LM: Angiography showed mild atherosclerosis with no flow limiting\par lesions.\par Proximal LAD: There was a diffuse 100 % stenosis at a site with\par no prior intervention. The lesion was complex, eccentric, and associated\par with a large filling defect consistent with thrombus. There was AGATHA grade\par 0 flow through the vessel (no flow) and a large vascular territory distal\par to the lesion. This is a likely culprit for the patient's clinical\par presentation. An intervention was performed.\par  Mid LAD: There was a diffuse 90 % stenosis at a site with no prior\par intervention. The lesion was complex and eccentric. There was AGATHA grade 2\par flow through the vessel (partial perfusion). This is a likely culprit for\par the patient's clinical presentation. An intervention was performed.\par D1: Angiography showed mild atherosclerosis with no flow limiting\par lesions.\par Circumflex: Angiography showed mild atherosclerosis with no flow\par limiting lesions.\par Ramus intermedius: Angiography showed mild atherosclerosis with\par no flow limiting lesions.\par Mid RCA: There was a tubular 50 % stenosis at a site with no\par prior intervention. There was AGATHA grade 3 flow through the vessel (brisk\par flow).\par RPDA: Angiography showed mild atherosclerosis with no flow limiting\par lesions.\par RPLS: Angiography showed mild atherosclerosis with no flow limiting\par lesions.\par COMPLICATIONS: No complications occurred during the cath lab visit.\par DIAGNOSTIC IMPRESSIONS: Successful PCI to severe stenosis/occlusion of mid\par and proximal LAD using ROE THIAGO (3.0 and 4.0mm stents)\par IVUS was used for stent optimization\par Moderate LV dysfunction\par DIAGNOSTIC RECOMMENDATIONS: DAPT x 12 months\par Aggressive risk factor modification\par \par 12/3/2020 TTE:1. Normal trileaflet aortic valve.\par 2. Mild concentric left ventricular hypertrophy.\par 3. Moderate to severe segmental left ventricular systolic\par dysfunction. The mid to distal septum, apex, distal\par inferior walls are hypokinetic.\par 4. Normal right ventricular size and function.\par \par 12/6/2020 TTE: 1. Endocardial visualization enhanced with intravenous\par injection of echo contrast (Definity). Moderate segmental\par left ventricular systolic dysfunction with hypokinesis of\par the septum, anteroseptum, distal inferior wall and apex. No\par LV thormbus is seen\par *** Compared with echocardiogram of 12/3/2020, no\par significant changes noted.\par \par 45 year old woman with recent admission to [facility] with AWMI ans SP PCI x 2 to LAD with resultant moderate to severe LV dysfunction. She is an active smoker. She is here today for followup\par #CAD- AWMI on 12/3/2020 with PCI x 2 to LAD on ASA and Brilinta. Condition is stable. Continue present meds\par EKG reviewed and no new changes\par Will refer to cardiac rehab\par #HFrEF- Moderate severe LV dysfunction. On Coreg 12.5 mg BID, Losartan 100 mg daily. BP stable. No edema. No dyspnea, slight fatigue. Continue present meds\par Check CMP next visit\par #HTN-Continue present meds\par #HLD- Continue Lipitor and decrease 40 mg daily; continue present

## 2020-12-15 NOTE — DISCUSSION/SUMMARY
[FreeTextEntry1] : 45 year old woman with CAD HFrEF HTN HLD here for followup\par \par #CAD- AWMI on 12/3/2020 with PCI x 2 to LAD on ASA and Brilinta. Condition is stable. Continue present meds\par EKG reviewed and no new changes\par Will refer to cardiac rehab\par #HFrEF- Moderate severe LV dysfunction. On Coreg 12.5 mg BID, Losartan 100 mg daily. BP stable. No edema. No dyspnea, slight fatigue. Continue present meds\par Check CMP next visit\par #HTN-Continue present meds\par #HLD- Continue Lipitor and decrease 40 mg daily; continue present

## 2021-01-04 ENCOUNTER — APPOINTMENT (OUTPATIENT)
Dept: CARDIOLOGY | Facility: CLINIC | Age: 46
End: 2021-01-04

## 2021-01-19 ENCOUNTER — NON-APPOINTMENT (OUTPATIENT)
Age: 46
End: 2021-01-19

## 2021-01-19 ENCOUNTER — APPOINTMENT (OUTPATIENT)
Dept: CARDIOLOGY | Facility: CLINIC | Age: 46
End: 2021-01-19
Payer: COMMERCIAL

## 2021-01-19 VITALS
WEIGHT: 209 LBS | OXYGEN SATURATION: 98 % | RESPIRATION RATE: 16 BRPM | TEMPERATURE: 97.3 F | BODY MASS INDEX: 32.8 KG/M2 | SYSTOLIC BLOOD PRESSURE: 133 MMHG | HEART RATE: 65 BPM | HEIGHT: 67 IN | DIASTOLIC BLOOD PRESSURE: 90 MMHG

## 2021-01-19 PROCEDURE — 93000 ELECTROCARDIOGRAM COMPLETE: CPT

## 2021-01-19 PROCEDURE — 99214 OFFICE O/P EST MOD 30 MIN: CPT

## 2021-01-19 PROCEDURE — 99072 ADDL SUPL MATRL&STAF TM PHE: CPT

## 2021-01-19 PROCEDURE — 36415 COLL VENOUS BLD VENIPUNCTURE: CPT

## 2021-01-19 NOTE — HISTORY OF PRESENT ILLNESS
[FreeTextEntry1] : 45 year old woman with recent admission to [facility] with AWMI ans SP PCI x 2 to LAD with resultant moderate to severe LV dysfunction. She is here today for followup\par #CAD- AWMI on 12/3/2020 with PCI x 2 to LAD on ASA and Brilinta. Condition is stable. Continue present meds\par EKG reviewed and no new changes\par #HFrEF- Moderate severe LV dysfunction. On Coreg 12.5 mg BID, Losartan 100 mg daily. BP stable. No edema. No dyspnea, slight fatigue. Continue present meds\par Check CMP \par #HTN-Continue present meds\par #HLD- Continue Lipitor 40 mg daily; continue present meds

## 2021-01-19 NOTE — PHYSICAL EXAM
[General Appearance - Well Developed] : well developed [Normal Appearance] : normal appearance [Well Groomed] : well groomed [General Appearance - Well Nourished] : well nourished [No Deformities] : no deformities [General Appearance - In No Acute Distress] : no acute distress [Normal Oral Mucosa] : normal oral mucosa [No Oral Pallor] : no oral pallor [No Oral Cyanosis] : no oral cyanosis [Normal Jugular Venous A Waves Present] : normal jugular venous A waves present [Normal Jugular Venous V Waves Present] : normal jugular venous V waves present [No Jugular Venous Rivera A Waves] : no jugular venous rivera A waves [Respiration, Rhythm And Depth] : normal respiratory rhythm and effort [Exaggerated Use Of Accessory Muscles For Inspiration] : no accessory muscle use [Auscultation Breath Sounds / Voice Sounds] : lungs were clear to auscultation bilaterally [Heart Rate And Rhythm] : heart rate and rhythm were normal [Heart Sounds] : normal S1 and S2 [Murmurs] : no murmurs present [Abdomen Soft] : soft [Abdomen Tenderness] : non-tender [Abdomen Mass (___ Cm)] : no abdominal mass palpated [Abnormal Walk] : normal gait [Gait - Sufficient For Exercise Testing] : the gait was sufficient for exercise testing [Nail Clubbing] : no clubbing of the fingernails [Cyanosis, Localized] : no localized cyanosis [Petechial Hemorrhages (___cm)] : no petechial hemorrhages [Skin Color & Pigmentation] : normal skin color and pigmentation [] : no rash [No Venous Stasis] : no venous stasis [Skin Lesions] : no skin lesions [No Skin Ulcers] : no skin ulcer [No Xanthoma] : no  xanthoma was observed

## 2021-01-19 NOTE — DISCUSSION/SUMMARY
[FreeTextEntry1] : 45 year old woman with CAD HFrEF HTN HLD here for followup\par \par #CAD- AWMI on 12/3/2020 with PCI x 2 to LAD on ASA and Brilinta. Condition is stable. Continue present meds\par EKG reviewed and no new changes\par #HFrEF- Moderate severe LV dysfunction. On Coreg 12.5 mg BID, Losartan 100 mg daily. BP stable. No edema. No dyspnea, slight fatigue. Continue present meds\par Check CMP \par #HTN-Continue present meds\par #HLD- Continue Lipitor 40 mg daily; continue present meds\par #FU in 2 months

## 2021-01-20 LAB
ALBUMIN SERPL ELPH-MCNC: 4.2 G/DL
ALP BLD-CCNC: 61 U/L
ALT SERPL-CCNC: 18 U/L
ANION GAP SERPL CALC-SCNC: 17 MMOL/L
AST SERPL-CCNC: 14 U/L
BILIRUB SERPL-MCNC: 0.4 MG/DL
BUN SERPL-MCNC: 14 MG/DL
CALCIUM SERPL-MCNC: 9.2 MG/DL
CHLORIDE SERPL-SCNC: 107 MMOL/L
CHOLEST SERPL-MCNC: 128 MG/DL
CO2 SERPL-SCNC: 20 MMOL/L
CREAT SERPL-MCNC: 0.69 MG/DL
ESTIMATED AVERAGE GLUCOSE: 105 MG/DL
GLUCOSE SERPL-MCNC: 75 MG/DL
HBA1C MFR BLD HPLC: 5.3 %
HDLC SERPL-MCNC: 55 MG/DL
LDLC SERPL CALC-MCNC: 56 MG/DL
NONHDLC SERPL-MCNC: 73 MG/DL
POTASSIUM SERPL-SCNC: 4.3 MMOL/L
PROT SERPL-MCNC: 6.9 G/DL
SODIUM SERPL-SCNC: 144 MMOL/L
TRIGL SERPL-MCNC: 83 MG/DL

## 2021-02-26 ENCOUNTER — INPATIENT (INPATIENT)
Facility: HOSPITAL | Age: 46
LOS: 0 days | Discharge: HOME | End: 2021-02-26
Attending: STUDENT IN AN ORGANIZED HEALTH CARE EDUCATION/TRAINING PROGRAM | Admitting: STUDENT IN AN ORGANIZED HEALTH CARE EDUCATION/TRAINING PROGRAM
Payer: COMMERCIAL

## 2021-02-26 ENCOUNTER — TRANSCRIPTION ENCOUNTER (OUTPATIENT)
Age: 46
End: 2021-02-26

## 2021-02-26 VITALS
DIASTOLIC BLOOD PRESSURE: 90 MMHG | RESPIRATION RATE: 16 BRPM | HEART RATE: 60 BPM | TEMPERATURE: 98 F | HEIGHT: 67 IN | SYSTOLIC BLOOD PRESSURE: 145 MMHG

## 2021-02-26 VITALS
HEART RATE: 70 BPM | OXYGEN SATURATION: 100 % | RESPIRATION RATE: 19 BRPM | TEMPERATURE: 98 F | SYSTOLIC BLOOD PRESSURE: 139 MMHG | DIASTOLIC BLOOD PRESSURE: 83 MMHG

## 2021-02-26 DIAGNOSIS — I10 ESSENTIAL (PRIMARY) HYPERTENSION: ICD-10-CM

## 2021-02-26 DIAGNOSIS — R07.9 CHEST PAIN, UNSPECIFIED: ICD-10-CM

## 2021-02-26 DIAGNOSIS — Z29.9 ENCOUNTER FOR PROPHYLACTIC MEASURES, UNSPECIFIED: ICD-10-CM

## 2021-02-26 DIAGNOSIS — D27.1 BENIGN NEOPLASM OF LEFT OVARY: Chronic | ICD-10-CM

## 2021-02-26 DIAGNOSIS — I20.0 UNSTABLE ANGINA: ICD-10-CM

## 2021-02-26 LAB
ALBUMIN SERPL ELPH-MCNC: 4.3 G/DL — SIGNIFICANT CHANGE UP (ref 3.3–5)
ALP SERPL-CCNC: 57 U/L — SIGNIFICANT CHANGE UP (ref 40–120)
ALT FLD-CCNC: 16 U/L — SIGNIFICANT CHANGE UP (ref 4–33)
ANION GAP SERPL CALC-SCNC: 10 MMOL/L — SIGNIFICANT CHANGE UP (ref 7–14)
APTT BLD: 29.7 SEC — SIGNIFICANT CHANGE UP (ref 27–36.3)
AST SERPL-CCNC: 10 U/L — SIGNIFICANT CHANGE UP (ref 4–32)
BASOPHILS # BLD AUTO: 0.2 K/UL — SIGNIFICANT CHANGE UP (ref 0–0.2)
BASOPHILS NFR BLD AUTO: 2.6 % — HIGH (ref 0–2)
BILIRUB SERPL-MCNC: 0.6 MG/DL — SIGNIFICANT CHANGE UP (ref 0.2–1.2)
BLD GP AB SCN SERPL QL: NEGATIVE — SIGNIFICANT CHANGE UP
BUN SERPL-MCNC: 14 MG/DL — SIGNIFICANT CHANGE UP (ref 7–23)
CALCIUM SERPL-MCNC: 9.1 MG/DL — SIGNIFICANT CHANGE UP (ref 8.4–10.5)
CHLORIDE SERPL-SCNC: 103 MMOL/L — SIGNIFICANT CHANGE UP (ref 98–107)
CO2 SERPL-SCNC: 27 MMOL/L — SIGNIFICANT CHANGE UP (ref 22–31)
CREAT SERPL-MCNC: 0.57 MG/DL — SIGNIFICANT CHANGE UP (ref 0.5–1.3)
EOSINOPHIL # BLD AUTO: 0.2 K/UL — SIGNIFICANT CHANGE UP (ref 0–0.5)
EOSINOPHIL NFR BLD AUTO: 2.6 % — SIGNIFICANT CHANGE UP (ref 0–6)
GLUCOSE SERPL-MCNC: 108 MG/DL — HIGH (ref 70–99)
HCG SERPL-ACNC: <5 MIU/ML — SIGNIFICANT CHANGE UP
HCT VFR BLD CALC: 42.1 % — SIGNIFICANT CHANGE UP (ref 34.5–45)
HGB BLD-MCNC: 13.4 G/DL — SIGNIFICANT CHANGE UP (ref 11.5–15.5)
IANC: 5.03 K/UL — SIGNIFICANT CHANGE UP (ref 1.5–8.5)
INR BLD: 1.07 RATIO — SIGNIFICANT CHANGE UP (ref 0.88–1.16)
LYMPHOCYTES # BLD AUTO: 1.97 K/UL — SIGNIFICANT CHANGE UP (ref 1–3.3)
LYMPHOCYTES # BLD AUTO: 25.6 % — SIGNIFICANT CHANGE UP (ref 13–44)
MCHC RBC-ENTMCNC: 28.2 PG — SIGNIFICANT CHANGE UP (ref 27–34)
MCHC RBC-ENTMCNC: 31.8 GM/DL — LOW (ref 32–36)
MCV RBC AUTO: 88.4 FL — SIGNIFICANT CHANGE UP (ref 80–100)
MONOCYTES # BLD AUTO: 0.33 K/UL — SIGNIFICANT CHANGE UP (ref 0–0.9)
MONOCYTES NFR BLD AUTO: 4.3 % — SIGNIFICANT CHANGE UP (ref 2–14)
NEUTROPHILS # BLD AUTO: 4.27 K/UL — SIGNIFICANT CHANGE UP (ref 1.8–7.4)
NEUTROPHILS NFR BLD AUTO: 55.5 % — SIGNIFICANT CHANGE UP (ref 43–77)
NT-PROBNP SERPL-SCNC: 149 PG/ML — SIGNIFICANT CHANGE UP
PLATELET # BLD AUTO: 261 K/UL — SIGNIFICANT CHANGE UP (ref 150–400)
POTASSIUM SERPL-MCNC: 3.7 MMOL/L — SIGNIFICANT CHANGE UP (ref 3.5–5.3)
POTASSIUM SERPL-SCNC: 3.7 MMOL/L — SIGNIFICANT CHANGE UP (ref 3.5–5.3)
PROT SERPL-MCNC: 6.9 G/DL — SIGNIFICANT CHANGE UP (ref 6–8.3)
PROTHROM AB SERPL-ACNC: 12.2 SEC — SIGNIFICANT CHANGE UP (ref 10.6–13.6)
RBC # BLD: 4.76 M/UL — SIGNIFICANT CHANGE UP (ref 3.8–5.2)
RBC # FLD: 13.6 % — SIGNIFICANT CHANGE UP (ref 10.3–14.5)
RH IG SCN BLD-IMP: POSITIVE — SIGNIFICANT CHANGE UP
SARS-COV-2 RNA SPEC QL NAA+PROBE: DETECTED
SODIUM SERPL-SCNC: 140 MMOL/L — SIGNIFICANT CHANGE UP (ref 135–145)
TROPONIN T, HIGH SENSITIVITY RESULT: 7 NG/L — SIGNIFICANT CHANGE UP
TROPONIN T, HIGH SENSITIVITY RESULT: 7 NG/L — SIGNIFICANT CHANGE UP
WBC # BLD: 7.69 K/UL — SIGNIFICANT CHANGE UP (ref 3.8–10.5)
WBC # FLD AUTO: 7.69 K/UL — SIGNIFICANT CHANGE UP (ref 3.8–10.5)

## 2021-02-26 PROCEDURE — 99152 MOD SED SAME PHYS/QHP 5/>YRS: CPT

## 2021-02-26 PROCEDURE — 99285 EMERGENCY DEPT VISIT HI MDM: CPT | Mod: 25

## 2021-02-26 PROCEDURE — 99222 1ST HOSP IP/OBS MODERATE 55: CPT | Mod: GC,25

## 2021-02-26 PROCEDURE — 93010 ELECTROCARDIOGRAM REPORT: CPT | Mod: 76

## 2021-02-26 PROCEDURE — 93458 L HRT ARTERY/VENTRICLE ANGIO: CPT | Mod: 26

## 2021-02-26 PROCEDURE — 99223 1ST HOSP IP/OBS HIGH 75: CPT | Mod: GC

## 2021-02-26 PROCEDURE — 93010 ELECTROCARDIOGRAM REPORT: CPT

## 2021-02-26 PROCEDURE — 71045 X-RAY EXAM CHEST 1 VIEW: CPT | Mod: 26

## 2021-02-26 RX ORDER — PANTOPRAZOLE SODIUM 20 MG/1
40 TABLET, DELAYED RELEASE ORAL
Refills: 0 | Status: DISCONTINUED | OUTPATIENT
Start: 2021-02-26 | End: 2021-02-26

## 2021-02-26 RX ORDER — CARVEDILOL PHOSPHATE 80 MG/1
12.5 CAPSULE, EXTENDED RELEASE ORAL EVERY 12 HOURS
Refills: 0 | Status: DISCONTINUED | OUTPATIENT
Start: 2021-02-26 | End: 2021-02-26

## 2021-02-26 RX ORDER — ASPIRIN/CALCIUM CARB/MAGNESIUM 324 MG
81 TABLET ORAL DAILY
Refills: 0 | Status: DISCONTINUED | OUTPATIENT
Start: 2021-02-26 | End: 2021-02-26

## 2021-02-26 RX ORDER — TICAGRELOR 90 MG/1
90 TABLET ORAL EVERY 12 HOURS
Refills: 0 | Status: DISCONTINUED | OUTPATIENT
Start: 2021-02-26 | End: 2021-02-26

## 2021-02-26 RX ORDER — ACETAMINOPHEN 500 MG
650 TABLET ORAL EVERY 4 HOURS
Refills: 0 | Status: DISCONTINUED | OUTPATIENT
Start: 2021-02-26 | End: 2021-02-26

## 2021-02-26 RX ORDER — LOSARTAN POTASSIUM 100 MG/1
100 TABLET, FILM COATED ORAL DAILY
Refills: 0 | Status: DISCONTINUED | OUTPATIENT
Start: 2021-02-26 | End: 2021-02-26

## 2021-02-26 RX ORDER — GUAIFENESIN/DEXTROMETHORPHAN 600MG-30MG
10 TABLET, EXTENDED RELEASE 12 HR ORAL EVERY 4 HOURS
Refills: 0 | Status: DISCONTINUED | OUTPATIENT
Start: 2021-02-26 | End: 2021-02-26

## 2021-02-26 RX ORDER — ALBUTEROL 90 UG/1
2 AEROSOL, METERED ORAL EVERY 4 HOURS
Refills: 0 | Status: DISCONTINUED | OUTPATIENT
Start: 2021-02-26 | End: 2021-02-26

## 2021-02-26 RX ORDER — NICOTINE POLACRILEX 2 MG
1 GUM BUCCAL DAILY
Refills: 0 | Status: DISCONTINUED | OUTPATIENT
Start: 2021-02-26 | End: 2021-02-26

## 2021-02-26 RX ORDER — LOSARTAN POTASSIUM 100 MG/1
1 TABLET, FILM COATED ORAL
Qty: 0 | Refills: 0 | DISCHARGE
Start: 2021-02-26

## 2021-02-26 RX ORDER — ASPIRIN/CALCIUM CARB/MAGNESIUM 324 MG
162 TABLET ORAL ONCE
Refills: 0 | Status: COMPLETED | OUTPATIENT
Start: 2021-02-26 | End: 2021-02-26

## 2021-02-26 RX ORDER — ATORVASTATIN CALCIUM 80 MG/1
80 TABLET, FILM COATED ORAL AT BEDTIME
Refills: 0 | Status: DISCONTINUED | OUTPATIENT
Start: 2021-02-26 | End: 2021-02-26

## 2021-02-26 RX ADMIN — CARVEDILOL PHOSPHATE 12.5 MILLIGRAM(S): 80 CAPSULE, EXTENDED RELEASE ORAL at 18:44

## 2021-02-26 RX ADMIN — Medication 0.5 MILLIGRAM(S): at 16:11

## 2021-02-26 RX ADMIN — TICAGRELOR 90 MILLIGRAM(S): 90 TABLET ORAL at 18:44

## 2021-02-26 RX ADMIN — Medication 162 MILLIGRAM(S): at 11:15

## 2021-02-26 NOTE — DISCHARGE NOTE PROVIDER - HOSPITAL COURSE
46yo F with PMH HTN, smoker, CAD s/p AWSTEMI s/p THIAGO to pLAD and mLAD in 12/2020, EF 40% on asa and brilinta who presents with chest pain since last night. Pain is pressure in mid sternal area, radiating to b/l shoulder, upper back, exertional, associated with SOB, fatigue, similar to last visit when she got cardiac stents. 50% mRCA occlusion on recent C 12/3/21 with no PCI. No fever, diaphoresis, N/V, syncope, lightheadedness, irregular rhythm, palpitations, leg swelling or focal neuro deficit. Last EtOH drink on Monday. No hx of withdrawals. Took asa 81 this AM. s/p cath_________________________. 46yo F with PMH HTN, smoker, CAD s/p AWSTEMI s/p THIAGO to pLAD and mLAD in 12/2020, EF 40% on asa and brilinta who presents with chest pain since last night. Pain is pressure in mid sternal area, radiating to b/l shoulder, upper back, exertional, associated with SOB, fatigue, similar to last visit when she got cardiac stents. 50% mRCA occlusion on recent C 12/3/21 with no PCI. No fever, diaphoresis, N/V, syncope, lightheadedness, irregular rhythm, palpitations, leg swelling or focal neuro deficit. Last EtOH drink on Monday. No hx of withdrawals. Took asa 81 this AM. s/p cath with. no new changes. Case dw Dr Camargo and cardiology fellow. Patient stable for discharge home FU DR Linares in one week. FU PMD in one week for COVID-19 44yo F with PMH HTN, smoker, CAD s/p AWSTEMI s/p THIAGO to pLAD and mLAD in 12/2020, EF 40% on asa and brilinta who presents with chest pain since last night. Pain is pressure in mid sternal area, radiating to b/l shoulder, upper back, exertional, associated with SOB, fatigue, similar to last visit when she got cardiac stents.  Took asa 81 this AM.   Hospital course: Patient underwent LHC with no new changes. Chest pain completely resolved.   Patient incidentally found to be COVID positive, though asymptomatic. Patient advised to quarantine at home and provided strict return precautions.     Case dw Dr Camargo and cardiology fellow. Patient stable for discharge home FU DR Linares in one week. FU PMD in one week for COVID-19

## 2021-02-26 NOTE — DISCHARGE NOTE NURSING/CASE MANAGEMENT/SOCIAL WORK - NSDCFUADDAPPT_GEN_ALL_CORE_FT
No driving x 24 hours. No strenuous activity for three weeks. Monitor site of procedure and notify your doctor for any  bleeding / swelling/redness/ discharge/pain. You may shower but no baths or swimming x one week. No heavy lifting x 1 week.        Follow up with your primary care doctor for COVID-19 in one week

## 2021-02-26 NOTE — CONSULT NOTE ADULT - SUBJECTIVE AND OBJECTIVE BOX
Patient seen and evaluated at bedside    Reason for consult: chest pain    HPI:  45F active smoker, recent AWSTEMI in 12/2020 s/p PCI to LAD (PCI to prox and mid; has 50% lesion in mRCA that was not intervened on) who presents with one day of chest discomfort and a burning sensation on her back. In December when she came in for her STEMI, she had anterior chest burning, shoulder/back pain. She is active at baseline and can walk multiple miles without symptoms. Yesterday, she was at work as a , with mild exertion (carrying bags) when she noted her chest discomfort. It was 2/10 and resolved with rest. It did not reoccur. However, she noted upper back burning that was not always associated with her chest pain. Both pain issues lasted minutes and resolved. Currently, she is not symptomatic. ECG showed TWI in V1-V3 and negative trop x 2.       PMHx:   CAD (coronary artery disease); STEMI  Hypertension    PSHx:   Benign ovarian tumor, left    Allergies:  Wellbutrin (Rash)    Home Meds:  · 	nicotine 14 mg/24 hr transdermal film, extended release: 1 patch transdermal once a day  · 	pantoprazole 40 mg oral delayed release tablet: 1 tab(s) orally once a day (before a meal)  · 	carvedilol 12.5 mg oral tablet: 1 tab(s) orally every 12 hours  · 	Brilinta (ticagrelor) 90 mg oral tablet: 1 tab(s) orally every 12 hours   · 	aspirin 81 mg oral delayed release tablet: 1 tab(s) orally once a day  · 	atorvastatin 80 mg oral tablet: 1 tab(s) orally once a day (at bedtime)  · 	losartan 100 mg oral tablet: 1 tab(s) orally once a day  · 	LORazepam 0.5 mg oral tablet: 1 tab(s) orally once a day, As NeededCurrent Medications:       FAMILY HISTORY:  No pertinent family history in first degree relatives        Social History: active smoker (multiple years w/ 5 cigarettes per day)    Review of Systems:  REVIEW OF SYSTEMS:  CONSTITUTIONAL: No weakness, fevers or chills  EYES/ENT: No visual changes;  No dysphagia  NECK: No pain or stiffness  RESPIRATORY: No cough, wheezing, hemoptysis; No shortness of breath  CARDIOVASCULAR: no palpitations; No lower extremity edema  GASTROINTESTINAL: No abdominal or epigastric pain. No nausea, vomiting, or hematemesis; No diarrhea or constipation. No melena or hematochezia.  BACK: see above  GENITOURINARY: No dysuria, frequency or hematuria  NEUROLOGICAL: No numbness or weakness  SKIN: No itching, burning, rashes, or lesions   All other review of systems is negative unless indicated above.    [ ] All other systems negative  [ ] Unable to assess ROS due to    Physical Exam:  T(F): 98 (02-26), Max: 98 (02-26)  HR: 70 (02-26) (60 - 70)  BP: 136/88 (02-26) (126/92 - 145/90)  RR: 16 (02-26)  SpO2: 100% (02-26)  GENERAL: No acute distress, well-developed  HEAD:  Atraumatic, Normocephalic  ENT: EOMI, PERRLA, conjunctiva and sclera clear, Neck supple, No JVD, moist mucosa  CHEST/LUNG: Clear to auscultation bilaterally; No wheeze, equal breath sounds bilaterally   BACK: No spinal tenderness  HEART: Regular rate and rhythm; No murmurs, rubs, or gallops  ABDOMEN: Soft, Nontender, Nondistended; Bowel sounds present  EXTREMITIES:  No clubbing, cyanosis, or edema  PSYCH: Nl behavior, nl affect  NEUROLOGY: AAOx3, non-focal, cranial nerves intact  SKIN: Normal color, No rashes or lesions      Cardiovascular Diagnostic Testing:    ECG: Personally reviewed:    Echo: Personally reviewed:    Stress Testing:    Cath:    Imaging:    CXR: Personally reviewed    Labs: Personally reviewed                        13.4   7.69  )-----------( 261      ( 26 Feb 2021 11:23 )             42.1     02-26    140  |  103  |  14  ----------------------------<  108<H>  3.7   |  27  |  0.57    Ca    9.1      26 Feb 2021 11:23    TPro  6.9  /  Alb  4.3  /  TBili  0.6  /  DBili  x   /  AST  10  /  ALT  16  /  AlkPhos  57  02-26    PT/INR - ( 26 Feb 2021 11:23 )   PT: 12.2 sec;   INR: 1.07 ratio         PTT - ( 26 Feb 2021 11:23 )  PTT:29.7 sec  Serum Pro-Brain Natriuretic Peptide: 149 pg/mL (02-26 @ 11:23)         Patient seen and evaluated at bedside    Reason for consult: chest pain    HPI:  45F active smoker, recent AWSTEMI in 2020 s/p PCI to LAD (PCI to prox and mid; has 50% lesion in mRCA that was not intervened on) who presents with one day of chest discomfort and a burning sensation on her back. In December when she came in for her STEMI, she had anterior chest burning, shoulder/back pain. She is active at baseline and can walk multiple miles without symptoms. Yesterday, she was at work as a , with mild exertion (carrying bags) when she noted her chest discomfort. It was 2/10 and resolved with rest. It did not reoccur. However, she noted upper back burning that was not always associated with her chest pain. Both pain issues lasted minutes and resolved. Currently, she is not symptomatic. ECG showed TWI in V1-V3 and negative trop x 2.       PMHx:   CAD (coronary artery disease); STEMI  Hypertension    PSHx:   Benign ovarian tumor, left    Allergies:  Wellbutrin (Rash)    Home Meds:  · 	nicotine 14 mg/24 hr transdermal film, extended release: 1 patch transdermal once a day  · 	pantoprazole 40 mg oral delayed release tablet: 1 tab(s) orally once a day (before a meal)  · 	carvedilol 12.5 mg oral tablet: 1 tab(s) orally every 12 hours  · 	Brilinta (ticagrelor) 90 mg oral tablet: 1 tab(s) orally every 12 hours   · 	aspirin 81 mg oral delayed release tablet: 1 tab(s) orally once a day  · 	atorvastatin 80 mg oral tablet: 1 tab(s) orally once a day (at bedtime)  · 	losartan 100 mg oral tablet: 1 tab(s) orally once a day  · 	LORazepam 0.5 mg oral tablet: 1 tab(s) orally once a day, As NeededCurrent Medications:       FAMILY HISTORY:  No pertinent family history in first degree relatives        Social History: active smoker (multiple years w/ 5 cigarettes per day)    Review of Systems:  REVIEW OF SYSTEMS:  CONSTITUTIONAL: No weakness, fevers or chills  EYES/ENT: No visual changes;  No dysphagia  NECK: No pain or stiffness  RESPIRATORY: No cough, wheezing, hemoptysis; No shortness of breath  CARDIOVASCULAR: no palpitations; No lower extremity edema  GASTROINTESTINAL: No abdominal or epigastric pain. No nausea, vomiting, or hematemesis; No diarrhea or constipation. No melena or hematochezia.  BACK: see above  GENITOURINARY: No dysuria, frequency or hematuria  NEUROLOGICAL: No numbness or weakness  SKIN: No itching, burning, rashes, or lesions   All other review of systems is negative unless indicated above.    [ ] All other systems negative  [ ] Unable to assess ROS due to    Physical Exam:  T(F): 98 (), Max: 98 ()  HR: 70 () (60 - 70)  BP: 136/88 () (126/92 - 145/90)  RR: 16 ()  SpO2: 100% ()  GENERAL: No acute distress, well-developed  HEAD:  Atraumatic, Normocephalic  ENT: EOMI, PERRLA, conjunctiva and sclera clear, Neck supple, No JVD, moist mucosa  CHEST/LUNG: Clear to auscultation bilaterally; No wheeze, equal breath sounds bilaterally   BACK: No spinal tenderness  HEART: Regular rate and rhythm; No murmurs, rubs, or gallops  ABDOMEN: Soft, Nontender, Nondistended; Bowel sounds present  EXTREMITIES:  No clubbing, cyanosis, or edema  PSYCH: Nl behavior, nl affect  NEUROLOGY: AAOx3, non-focal, cranial nerves intact  SKIN: Normal color, No rashes or lesions      Cardiovascular Diagnostic Testing:    < from: Cardiac Cath Lab - Adult (20 @ 01:34) >  PROCEDURE:  --  Left heart catheterization with ventriculography.  --  Left coronary angiography.  --  Right coronary angiography.  --  Interventional IVUS.  --  Intervention on proximal LAD: drug-eluting stent, balloon angioplasty.  --  Intervention on mid LAD: drug-eluting stent, balloon angioplasty.  TECHNIQUE: Oxygen 2 L/min. The risks and alternatives of the procedures and  conscious sedation were explained to the patient and informed consent was  obtained. Cardiac catheterization performed emergently. Coronary  intervention performed emergently.  Local anesthetic given. Right radial artery access. Local anesthetic given.  A 6 Fr. Radial Glidesheath Slender was inserted in the vessel, utilizing  the modified Seldinger technique. Left heart catheterization.  Ventriculography was performed ( 20 ml). A 5fr Pigtail Angled 145 Expo  catheter was utilized. After recording ascending aortic pressure, the  catheter was advanced across the aortic valve and left ventricular  pressure was recorded. Imaging was performed using an NIELSEN projection.  Post-ventriculography LV pressure was obtained. The catheter was gradually  withdrawn into the aorta under continuous pressure monitoring and aortic  pressure was recorded. Left coronary artery angiography. The vessel was  injected utilizing a 6 Fr. JL 3.5 Launcher catheter and positioned in the  vessel ostia under fluoroscopic guidance. Angiography was performed in  multiple projections using hand-injection of contrast. Right coronary  artery angiography. The vessel was injected utilizing a 5 Fr. DxTerity JR  4.0 catheter and positioned in the vessel ostia under fluoroscopic  guidance. Angiography was performed in multiple projections using  hand-injection of contrast. RADIATION EXPOSURE: 21.3 min. A successful  drug-eluting stent with balloon angioplasty was performed on the 100 %  lesion in the proximal LAD. Following intervention there was an excellent  angiographic appearance with a 1 % residual stenosis. Vessel setup was  performed. A 6 Fr. JL 3.5 Launcher guiding catheter was used to intubate  the vessel. Vessel setup was performed. A Asahi [place] Blue 180cm wire was  used to cross the lesion. Balloon angioplasty was performed, using a 2.5 X  15 [place] MR balloon, with 3 inflations and a maximum inflation pressure of  8 karla. A 4.0 X 22 Resolute Roe drug-eluting stent placement was  attempted, but would not cross the lesion and was removed. A 4.0 X 30  Resolute Roe drug-eluting stent wasplaced across the lesion and deployed  at a maximum inflation pressure of 14 karla. Balloon angioplasty was  performed, using a 4.5 X 20 Euphora NC balloon, with 3 inflations and a  maximum inflation pressure of 14 karla. Balloon angioplasty was performed,  using a 3.5 X 15 Euphora NC balloon, with 3 inflations and a maximum  inflation pressure of 20 karla. A successful drug-eluting stent with balloon  angioplasty was performed on the 90 % lesion in the mid LAD. Following  intervention there was an excellent angiographic appearance with a 1 %  residual stenosis. Vessel setup was performed. A 6 Fr. JL 3.5 Launcher  guiding catheter was used to intubate the vessel. Vessel setup was  performed. A Asahi Z[place] Blue 180cm wire was used to cross the lesion. A  3.0 X 38 Resolute Spencer drug-eluting stent was placed across the lesion and  deployed at a maximum inflation pressure of 16 karla. Balloon angioplasty  was performed, using a 3.5 X 15 Euphora NC balloon, with 3 inflations and  a maximum inflation pressure of 20 karla. Interventional IVUS.  CONTRAST GIVEN: 60 ml Optiray. 150 ml Optiray.  MEDICATIONS GIVEN: Midazolam, 1 mg, IV. Fentanyl, 25 mcg, IV. Labetalol  (Trandate), 10 mg, IV. Nitroglycerin, 100 mcg, intracoronary.  Nitroglycerin, 100 mcg, intracoronary. Heparin, 6000 units, IV. 2%  Lidocaine, 3 ml, subcutaneously. Cardene, 400 mcg. Cardene, 400 mcg.  Cardene, 400 mcg. Cardene, 200 mcg. 0.9% Normal saline, 70 ml, IV.  VENTRICLES: Analysis of regional contractile function demonstrated severe  anterolateral hypokinesis, severe apical hypokinesis, and severe  diaphragmatic hypokinesis. EF estimated was 40 %.  CORONARY VESSELS: The coronary circulation is right dominant.  LM:   --  LM: Angiography showed mild atherosclerosis with no flow limiting  lesions.  LAD:   --  Proximal LAD: There was a diffuse 100 % stenosis at a site with  no prior intervention. The lesion was complex, eccentric, and associated  with a large filling defect consistent with thrombus. There was AGATHA grade  0 flowthrough the vessel (no flow) and a large vascular territory distal  to the lesion. This is a likely culprit for the patient's clinical  presentation. An intervention was performed.  --  Mid LAD: There was a diffuse 90 % stenosis at a site with no prior  intervention. The lesion was complex and eccentric. There was AGATHA grade 2  flow through the vessel (partial perfusion). This is a likely culprit for  the patient's clinical presentation. An intervention was performed.  --  D1: Angiography showed mild atherosclerosis with no flow limiting  lesions.  CX:   --  Circumflex: Angiography showed mild atherosclerosis with no flow  limiting lesions.  RI:   --  Ramus intermedius: Angiography showed mild atherosclerosis with  no flow limiting lesions.  RCA:   --  Mid RCA: There was a tubular 50 % stenosis at a site with no  prior intervention. There was AGATHA grade 3 flow through the vessel (brisk  flow).  --  RPDA: Angiography showed mild atherosclerosis with no flow limiting  lesions.  --  RPLS: Angiography showed mild atherosclerosis with no flow limiting  lesions.  COMPLICATIONS: No complications occurred during the cath lab visit.  DIAGNOSTIC IMPRESSIONS: Successful PCI to severe stenosis/occlusion of mid  and proximal LAD using ROE THIAGO (3.0 and 4.0mm stents)  IVUS was used for stent optimization  Moderate LV dysfunction  DIAGNOSTIC RECOMMENDATIONS: DAPT x 12 months  Aggressive risk factor modification  INTERVENTIONAL IMPRESSIONS: Successful PCI to severe stenosis/occlusion of  mid and proximal LAD using ROE THIAGO (3.0 and 4.0mm stents)  IVUS was used for stent optimization  Moderate LV dysfunction  INTERVENTIONAL RECOMMENDATIONS: DAPT x 12 months  Aggressive risk factor modification  Prepared and signed by  Belkys Gutierrez M.D.  Signed 2020 12:38:39  HEMODYNAMIC TABLES  Pressures:  Baseline  Pressures:  - HR: 87  Pressures:  - Rhythm:  Pressures:  -- Aortic Pressure (S/D/M): 199/119/158  Pressures:  -- Left Ventricle (s/edp): 185/33/--  Pressures:  Intervention  Pressures:  - HR: 83  Pressures:  - Rhythm:  Pressures:  -- Aortic Pressure (S/D/M): 179/120/145  Pressures:  Post Angio  Pressures:  - HR: 87  Pressures:  - Rhythm:  Pressures:  -- Aortic Pressure (S/D/M): 200/129/152  Pressures:  -- Left Ventricle (s/edp): 188/36/--  Outputs:  Baseline  Outputs:  -- CALCULATIONS[age]  Outputs:  -- CALCULATIONS: Body Surface Area: 2.03  Outputs:  -- CALCULATIONS: Height in cm: 170.00  Outputs:  -- CALCULATIONS: Sex: Female  Outputs:  -- CALCULATIONS: Weight in k.10  Outputs:  -- OUTPUTS: O2 consumption: 254.19  Outputs:  -- OUTPUTS: Vo2 Indexed: 125.00  Outputs:  Intervention  Outputs:  -- OUTPUTS: O2 consumption: 254.19  Outputs:  -- OUTPUTS: Vo2 Indexed: 125.00  Outputs:  Post Angio  Outputs:  -- OUTPUTS: O2 consumption: 254.19  Outputs:  -- OUTPUTS: Vo2 Indexed: 125.00    < end of copied text >    CXR: Personally reviewed    Labs: Personally reviewed                        13.4   7.69  )-----------( 261      ( 2021 11:23 )             42.1         140  |  103  |  14  ----------------------------<  108<H>  3.7   |  27  |  0.57    Ca    9.1      2021 11:23    TPro  6.9  /  Alb  4.3  /  TBili  0.6  /  DBili  x   /  AST  10  /  ALT  16  /  AlkPhos  57      PT/INR - ( 2021 11:23 )   PT: 12.2 sec;   INR: 1.07 ratio         PTT - ( 2021 11:23 )  PTT:29.7 sec  Serum Pro-Brain Natriuretic Peptide: 149 pg/mL ( @ 11:23)

## 2021-02-26 NOTE — DISCHARGE NOTE NURSING/CASE MANAGEMENT/SOCIAL WORK - PATIENT PORTAL LINK FT
You can access the FollowMyHealth Patient Portal offered by [facility] by registering at the following website: [email or web address]. By joining [facility]’s FollowMyHealth portal, you will also be able to view your health information using other applications (apps) compatible with our system.

## 2021-02-26 NOTE — H&P ADULT - ATTENDING COMMENTS
45 y.o. F w/ a hx of CAD c/b STEMI s/p THIAGO to pLAD (12/20), HTN, tobacco use who presents with chest pain x 1 day. Patient reports she "felt her heart" and noted some pressure as well as a band of burning pain stretching between her shoulders which was worse with movement. Now s/p LHC without any obstructive lesions. CP is completely resolved. Incidentally found to be COVID +.     # CP: Not cardiac in nature, completely resolved. May be MSK given exacerbation w/ movement.   # COVID +: On RA, CXray clear. Advised patient to monitor sx and quarantine per CDC guidelines.     Stable for D/C home today. 45 y.o. F w/ a hx of CAD c/b STEMI s/p THIAGO to pLAD (12/20), HTN, tobacco use who presents with chest pain x 1 day. Patient reports she "felt her heart" and noted some pressure as well as a band of burning pain stretching between her shoulders which was worse with movement. Now s/p LHC without any obstructive lesions. CP is completely resolved. Incidentally found to be COVID +.     # CP: Not cardiac in nature, completely resolved. May be MSK given exacerbation w/ movement.   # COVID +: On RA, CXray clear, completely asymptomatic aside from possible MSK pain. Advised patient to monitor sx and quarantine per CDC guidelines. Patient is at higher risk for COVID complications though as she is asymptomatic, well appearing and has no respiratory complaints, advised the patient monitor at home. Strict return precautions provided.     Stable for D/C home today.

## 2021-02-26 NOTE — H&P ADULT - NSHPSOCIALHISTORY_GEN_ALL_CORE
Results in MD note
Current smoker- is trying to quit- now down to 1 cigarette /day  NO ETOH  NO drugs  Works as a

## 2021-02-26 NOTE — ED PROVIDER NOTE - CLINICAL SUMMARY MEDICAL DECISION MAKING FREE TEXT BOX
Kay Shrestha MD: 44yo F with PMH PMH HTN, active smoker, CAD s/p THIAGO to pLAD and mLAD in 12/2020, EF 40% on asa and brilinta who presents with chest pain since last night with TWI that are changed in V1-V3 compared to old EKG concerning for ACS given high risk history and similar presenting symptoms. Will get labs, troponin, CXR, repeat EKG, given aspirin, cardiology consult, admit

## 2021-02-26 NOTE — ED ADULT NURSE NOTE - OBJECTIVE STATEMENT
Pt presents to ED with intermittent mid sternal chest pain and upper back pain radiating across the back that started last night. Pt AxOx3, ambulatory. Pt states the chest pain comes and goes and is nonreproducible. pt NSR on the monitor. pt denies shortness of breath, but states she has some SOB on exertion. Pt denies abd pain, n/v/d. pt denies dysuria and hematuria. Skin clean dry and intact. 20g IVL placed in the L arm. Pt took her aspirin 81mg and brilinta today. pt recently had 2 stents placed in December. Will continue to monitor.

## 2021-02-26 NOTE — DISCHARGE NOTE PROVIDER - CARE PROVIDER_API CALL
Loren Linares)  Cardiovascular Disease  [facility]-Dept of Cardiology, [phone] [place] [address], [id number]  [place] [address]  Phone: [phone]  Fax: [phone]  Follow Up Time:

## 2021-02-26 NOTE — ED PROVIDER NOTE - PROGRESS NOTE DETAILS
Kay Shrestha MD: CP resolved. TWI in V3 now flat. Trop 7, will repeat in 1 hour. Cardiology consulted Kay Shrestha MD: Linda miranda. Will admit to medicine Kay Shrestha MD: CP resolved. Trop 7, will repeat in 1 hour. Cardiology consulted

## 2021-02-26 NOTE — CONSULT NOTE ADULT - ATTENDING COMMENTS
The patient was seen and examined with the cardiology consultation team.    She is a 45-year-old female smoker with coronary artery disease and recent anterior wall KATHRYN-ACS with subsequent PCI to the LAD two months ago, who presents with a recurrent chest pain syndrome that is similar to her pain during her MI. The is otherwise comfortable-appearing. There are no signs of heart failure on examination. hs-troponin is normal and ECG demonstrates sinus rhythm with anteroseptal Q waves. There are no significant ST-depressions.     While thus far, the patient's work up has been unrevealing, she is quite concerned with the similarity of her symptoms to those during her recent MI. Echocardiography may be repeated, but there were anterior wall abnormalities noted previously. It is unclear to me how reliable stress testing with MPI will be this soon after PCI. This can be reviewed with the stress lab, but it would be reasonable to perform diagnostic angiography to reassess the integrity of the stent and other vessels. In discussion, this seems to be the patient's strong preference. Case reviewed with the original interventional cardiologist.    Chris Navarrete MD  Cardiology  [phone]

## 2021-02-26 NOTE — H&P ADULT - PROBLEM SELECTOR PLAN 1
Unstable angina  Trop=7-->7  ekg:SR @ 59 bpm New lipped Ts V1- V2  CXR: BRADLEY  Cardiology following   asa, brinlinta, coreg, losartan, lipitor  Nicotine patch  NPo for cath  Case to be discussed with hospitalists Unstable angina  Trop=7-->7  Ekg:SR @ 59 bpm New Flipped Ts V1- V2  CXR: BRADLEY  Cardiology following   asa, brinlinta, coreg, losartan, lipitor  Nicotine patch  NPo for cath  Case to be discussed with hospitalist DR Merlin Mathew

## 2021-02-26 NOTE — H&P ADULT - NSICDXPASTMEDICALHX_GEN_ALL_CORE_FT
PAST MEDICAL HISTORY:  CAD (coronary artery disease)     Hypertension     No pertinent past medical history

## 2021-02-26 NOTE — H&P ADULT - ASSESSMENT
46yo F with PMH HTN, smoker, CAD s/p AWSTEMI s/p THIAGO to pLAD and mLAD in 12/2020, EF 40% on asa and brilinta who presents with Unstable angina

## 2021-02-26 NOTE — DISCHARGE NOTE PROVIDER - NSDCCPCAREPLAN_GEN_ALL_CORE_FT
PRINCIPAL DISCHARGE DIAGNOSIS  Diagnosis: Chest pain  Assessment and Plan of Treatment: Continue aspirin, brilinta, lipitor, losartan, coreg  Please stop smoking- continue nicotine patch  Follow up with Dr Burris in one week      SECONDARY DISCHARGE DIAGNOSES  Diagnosis: Hypertension  Assessment and Plan of Treatment: Hypertension  low salt, low fat, low cholesterol     PRINCIPAL DISCHARGE DIAGNOSIS  Diagnosis: Chest pain  Assessment and Plan of Treatment: Continue aspirin, brilinta, lipitor, losartan, coreg  Please stop smoking- continue nicotine patch  Follow up with Dr Burris in one week      SECONDARY DISCHARGE DIAGNOSES  Diagnosis: 2019 novel coronavirus disease (COVID-19)  Assessment and Plan of Treatment: You have been diagnosed with the COVID-19 virus during your hospital stay. Follow up with your [facility] clif in one week. You must self quarantine to complete a 14 day time period.  Monitor for fevers, shortness of breath and cough primarily.  Monitor your temperature daily to not any changes and increases.    It has been determined that you no longer need hospitalization and can recover while remaining in self-quarantine at home. You should follow the prevention steps below until a healthcare provider or local or state health department says you can return to your normal activities.  1. You should restrict activities outside your home, except for getting medical care.  2. Do not go to work, school, or public areas.  3. Avoid using public transportation, ride-sharing, or taxis.  4. Separate yourself from other people and animals in your home.  5. Call ahead before visiting your doctor.  6. Wear a facemask.  7. Cover your coughs and sneezes.  8. Clean your hands often.  9. Avoid sharing personal household items.  10. Clean all “high-touch” surfaces everyday.  11. Monitor your symptoms.  If you have a medical emergency and need to call 911, notify the dispatch personnel that you have COVID-19 If possible, put on a facemask before emergency medical services arrive.  12. Stopping home isolation.  Patients with confirmed COVID-19 should remain under home isolation precautions for 14 days since the positive COVID-19 test and until the risk of secondary transmission to others is thought to be low. The decision to discontinue home isolation precautions should be made on a case-by-case basis, in consultation with healthcare providers and state and local health departments. Your [facility] Department of Health can be reached at [phone] for further information about COVID-19.    Diagnosis: Hypertension  Assessment and Plan of Treatment: Hypertension  low salt, low fat, low cholesterol

## 2021-02-26 NOTE — ED ADULT NURSE REASSESSMENT NOTE - NS ED NURSE REASSESS COMMENT FT1
report given to cath lab MERCEDES riggs. pt brought upstairs with transporter and RN on zoll monitor.

## 2021-02-26 NOTE — DISCHARGE NOTE PROVIDER - NSDCMRMEDTOKEN_GEN_ALL_CORE_FT
aspirin 81 mg oral delayed release tablet: 1 tab(s) orally once a day  atorvastatin 80 mg oral tablet: 1 tab(s) orally once a day (at bedtime)  Brilinta (ticagrelor) 90 mg oral tablet: 1 tab(s) orally every 12 hours   carvedilol 12.5 mg oral tablet: 1 tab(s) orally every 12 hours  Cozaar 100 mg oral tablet: 1 tab(s) orally once a day  LORazepam 0.5 mg oral tablet: 1 tab(s) orally once a day, As Needed  nicotine 14 mg/24 hr transdermal film, extended release: 1 patch transdermal once a day  pantoprazole 40 mg oral delayed release tablet: 1 tab(s) orally once a day (before a meal)

## 2021-02-26 NOTE — ED ADULT NURSE NOTE - NSIMPLEMENTINTERV_GEN_ALL_ED
Implemented All Universal Safety Interventions:  [place] to call system. Call bell, personal items and telephone within reach. Instruct patient to call for assistance. Room bathroom lighting operational. Non-slip footwear when patient is off stretcher. Physically safe environment: no spills, clutter or unnecessary equipment. Stretcher in lowest position, wheels locked, appropriate side rails in place.

## 2021-02-26 NOTE — DISCHARGE NOTE PROVIDER - NSDCACTIVITY_GEN_ALL_CORE
No restrictions/Do not drive or operate machinery/Do not make important decisions/Walking - Indoors allowed/No heavy lifting/straining

## 2021-02-26 NOTE — H&P ADULT - NEGATIVE GASTROINTESTINAL SYMPTOMS
no nausea/no vomiting/no diarrhea/no constipation/no change in bowel habits/no flatulence/no abdominal pain/no melena/no hematochezia/no steatorrhea/no hiccoughs

## 2021-02-26 NOTE — H&P ADULT - HISTORY OF PRESENT ILLNESS
46yo F with PMH HTN, smoker, CAD s/p AWSTEMI s/p THIAGO to pLAD and mLAD in 12/2020, EF 40% on asa and brilinta who presents with chest pain since last night. Pain is pressure 4/10 in mid sternal area, radiating to b/l shoulder, upper back, worse with exertion ( walking > 20 feet), associated with SOB, fatigue, similar to last visit when she got cardiac stents. No other alleviating or aggravating factors. NO chest pain, SOB at rest, BARRIGA, PND, orthopnea, palpitations, diaphoresis, lightheadedness, dizziness, syncope, increased lower extremity edema, fever chills, malaise, myalgias, anorexia, weight changes ( loss or gain), night sweats, generalized fatigue, abdominal pain, N/V/C/D BRBPR, melena, urinary symptoms, cough, and wheezing.   44yo F with PMH HTN, smoker, CAD s/p AWSTEMI s/p THIAGO to pLAD and mLAD in 12/2020, EF 40% on asa and brilinta who presents with chest pain since last night. Pain is pressure 4/10 in mid sternal area, radiating to b/l shoulder, upper back, worse with exertion ( walking > 20 feet), associated with SOB, fatigue, similar to last visit when she got cardiac stents. No other alleviating or aggravating factors. NO  PND, orthopnea, palpitations, diaphoresis, lightheadedness, dizziness, syncope, increased lower extremity edema, fever chills, malaise, myalgias, anorexia, weight changes ( loss or gain), night sweats, generalized fatigue, abdominal pain, N/V/C/D BRBPR, melena, urinary symptoms, cough, and wheezing.        Cath12/2020: -  D1: Angiography showed mild atherosclerosis with no flow limiting  lesions.  CX:   --  Circumflex: Angiography showed mild atherosclerosis with no flow  limiting lesions.  RI:   --  Ramus intermedius: Angiography showed mild atherosclerosis with  no flow limiting lesions.  RCA:   --  Mid RCA: There was a tubular 50 % stenosis at a site with no  prior intervention. There was AGATHA grade 3 flow through the vessel (brisk  flow).  --  RPDA: Angiography showed mild atherosclerosis with no flow limiting  lesions.  --  RPLS: Angiography showed mild atherosclerosis with no flow limiting  lesions.

## 2021-02-26 NOTE — ED PROVIDER NOTE - ATTENDING CONTRIBUTION TO CARE
DR. CHAU, ATTENDING MD-  I performed a face to face bedside interview with the patient regarding history of present illness, review of symptoms and past medical history. I completed an independent physical exam.  I have discussed the patient's plan of care with the resident.   Documentation as above in the note.    44 y/o female h/o cad with stents, htn, smoker here with cp.  Admit for acs w/u.  Obtain cbc cmp trop cxr ekg covid swab give asa.

## 2021-02-26 NOTE — ED PROVIDER NOTE - PHYSICAL EXAMINATION
CONSTITUTIONAL: Nontoxic, well nourished, well developed, middle-aged female, anxious appearing  HEAD: Normocephalic; atraumatic  EYES: Normal inspection, EOMI  ENMT: External appears normal; normal oropharynx  NECK: Supple; non-tender; no cervical lymphadenopathy  CARD: RRR; no audible murmurs, rubs, or gallops  RESP: No respiratory distress, lungs ctab/l  ABD: Soft, non-distended; non-tender; no rebound or guarding  EXT: No LE pitting edema or calf tenderness; distal pulses intact with good capillary refill  SKIN: Warm, dry, intact  NEURO: aaox3, moving all extremities spontaneously

## 2021-02-26 NOTE — DISCHARGE NOTE PROVIDER - NSDCFUADDAPPT_GEN_ALL_CORE_FT
No driving x 24 hours. No strenuous activity for three weeks. Monitor site of procedure and notify your doctor for any  bleeding / swelling/redness/ discharge/pain. You may shower but no baths or swimming x one week. No heavy lifting x 1 week.   No driving x 24 hours. No strenuous activity for three weeks. Monitor site of procedure and notify your doctor for any  bleeding / swelling/redness/ discharge/pain. You may shower but no baths or swimming x one week. No heavy lifting x 1 week.        Follow up with your primary care doctor for COVID-19 in one week

## 2021-02-26 NOTE — ED PROVIDER NOTE - NS ED ROS FT
General: denies fever, chills  HENT: denies nasal congestion, sore throat, rhinorrhea  Eyes: denies vision changes  CV: +chest pain  Resp: +difficulty breathing, denies cough  Abdominal: denies nausea, vomiting, diarrhea, abdominal pain, blood in stool, dark stool  : denies pain with urination  MSK: denies recent trauma  Neuro: denies headaches, numbness, tingling, dizziness, lightheadedness.  Skin: denies new rashes  Endocrine: denies recent weight loss

## 2021-02-26 NOTE — CONSULT NOTE ADULT - ASSESSMENT
45F active smoker, recent AWSTEMI in 12/2020 s/p PCI to LAD (PCI to prox and mid; has 50% lesion in mRCA that was not intervened on) who presents with one day of chest discomfort and a burning sensation on her back. Currently, enzymes negative and ECG w/ q waves and TWI in anterior precordial leads. Given that her symptoms have resolved and she is otherwise HDS, repeat cath at this time is not urgent. However, her coronaries should be re-examined for workup.    Recs:  - CTM on tele  - COVID swab  - con't home cardiac meds  - echo  - will consider cath after echo    Neto Paz MD  Cardiology Fellow PGY-4  [facility]    Notes are not final until signed by attending  For all consults and questions:  [email or web address]   Login: elida

## 2021-03-16 ENCOUNTER — NON-APPOINTMENT (OUTPATIENT)
Age: 46
End: 2021-03-16

## 2021-03-16 ENCOUNTER — APPOINTMENT (OUTPATIENT)
Dept: CARDIOLOGY | Facility: CLINIC | Age: 46
End: 2021-03-16
Payer: COMMERCIAL

## 2021-03-16 VITALS
WEIGHT: 213 LBS | SYSTOLIC BLOOD PRESSURE: 131 MMHG | RESPIRATION RATE: 14 BRPM | HEIGHT: 67 IN | TEMPERATURE: 97.6 F | BODY MASS INDEX: 33.43 KG/M2 | DIASTOLIC BLOOD PRESSURE: 89 MMHG | HEART RATE: 63 BPM | OXYGEN SATURATION: 97 %

## 2021-03-16 PROBLEM — I25.10 ATHEROSCLEROTIC HEART DISEASE OF NATIVE CORONARY ARTERY WITHOUT ANGINA PECTORIS: Chronic | Status: ACTIVE | Noted: 2021-02-26

## 2021-03-16 PROCEDURE — 99072 ADDL SUPL MATRL&STAF TM PHE: CPT

## 2021-03-16 PROCEDURE — 99214 OFFICE O/P EST MOD 30 MIN: CPT

## 2021-03-16 PROCEDURE — 93000 ELECTROCARDIOGRAM COMPLETE: CPT

## 2021-03-16 RX ORDER — CARVEDILOL 12.5 MG/1
12.5 TABLET, FILM COATED ORAL TWICE DAILY
Qty: 180 | Refills: 3 | Status: DISCONTINUED | COMMUNITY
End: 2021-03-16

## 2021-03-16 NOTE — HISTORY OF PRESENT ILLNESS
[FreeTextEntry1] : 45 year old woman [facility]. She was last admitted in Feb with chest pain; LHC showed no progression of disease and she was found to be COVID positive. She is very frustrated, cannot understand why this happened to her. She states that she is tired and occasionally forgetful.\par #CAD- AWMI on 12/3/2020 with PCI x 2 to LAD on ASA and Brilinta.Last cath in Feb showed no progression of disease.EF did imporve somewhat to 40-45% on Vgram.  Condition is stable. Continue present meds\par EKG reviewed and no new changes. She thinks that the Brilinta may be contributing to her forgetfulness however she is willing to remain on it for now. \par #HFrEF- Moderate severe LV dysfunction. On Coreg 12.5 mg BID, Losartan 100 mg daily. Given concerns of fatigue- will change her Coreg to Toprol 50 mg daily and continue with Losartan. \par Check TTE\par #HTN-Continue present meds\par #HLD- Continue Lipitor 40 mg daily; continue present meds

## 2021-03-16 NOTE — DISCUSSION/SUMMARY
[FreeTextEntry1] : 45 year old woman with CAD HFrEF HTN HLD here for followup\par \par #CAD- AWMI on 12/3/2020 with PCI x 2 to LAD on ASA and Brilinta.Last cath in Feb showed no progression of disease.EF did imporve somewhat to 40-45% on Vgram.  Condition is stable. Continue present meds\par EKG reviewed and no new changes. She thinks that the Brilinta may be contributing to her forgetfulness however she is willing to remain on it for now. \par #HFrEF- Moderate severe LV dysfunction. On Coreg 12.5 mg BID, Losartan 100 mg daily. Given concerns of fatigue- will change her Coreg to Toprol 50 mg daily and continue with Losartan. \par Check TTE\par #HTN-Continue present meds\par #HLD- Continue Lipitor 40 mg daily; continue present meds\par #FU in 2 months

## 2021-04-05 NOTE — H&P ADULT - NEUROLOGICAL
Anesthesia Evaluation     history of anesthetic complications (hypoxia):  NPO Solid Status: > 8 hours  NPO Liquid Status: > 2 hours           Airway   Mallampati: II  TM distance: >3 FB  Neck ROM: full  Dental    (+) upper dentures    Comment: Lower bridge    Pulmonary    (+) pneumonia , a smoker Former, COPD severe, asthma,home oxygen, shortness of breath,     ROS comment: Hx of respiratory failure, pulmonary fibrosis, home O2 3-7 LPM  Cardiovascular     (+) hypertension, cardiac stents dysrhythmias Atrial Fib, hyperlipidemia,       Neuro/Psych  GI/Hepatic/Renal/Endo    (-) liver disease, no renal disease, diabetes    Musculoskeletal     Abdominal    Substance History      OB/GYN          Other                          Anesthesia Plan    ASA 4     MAC       Anesthetic plan, all risks, benefits, and alternatives have been provided, discussed and informed consent has been obtained with: patient.    Plan discussed with CRNA.      
negative

## 2021-04-27 ENCOUNTER — APPOINTMENT (OUTPATIENT)
Dept: CARDIOLOGY | Facility: CLINIC | Age: 46
End: 2021-04-27

## 2021-04-27 ENCOUNTER — APPOINTMENT (OUTPATIENT)
Dept: CV DIAGNOSITCS | Facility: HOSPITAL | Age: 46
End: 2021-04-27

## 2021-04-29 NOTE — HISTORY OF PRESENT ILLNESS
[FreeTextEntry1] : 45 year old woman [facility] She was last admitted in Feb with chest pain; LHC showed no progression of disease and she was found to be COVID positive. She is very frustrated, cannot understand why this happened to her. She states that she is tired and occasionally forgetful.\par #CAD- AWMI on 12/3/2020 with PCI x 2 to LAD on ASA and Brilinta.Last cath in Feb showed no progression of disease.EF did imporve somewhat to 40-45% on Vgram.  Condition is stable. Continue present meds\par EKG reviewed and no new changes. She thinks that the Brilinta may be contributing to her forgetfulness however she is willing to remain on it for now. \par #HFrEF- Moderate severe LV dysfunction. On Coreg 12.5 mg BID, Losartan 100 mg daily. Given concerns of fatigue- will change her Coreg to Toprol 50 mg daily and continue with Losartan. \par Check TTE\par #HTN-Continue present meds\par #HLD- Continue Lipitor 40 mg daily; continue present meds

## 2021-05-11 ENCOUNTER — EMERGENCY (EMERGENCY)
Facility: HOSPITAL | Age: 46
LOS: 1 days | Discharge: HOME | End: 2021-05-11
Attending: EMERGENCY MEDICINE | Admitting: EMERGENCY MEDICINE
Payer: COMMERCIAL

## 2021-05-11 VITALS
HEART RATE: 58 BPM | TEMPERATURE: 98 F | RESPIRATION RATE: 18 BRPM | HEIGHT: 67 IN | DIASTOLIC BLOOD PRESSURE: 90 MMHG | SYSTOLIC BLOOD PRESSURE: 148 MMHG | OXYGEN SATURATION: 100 %

## 2021-05-11 VITALS
HEART RATE: 56 BPM | DIASTOLIC BLOOD PRESSURE: 103 MMHG | SYSTOLIC BLOOD PRESSURE: 164 MMHG | RESPIRATION RATE: 16 BRPM | OXYGEN SATURATION: 100 %

## 2021-05-11 DIAGNOSIS — D27.1 BENIGN NEOPLASM OF LEFT OVARY: Chronic | ICD-10-CM

## 2021-05-11 DIAGNOSIS — N83.201 UNSPECIFIED OVARIAN CYST, RIGHT SIDE: ICD-10-CM

## 2021-05-11 LAB
ALBUMIN SERPL ELPH-MCNC: 4.2 G/DL — SIGNIFICANT CHANGE UP (ref 3.3–5)
ALP SERPL-CCNC: 65 U/L — SIGNIFICANT CHANGE UP (ref 40–120)
ALT FLD-CCNC: 18 U/L — SIGNIFICANT CHANGE UP (ref 4–33)
ANION GAP SERPL CALC-SCNC: 9 MMOL/L — SIGNIFICANT CHANGE UP (ref 7–14)
APPEARANCE UR: CLEAR — SIGNIFICANT CHANGE UP
AST SERPL-CCNC: 11 U/L — SIGNIFICANT CHANGE UP (ref 4–32)
BASOPHILS # BLD AUTO: 0.09 K/UL — SIGNIFICANT CHANGE UP (ref 0–0.2)
BASOPHILS NFR BLD AUTO: 0.9 % — SIGNIFICANT CHANGE UP (ref 0–2)
BILIRUB SERPL-MCNC: 0.6 MG/DL — SIGNIFICANT CHANGE UP (ref 0.2–1.2)
BILIRUB UR-MCNC: NEGATIVE — SIGNIFICANT CHANGE UP
BLOOD GAS VENOUS COMPREHENSIVE RESULT: SIGNIFICANT CHANGE UP
BUN SERPL-MCNC: 10 MG/DL — SIGNIFICANT CHANGE UP (ref 7–23)
CALCIUM SERPL-MCNC: 8.7 MG/DL — SIGNIFICANT CHANGE UP (ref 8.4–10.5)
CHLORIDE SERPL-SCNC: 104 MMOL/L — SIGNIFICANT CHANGE UP (ref 98–107)
CO2 SERPL-SCNC: 26 MMOL/L — SIGNIFICANT CHANGE UP (ref 22–31)
COLOR SPEC: SIGNIFICANT CHANGE UP
CREAT SERPL-MCNC: 0.55 MG/DL — SIGNIFICANT CHANGE UP (ref 0.5–1.3)
DIFF PNL FLD: NEGATIVE — SIGNIFICANT CHANGE UP
EOSINOPHIL # BLD AUTO: 0.23 K/UL — SIGNIFICANT CHANGE UP (ref 0–0.5)
EOSINOPHIL NFR BLD AUTO: 2.3 % — SIGNIFICANT CHANGE UP (ref 0–6)
GLUCOSE SERPL-MCNC: 107 MG/DL — HIGH (ref 70–99)
GLUCOSE UR QL: NEGATIVE — SIGNIFICANT CHANGE UP
HCG SERPL-ACNC: <5 MIU/ML — SIGNIFICANT CHANGE UP
HCT VFR BLD CALC: 42.8 % — SIGNIFICANT CHANGE UP (ref 34.5–45)
HGB BLD-MCNC: 13.5 G/DL — SIGNIFICANT CHANGE UP (ref 11.5–15.5)
IANC: 7 K/UL — SIGNIFICANT CHANGE UP (ref 1.5–8.5)
IMM GRANULOCYTES NFR BLD AUTO: 0.4 % — SIGNIFICANT CHANGE UP (ref 0–1.5)
KETONES UR-MCNC: NEGATIVE — SIGNIFICANT CHANGE UP
LEUKOCYTE ESTERASE UR-ACNC: NEGATIVE — SIGNIFICANT CHANGE UP
LIDOCAIN IGE QN: 18 U/L — SIGNIFICANT CHANGE UP (ref 7–60)
LYMPHOCYTES # BLD AUTO: 1.77 K/UL — SIGNIFICANT CHANGE UP (ref 1–3.3)
LYMPHOCYTES # BLD AUTO: 18 % — SIGNIFICANT CHANGE UP (ref 13–44)
MCHC RBC-ENTMCNC: 27.6 PG — SIGNIFICANT CHANGE UP (ref 27–34)
MCHC RBC-ENTMCNC: 31.5 GM/DL — LOW (ref 32–36)
MCV RBC AUTO: 87.5 FL — SIGNIFICANT CHANGE UP (ref 80–100)
MONOCYTES # BLD AUTO: 0.68 K/UL — SIGNIFICANT CHANGE UP (ref 0–0.9)
MONOCYTES NFR BLD AUTO: 6.9 % — SIGNIFICANT CHANGE UP (ref 2–14)
NEUTROPHILS # BLD AUTO: 7 K/UL — SIGNIFICANT CHANGE UP (ref 1.8–7.4)
NEUTROPHILS NFR BLD AUTO: 71.5 % — SIGNIFICANT CHANGE UP (ref 43–77)
NITRITE UR-MCNC: NEGATIVE — SIGNIFICANT CHANGE UP
NRBC # BLD: 0 /100 WBCS — SIGNIFICANT CHANGE UP
NRBC # FLD: 0 K/UL — SIGNIFICANT CHANGE UP
PH UR: 7.5 — SIGNIFICANT CHANGE UP (ref 5–8)
PLATELET # BLD AUTO: 252 K/UL — SIGNIFICANT CHANGE UP (ref 150–400)
POTASSIUM SERPL-MCNC: 4.1 MMOL/L — SIGNIFICANT CHANGE UP (ref 3.5–5.3)
POTASSIUM SERPL-SCNC: 4.1 MMOL/L — SIGNIFICANT CHANGE UP (ref 3.5–5.3)
PROT SERPL-MCNC: 6.8 G/DL — SIGNIFICANT CHANGE UP (ref 6–8.3)
PROT UR-MCNC: NEGATIVE — SIGNIFICANT CHANGE UP
RBC # BLD: 4.89 M/UL — SIGNIFICANT CHANGE UP (ref 3.8–5.2)
RBC # FLD: 13.3 % — SIGNIFICANT CHANGE UP (ref 10.3–14.5)
SARS-COV-2 RNA SPEC QL NAA+PROBE: SIGNIFICANT CHANGE UP
SODIUM SERPL-SCNC: 139 MMOL/L — SIGNIFICANT CHANGE UP (ref 135–145)
SP GR SPEC: 1.01 — SIGNIFICANT CHANGE UP (ref 1.01–1.02)
UROBILINOGEN FLD QL: SIGNIFICANT CHANGE UP
WBC # BLD: 9.81 K/UL — SIGNIFICANT CHANGE UP (ref 3.8–10.5)
WBC # FLD AUTO: 9.81 K/UL — SIGNIFICANT CHANGE UP (ref 3.8–10.5)

## 2021-05-11 PROCEDURE — 74177 CT ABD & PELVIS W/CONTRAST: CPT | Mod: 26

## 2021-05-11 PROCEDURE — 76830 TRANSVAGINAL US NON-OB: CPT | Mod: 26

## 2021-05-11 PROCEDURE — 99285 EMERGENCY DEPT VISIT HI MDM: CPT

## 2021-05-11 NOTE — ED ADULT TRIAGE NOTE - CHIEF COMPLAINT QUOTE
Arrives from home c/o RLQ pain for 3 days, denies N/V/D. Sent from PCP for r/o appy, s/p hysterectomy 10 years ago. PMH recent MI on brilinta

## 2021-05-11 NOTE — ED PROVIDER NOTE - NS ED ROS FT
GENERAL: no fever, no chills  EYES: no change in vision, no irritation, no discharge, no redness, no pain  HEENT: no trouble swallowing or speaking, no throat pain, no ear pain  CARDIAC: no chest pain, no palpitations   PULMONARY: no cough, no shortness of breath, no wheezing  GI: +abdominal pain, no nausea, no vomiting, no diarrhea, no constipation, no melena, no hematochezia, no hematemesis  : no changes in urination, no dysuria, no frequency, no hematuria, no discharge  SKIN: no rashes  NEURO: no headache, no numbness, no weakness  MSK: no joint pain, no muscle pain, no back pain, no calf pain

## 2021-05-11 NOTE — ED ADULT NURSE NOTE - OBJECTIVE STATEMENT
pt received to 28, aox4.  pt c/o RLQ pain x 3 days with rebound tenderness.  pt denies n/v/d.  PMH: hysterectomy leaving behind right ovary.  pt denies vag bleed.  was sent to ED from PMD to r/o appendicitis.  awaiting MD rodriguez, v/s as noted.  will monitor

## 2021-05-11 NOTE — ED PROVIDER NOTE - OBJECTIVE STATEMENT
45 year old female PMH HTN, CAD s/p stent x2, anxiety, GERD, presents to ED for abd pain. Patient states acute onset RLQ abd pain 3 days ago, intermittent in nature, worse when standing for work (pt works as ). Pain is non-radiating, crampy. Patient also notes a "swelling" of her RLQ. Went to PCP today and referred to ED to r/o appendicitis. She denies f/c, cp, sob, nausea, vomiting, diarrhea, constipation, or dysuria.

## 2021-05-11 NOTE — ED PROVIDER NOTE - PHYSICAL EXAMINATION
GENERAL: A&Ox3, non-toxic appearing, no acute distress  HEENT: NCAT, EOMI, oral mucosa moist, normal conjunctiva  RESP: CTAB, no respiratory distress, no wheezes/rhonchi/rales, speaking in full sentences  CV: RRR, no murmurs/rubs/gallops  ABDOMEN: soft, RLQ and suprapubic ttp, non-distended, no guarding, no rebound  MSK: no visible deformities  NEURO: no focal sensory or motor deficits, CN 2-12 grossly intact  SKIN: warm, normal color, well perfused, no rash  PSYCH: normal affect

## 2021-05-11 NOTE — ED PROVIDER NOTE - ATTENDING CONTRIBUTION TO CARE
I performed a history and physical exam of the patient and discussed their management with the resident. I reviewed the resident's note and agree with the documented findings and plan of care. I have edited as appropriate. My medical decision making and observations are found above.  45 year old female PMH HTN, CAD s/p stent x2, anxiety, GERD, presents to ED for abd pain. Patient states acute onset RLQ abd pain 3 days ago, intermittent in nature, worse when standing for work (pt works as ). Pain is non-radiating, crampy. Patient also notes a "swelling" of her RLQ. Went to PCP today and referred to ED to r/o appendicitis. She denies f/c, cp, sob, nausea, vomiting, diarrhea, constipation, or dysuria.

## 2021-05-11 NOTE — CONSULT NOTE ADULT - ASSESSMENT
45y  w/ PSH of [facility] NICHOLE, cervical myomectomy for hx of fibroids and open LSO for L ovarian dermoid presenting with RLQ pain x3 days, found to have 7.2cm R ovarian cyst with blood flow demonstrated on US.  Patient with overall benign abdomen.  Differential for pain includes pain from large space occupying ovarian cyst vs. possible ovarian cyst leakage (although less likely given no free fluid on TVUS) vs. ovarian torsion or intermittent ovarian torsion. Low concern for torsion at this time given benign abdomen and clinical picture with overall well appearing well mentating patient.      Given size of cyst and potential for torsion, it was explained to patient that she would benefit from follow up with an OBGYN to discuss surgical planning for outpatient laprascopic ovarian cystectomy vs oophorectomy. Explained need for follow up with OBGYN so that surgery can be planned in a controlled fashion given increased cardiac risk and potential for intraabdominal adhesions.  Reviewed with patient reasons to return to ED including severe abdominal pain not responsive to pain medications or inability to tolerate PO intake.  Patient expressed understanding.  All questions/concerns addressed.

## 2021-05-11 NOTE — ED PROVIDER NOTE - NSFOLLOWUPINSTRUCTIONS_ED_ALL_ED_FT
You were seen in the emergency department for abdominal pain. Your CT scan did not demonstrate appendicitis. However, you have a large cyst connected to your ovary. Please follow up with OBGYN as above for removal.     Take Tylenol 650mg (Two 325 mg pills) every 4-6 hours as needed for pain.     Rest, drink plenty of fluids.  Advance activity as tolerated.  Continue all previously prescribed medications as directed.  Follow up with your primary care physician in 48-72 hours- bring copies of your results.  Return to the ER for worsening or persistent symptoms, and/or ANY NEW OR CONCERNING SYMPTOMS. If you have issues obtaining follow up, please call: [phone] to obtain a doctor or specialist who takes your insurance in your area.

## 2021-05-11 NOTE — ED PROVIDER NOTE - CLINICAL SUMMARY MEDICAL DECISION MAKING FREE TEXT BOX
Stef Bernal, PGY2: 45 year old female p/w RLQ pain x3 days, worse w/ standing. Seen by PMD and sent to r/o appendicitis. No f/c, n/v/d, or dysuria. Patient well appearing, VSS, afebrile, RLQ ttp w/o rebound. R/o ovarian torsion vs appy vs stone. Offered and declined pain meds. Plan for labs, UA, TVUS, CT A/P, reassess.

## 2021-05-11 NOTE — CONSULT NOTE ADULT - SUBJECTIVE AND OBJECTIVE BOX
*INCOMPLETE*    R2 GYN CONSULT NOTE    LENCHO MCCULLOUGH  45y  Female [id number]    HPI:        Primary OBGYN:    POB:    PGYN: Denies fibroids, cysts, endometriosis, STI's, Abnormal pap smears   PMH: No pertinent family history in first degree relatives    No pertinent past medical history    Hypertension    CAD (coronary artery disease)    Cyst of right ovary    Benign ovarian tumor, left    ABD PAIN    71    SysAdmin_VstLnk      PSH: PAST MEDICAL & SURGICAL HISTORY:  No pertinent past medical history    Hypertension    CAD (coronary artery disease)    Benign ovarian tumor, left  and fibroid - partial hysterectomy      MEDS:  ALL: Wellbutrin (Rash)    SOCHx: neg x 3  FH:   No h/o VTE. No h/o familial or gyn cancers (no breast, ovarian, uterine, gastric, pancreatic, skin, or eye. FAMILY HISTORY:  No pertinent family history in first degree relatives        ROS: neg except as noted in [facility]    Hospital Meds:   MEDICATIONS  (STANDING):    MEDICATIONS  (PRN):        Vital Signs Last 24 Hrs  T(C): 36.4 (11 May 2021 11:54), Max: 36.4 (11 May 2021 11:54)  T(F): 97.6 (11 May 2021 11:54), Max: 97.6 (11 May 2021 11:54)  HR: 56 (11 May 2021 15:47) (56 - 60)  BP: 164/103 (11 May 2021 15:47) (148/90 - 164/103)  BP(mean): --  RR: 16 (11 May 2021 15:47) (16 - 18)  SpO2: 100% (11 May 2021 15:47) (100% - 100%)    Physical Exam:   General: sitting comfortably in bed, in NAD   HEENT: neck supple, full ROM, moist mucuous membranes  CV: RRR, nl S1S2 no m/r/g  Lungs: CTA b/l, good air flow b/l   Back: No CVA tenderness  Abd: Soft, non-tender, non-distended.  Normoactive bowel sounds.  : No bleeding on pad. External labia wnl.    Speculum Exam: Scant blood in vault. No active bleeding from os.  Physiologic discharge.    Bimanual Exam: No cervical motion tenderness. Uterus wnl. Adnexae nonpalpable bilaterally. Cervix closed v Cervix dilated to XXX  Ext: non-tender b/l, no edema     LABS:      HCG Quantitative, Serum: <5.0 mIU/mL (21 @ 13:33)                                  13.5   9.81  )-----------( 252      ( 11 May 2021 13:33 )             42.8         139  |  104  |  10  ----------------------------<  107<H>  4.1   |  26  |  0.55    Ca    8.7      11 May 2021 13:33    TPro  6.8  /  Alb  4.2  /  TBili  0.6  /  DBili  x   /  AST  11  /  ALT  18  /  AlkPhos  65      I&O's Detail      Urinalysis Basic - ( 11 May 2021 13:03 )    Color: Light Yellow / Appearance: Clear / S.012 / pH: x  Gluc: x / Ketone: Negative  / Bili: Negative / Urobili: <2 mg/dL   Blood: x / Protein: Negative / Nitrite: Negative   Leuk Esterase: Negative / RBC: x / WBC x   Sq Epi: x / Non Sq Epi: x / Bacteria: x        RADIOLOGY & ADDITIONAL STUDIES:    EXAM:  US TRANSVAGINAL        PROCEDURE DATE:  May 11 2021         INTERPRETATION:  CLINICAL INFORMATION: Right lower quadrant pain.    LMP: Hysterectomy.    COMPARISON: None available.    TECHNIQUE:  Endovaginal and transabdominal pelvic sonogram. Color and Spectral Doppler was performed.    FINDINGS:    Uterus: Hysterectomy.    Right ovary: 8.3 x 7.7 x 8.3 cm. Cyst with low-level internal echoes and septa, measuring 6.3 x 7.2 x 6.5 cm. Normal arterial and venous waveforms.  Left ovary: Not visualized, consistent with left oophorectomy.    Fluid: None.    IMPRESSION:  Large right adnexal cyst, measuring 7.2 cm, with normal wave forms identified in the surrounding ovarian parenchyma. Please note the presence of flow does not exclude ovarian torsion, especially given the size of the right adnexal cyst, which could serve as a lead point.              MICHAEL RAMIREZ MD; Attending Radiologist  This document has been electronically signed. May 11 2021  2:03PM        EXAM:  CT ABDOMEN AND PELVIS IC        PROCEDURE DATE:  May 11 2021         INTERPRETATION:  CLINICAL INFORMATION: Right lower quadrant pain.    COMPARISON: CTA chest abdomen pelvis 12/3/2020: Pelvic ultrasound 2021.    CONTRAST/COMPLICATIONS:  IV Contrast: Omnipaque 350  90 cc administered   10 cc discarded  Oral Contrast: NONE  Complications: None reported at time of study completion    PROCEDURE:  CT of the Abdomen and Pelvis was performed.  Sagittal and coronal reformats were performed.    FINDINGS:  LOWER CHEST: There is a 4 mm calcified granuloma in the right lower lobe.    LIVER: Redemonstration of a 3.1 x 1.9 cm exophytic right hepatic cyst.  BILE DUCTS: Normal caliber.  GALLBLADDER: Within normal limits.  SPLEEN: Within normal limits.  PANCREAS: Within normal limits.  ADRENALS: Within normal limits.  KIDNEYS/URETERS: No renal stones or hydronephrosis.    BLADDER: Within normal limits.  REPRODUCTIVE ORGANS: Status post supracervical hysterectomy. Redemonstration of a 7.4 x 7.9 cm hypoattenuating right ovarian cystic lesion. There is a 2.3 cm lesion with a hyperdense rim adjacent to the right vaginal cuff, which may represent a corpus luteal cyst.    BOWEL: No bowel obstruction. Appendix is normal.  PERITONEUM: No ascites.  VESSELS: Within normal limits.  RETROPERITONEUM/LYMPH NODES: No lymphadenopathy.  ABDOMINAL WALL: Small fat-containing umbilical hernia.  BONES: Within normal limits.    IMPRESSION:  *  No evidence of appendicitis.  *  Large right ovarian cyst, as seen on pelvic ultrasound.  *  Additional right adnexal lesion with a hyperdense rim, which may represent a corpus luteal cyst.            CALDERON COSME MD; Resident Radiology  This document has been electronically signed.  DARIO BAINS MD; Attending Radiologist  This document has been electronically signed. May 11 2021  4:03PM     R2 GYN CONSULT NOTE    LENCHO MCCULLOUGH  45y  Female [id number]    HPI: 46yo  w/ PMH of CAD s/p stent x2 (2020), HTN, anxiety and hx of fibroids s/p lsc NICHOLE/cervical myomectomy (2009) and open LSO (2009) presenting with crampy RLQ pain x 3 days. Pain is intermittent and radiates across the low pelvis. Pain worse with standing and walking; improves with rest. Rated at 7/10 at its worst, 0/10 at best. She has not taken any pain medication at home or in ED for pain. She denies fevers, chills, nausea, vomiting, change in bowel habits, chest pain, SOB, vaginal bleeding, discharge, or dysuria.        Primary OBGYN: None    POB:  FT  x3 (, , )  SAB x 2  eTOP x1 s/p D&C  PGYN: hx of fibroids, s/p Lsc NICHOLE, cervical myomectomy (unable to perform TLH due to cervical fibroid placement) - 2009  Hx of 6cm left ovarian dermoid s/p open LSO via mini laparotomy - 2009  Denies endometriosis, STI's, Abnormal pap smears   PMH: HTN, CAD, Anxiety  PSH: As above  Cardiac stents placed - 2020  Meniscus surgery - approx. 3 yrs ago  MEDS: Cozaar, Nicotine, Pantoprazole, Carvedilol, Ticagrelor, ASA, Statin, Lorazepam  ALL: Wellbutrin (Rash)  SOCHx: Former smoker, quit 2020  Drinks alcohol 2-3 drinks 3x/week  Denies drug use  FH: HTN - mothers side      ROS: neg except as noted in HPI    Hospital Meds:   MEDICATIONS  (STANDING):    MEDICATIONS  (PRN):        Vital Signs Last 24 Hrs  T(C): 36.4 (11 May 2021 11:54), Max: 36.4 (11 May 2021 11:54)  T(F): 97.6 (11 May 2021 11:54), Max: 97.6 (11 May 2021 11:54)  HR: 56 (11 May 2021 15:47) (56 - 60)  BP: 164/103 (11 May 2021 15:47) (148/90 - 164/103)  BP(mean): --  RR: 16 (11 May 2021 15:47) (16 - 18)  SpO2: 100% (11 May 2021 15:47) (100% - 100%)    Physical Exam:   General: sitting comfortably in bed, in NAD   HEENT: neck supple, full ROM, moist mucuous membranes  Abd: Soft, non-tender, non-distended. No rebound, no guarding. Normoactive bowel sounds.  : Patient refused, states she will follow up with Gynecologist   Ext: non-tender b/l, no edema     LABS:      HCG Quantitative, Serum: <5.0 mIU/mL (21 @ 13:33)                                  13.5   9.81  )-----------( 252      ( 11 May 2021 13:33 )             42.8         139  |  104  |  10  ----------------------------<  107<H>  4.1   |  26  |  0.55    Ca    8.7      11 May 2021 13:33    TPro  6.8  /  Alb  4.2  /  TBili  0.6  /  DBili  x   /  AST  11  /  ALT  18  /  AlkPhos  65  11    I&O's Detail      Urinalysis Basic - ( 11 May 2021 13:03 )    Color: Light Yellow / Appearance: Clear / S.012 / pH: x  Gluc: x / Ketone: Negative  / Bili: Negative / Urobili: <2 mg/dL   Blood: x / Protein: Negative / Nitrite: Negative   Leuk Esterase: Negative / RBC: x / WBC x   Sq Epi: x / Non Sq Epi: x / Bacteria: x        RADIOLOGY & ADDITIONAL STUDIES:    EXAM:  US TRANSVAGINAL        PROCEDURE DATE:  May 11 2021         INTERPRETATION:  CLINICAL INFORMATION: Right lower quadrant pain.    LMP: Hysterectomy.    COMPARISON: None available.    TECHNIQUE:  Endovaginal and transabdominal pelvic sonogram. Color and Spectral Doppler was performed.    FINDINGS:    Uterus: Hysterectomy.    Right ovary: 8.3 x 7.7 x 8.3 cm. Cyst with low-level internal echoes and septa, measuring 6.3 x 7.2 x 6.5 cm. Normal arterial and venous waveforms.  Left ovary: Not visualized, consistent with left oophorectomy.    Fluid: None.    IMPRESSION:  Large right adnexal cyst, measuring 7.2 cm, with normal wave forms identified in the surrounding ovarian parenchyma. Please note the presence of flow does not exclude ovarian torsion, especially given the size of the right adnexal cyst, which could serve as a lead point.              MICHAEL RAMIREZ MD; Attending Radiologist  This document has been electronically signed. May 11 2021  2:03PM        EXAM:  CT ABDOMEN AND PELVIS IC        PROCEDURE DATE:  May 11 2021         INTERPRETATION:  CLINICAL INFORMATION: Right lower quadrant pain.    COMPARISON: CTA chest abdomen pelvis 12/3/2020: Pelvic ultrasound 2021.    CONTRAST/COMPLICATIONS:  IV Contrast: Omnipaque 350  90 cc administered   10 cc discarded  Oral Contrast: NONE  Complications: None reported at time of study completion    PROCEDURE:  CT of the Abdomen and Pelvis was performed.  Sagittal and coronal reformats were performed.    FINDINGS:  LOWER CHEST: There is a 4 mm calcified granuloma in the right lower lobe.    LIVER: Redemonstration of a 3.1 x 1.9 cm exophytic right hepatic cyst.  BILE DUCTS: Normal caliber.  GALLBLADDER: Within normal limits.  SPLEEN: Within normal limits.  PANCREAS: Within normal limits.  ADRENALS: Within normal limits.  KIDNEYS/URETERS: No renal stones or hydronephrosis.    BLADDER: Within normal limits.  REPRODUCTIVE ORGANS: Status post supracervical hysterectomy. Redemonstration of a 7.4 x 7.9 cm hypoattenuating right ovarian cystic lesion. There is a 2.3 cm lesion with a hyperdense rim adjacent to the right vaginal cuff, which may represent a corpus luteal cyst.    BOWEL: No bowel obstruction. Appendix is normal.  PERITONEUM: No ascites.  VESSELS: Within normal limits.  RETROPERITONEUM/LYMPH NODES: No lymphadenopathy.  ABDOMINAL WALL: Small fat-containing umbilical hernia.  BONES: Within normal limits.    IMPRESSION:  *  No evidence of appendicitis.  *  Large right ovarian cyst, as seen on pelvic ultrasound.  *  Additional right adnexal lesion with a hyperdense rim, which may represent a corpus luteal cyst.            CALDERON COSME MD; Resident Radiology  This document has been electronically signed.  DARIO BAINS MD; Attending Radiologist  This document has been electronically signed. May 11 2021  4:03PM

## 2021-05-11 NOTE — CONSULT NOTE ADULT - PROBLEM SELECTOR RECOMMENDATION 9
-No acute GYN intervention at this time  -Patient encouraged to take Tylenol 975mg q6h for pain; should follow up with cardiologist if she can take NSAIDs  -Patient to follow up with GYN for outpatient surgical planning and medical clearance  -Cleared for d/c from GYN perspective    D/w Dr. Bell RFrankel PGY2

## 2021-05-11 NOTE — ED PROVIDER NOTE - PATIENT PORTAL LINK FT
You can access the FollowMyHealth Patient Portal offered by [facility] by registering at the following website: [email or web address]. By joining [facility]’s FollowMyHealth portal, you will also be able to view your health information using other applications (apps) compatible with our system.

## 2021-05-11 NOTE — ED PROVIDER NOTE - CARE PROVIDER_API CALL
Karl Salas)  [facility] Other  [address] [place] [address]  Phone: [phone]  Fax: [phone]  Follow Up Time: Routine

## 2021-05-12 LAB
CULTURE RESULTS: SIGNIFICANT CHANGE UP
SPECIMEN SOURCE: SIGNIFICANT CHANGE UP

## 2021-05-17 ENCOUNTER — APPOINTMENT (OUTPATIENT)
Dept: OBGYN | Facility: CLINIC | Age: 46
End: 2021-05-17

## 2021-05-17 VITALS
WEIGHT: 216 LBS | DIASTOLIC BLOOD PRESSURE: 90 MMHG | BODY MASS INDEX: 33.9 KG/M2 | HEIGHT: 67 IN | SYSTOLIC BLOOD PRESSURE: 140 MMHG

## 2021-05-18 ENCOUNTER — APPOINTMENT (OUTPATIENT)
Dept: OBGYN | Facility: CLINIC | Age: 46
End: 2021-05-18

## 2021-05-18 ENCOUNTER — APPOINTMENT (OUTPATIENT)
Dept: OBGYN | Facility: CLINIC | Age: 46
End: 2021-05-18
Payer: COMMERCIAL

## 2021-05-18 VITALS
SYSTOLIC BLOOD PRESSURE: 124 MMHG | DIASTOLIC BLOOD PRESSURE: 90 MMHG | WEIGHT: 216 LBS | BODY MASS INDEX: 33.9 KG/M2 | HEIGHT: 67 IN

## 2021-05-18 DIAGNOSIS — O02.1 MISSED ABORTION: ICD-10-CM

## 2021-05-18 DIAGNOSIS — Z87.42 PERSONAL HISTORY OF OTHER DISEASES OF THE FEMALE GENITAL TRACT: ICD-10-CM

## 2021-05-18 DIAGNOSIS — U07.1 COVID-19: ICD-10-CM

## 2021-05-18 DIAGNOSIS — Z78.9 OTHER SPECIFIED HEALTH STATUS: ICD-10-CM

## 2021-05-18 PROCEDURE — 99386 PREV VISIT NEW AGE 40-64: CPT

## 2021-05-20 LAB — HPV HIGH+LOW RISK DNA PNL CVX: NOT DETECTED

## 2021-05-22 PROBLEM — Z87.42 HISTORY OF TERMINATION OF PREGNANCY: Status: RESOLVED | Noted: 2021-05-22 | Resolved: 2021-05-22

## 2021-05-22 PROBLEM — U07.1 COVID-19 VIRUS INFECTION: Status: RESOLVED | Noted: 2021-05-22 | Resolved: 2021-05-22

## 2021-05-22 PROBLEM — O02.1 ABORTION, MISSED: Status: RESOLVED | Noted: 2021-05-22 | Resolved: 2021-05-22

## 2021-05-22 PROBLEM — Z78.9 DOES NOT USE ILLICIT DRUGS: Status: ACTIVE | Noted: 2021-05-22

## 2021-05-22 LAB — CYTOLOGY CVX/VAG DOC THIN PREP: ABNORMAL

## 2021-05-22 NOTE — PLAN
[FreeTextEntry1] : Mammo/sono yearly\par \par MRI pelvis\par Discussed ovarian cyst and mri. Discussed issues with surgery and blood thinner as well as recent cardiac hx.

## 2021-05-22 NOTE — HISTORY OF PRESENT ILLNESS
[Frequency: Q ___ days] : menstrual periods occur approximately every [unfilled] days [FreeTextEntry1] : 46 yo  lmp  (jona hysterectomy) vd x 3 all boys.  2020 MI, 2021 COovid pos, 5/10 pelvic pain,  seen in er tvus and ct showing large ovarian cyst.  Former smoker, regular drinker.   [Menarche Age: ____] : age at menarche was [unfilled] [Men] : men

## 2021-05-22 NOTE — REVIEW OF SYSTEMS
[Fatigue] : fatigue [SOB on Exertion] : shortness of breath on exertion [Abdominal Pain] : abdominal pain [Bloating] : bloating [Anxiety] : anxiety [Depression] : depression [Feeling Weak] : feeling weak [Easy Bruising] : easy bruising [Negative] : Heme/Lymph

## 2021-06-05 ENCOUNTER — OUTPATIENT (OUTPATIENT)
Dept: OUTPATIENT SERVICES | Facility: HOSPITAL | Age: 46
LOS: 1 days | End: 2021-06-05
Payer: COMMERCIAL

## 2021-06-05 ENCOUNTER — APPOINTMENT (OUTPATIENT)
Dept: MRI IMAGING | Facility: CLINIC | Age: 46
End: 2021-06-05
Payer: COMMERCIAL

## 2021-06-05 ENCOUNTER — RESULT REVIEW (OUTPATIENT)
Age: 46
End: 2021-06-05

## 2021-06-05 DIAGNOSIS — Z00.8 ENCOUNTER FOR OTHER GENERAL EXAMINATION: ICD-10-CM

## 2021-06-05 DIAGNOSIS — D27.1 BENIGN NEOPLASM OF LEFT OVARY: Chronic | ICD-10-CM

## 2021-06-05 PROCEDURE — 72197 MRI PELVIS W/O & W/DYE: CPT

## 2021-06-05 PROCEDURE — 72197 MRI PELVIS W/O & W/DYE: CPT | Mod: 26

## 2021-06-05 PROCEDURE — A9585: CPT

## 2021-06-08 ENCOUNTER — NON-APPOINTMENT (OUTPATIENT)
Age: 46
End: 2021-06-08

## 2021-06-08 ENCOUNTER — APPOINTMENT (OUTPATIENT)
Dept: INTERNAL MEDICINE | Facility: CLINIC | Age: 46
End: 2021-06-08
Payer: COMMERCIAL

## 2021-06-08 VITALS
WEIGHT: 209 LBS | SYSTOLIC BLOOD PRESSURE: 150 MMHG | HEART RATE: 68 BPM | RESPIRATION RATE: 16 BRPM | OXYGEN SATURATION: 98 % | DIASTOLIC BLOOD PRESSURE: 99 MMHG | BODY MASS INDEX: 32.8 KG/M2 | TEMPERATURE: 98 F | HEIGHT: 67 IN

## 2021-06-08 VITALS — DIASTOLIC BLOOD PRESSURE: 90 MMHG | SYSTOLIC BLOOD PRESSURE: 140 MMHG

## 2021-06-08 DIAGNOSIS — Z82.49 FAMILY HISTORY OF ISCHEMIC HEART DISEASE AND OTHER DISEASES OF THE CIRCULATORY SYSTEM: ICD-10-CM

## 2021-06-08 PROCEDURE — 99204 OFFICE O/P NEW MOD 45 MIN: CPT | Mod: 25

## 2021-06-08 PROCEDURE — 99406 BEHAV CHNG SMOKING 3-10 MIN: CPT

## 2021-06-08 RX ORDER — GLUCOSAMINE/MSM/CHONDROIT SULF 500-166.6
10 TABLET ORAL
Refills: 0 | Status: ACTIVE | COMMUNITY
Start: 2021-06-08

## 2021-06-08 RX ORDER — MULTIVITAMIN
CAPSULE ORAL
Refills: 0 | Status: ACTIVE | COMMUNITY
Start: 2021-06-08

## 2021-06-08 NOTE — HEALTH RISK ASSESSMENT
[] : Yes [Yes] : Yes [2 - 3 times a week (3 pts)] : 2 - 3  times a week (3 points) [3 or 4 (1 pt)] : 3 or 4  (1 point) [Never (0 pts)] : Never (0 points) [No] : In the past 12 months have you used drugs other than those required for medical reasons? No [No falls in past year] : Patient reported no falls in the past year [(PHQ-2) Unable to screen] : PHQ-2: unable to screen [None] : None [With Family] : lives with family [Employed] : employed [Feels Safe at Home] : Feels safe at home [Fully functional (bathing, dressing, toileting, transferring, walking, feeding)] : Fully functional (bathing, dressing, toileting, transferring, walking, feeding) [Fully functional (using the telephone, shopping, preparing meals, housekeeping, doing laundry, using] : Fully functional and needs no help or supervision to perform IADLs (using the telephone, shopping, preparing meals, housekeeping, doing laundry, using transportation, managing medications and managing finances) [Audit-CScore] : 4 [UnableToScreenReason] : hx anx and dep, establishing with psych [Change in mental status noted] : No change in mental status noted [Reports changes in hearing] : Reports no changes in hearing [Reports changes in vision] : Reports no changes in vision [Reports changes in dental health] : Reports no changes in dental health

## 2021-06-08 NOTE — PHYSICAL EXAM
[No Acute Distress] : no acute distress [Well Nourished] : well nourished [Well Developed] : well developed [Well-Appearing] : well-appearing [Normal Sclera/Conjunctiva] : normal sclera/conjunctiva [PERRL] : pupils equal round and reactive to light [EOMI] : extraocular movements intact [Normal Outer Ear/Nose] : the outer ears and nose were normal in appearance [Normal Oropharynx] : the oropharynx was normal [No JVD] : no jugular venous distention [No Lymphadenopathy] : no lymphadenopathy [Supple] : supple [Thyroid Normal, No Nodules] : the thyroid was normal and there were no nodules present [No Respiratory Distress] : no respiratory distress  [No Accessory Muscle Use] : no accessory muscle use [Clear to Auscultation] : lungs were clear to auscultation bilaterally [Normal Rate] : normal rate  [Regular Rhythm] : with a regular rhythm [Normal S1, S2] : normal S1 and S2 [No Murmur] : no murmur heard [No Carotid Bruits] : no carotid bruits [No Abdominal Bruit] : a ~M bruit was not heard ~T in the abdomen [Pedal Pulses Present] : the pedal pulses are present [No Edema] : there was no peripheral edema [No Palpable Aorta] : no palpable aorta [Soft] : abdomen soft [Non Tender] : non-tender [Non-distended] : non-distended [No Masses] : no abdominal mass palpated [No HSM] : no HSM [Normal Bowel Sounds] : normal bowel sounds [Normal Posterior Cervical Nodes] : no posterior cervical lymphadenopathy [Normal Anterior Cervical Nodes] : no anterior cervical lymphadenopathy [No CVA Tenderness] : no CVA  tenderness [No Spinal Tenderness] : no spinal tenderness [No Joint Swelling] : no joint swelling [Grossly Normal Strength/Tone] : grossly normal strength/tone [No Rash] : no rash [Coordination Grossly Intact] : coordination grossly intact [No Focal Deficits] : no focal deficits [Normal Gait] : normal gait [Speech Grossly Normal] : speech grossly normal [Memory Grossly Normal] : memory grossly normal [Normal Affect] : the affect was normal [Alert and Oriented x3] : oriented to person, place, and time [Normal Mood] : the mood was normal [Normal Insight/Judgement] : insight and judgment were intact [de-identified] : obese

## 2021-06-08 NOTE — REVIEW OF SYSTEMS
[Insomnia] : insomnia [Anxiety] : anxiety [Depression] : depression [Negative] : Heme/Lymph [Suicidal] : not suicidal [FreeTextEntry9] : tension in the back

## 2021-06-08 NOTE — HISTORY OF PRESENT ILLNESS
[FreeTextEntry1] : establish care [de-identified] : 45 year old woman s/p admission to [facility] with AWMI ans SP PCI x 2 to LAD with resultant moderate to severe LV dysfunction. Also with hx HTn, HLD, ovarian cyst, following with Dr Anderson, anx with depression, insomnia.  \par she has changed cardiology to local - Dr Guevara.\par Has established with cardiac rehab.\par She is an active smoker. \par Has been walking regularly.  Has been following diet.\par Taking meds w/o issues.  Reports hx cough with med - ? ACE.\par Taking lasix as needed.  ` 1 time a week.  \par currently w/o c/o.  no cv sx.  \par no GI sx.  \par no noted neuro sx.  \par Taking melatonin as needed\par \par vaccines - reported as up to date\par \par Mammo - in the past year.\par gyn- Dr Anderson\par ophtho - due

## 2021-06-08 NOTE — COUNSELING
[Risk of tobacco use and health benefits of smoking cessation discussed] : Risk of tobacco use and health benefits of smoking cessation discussed [Cessation strategies including cessation program discussed] : Cessation strategies including cessation program discussed [Use of nicotine replacement therapies and other medications discussed] : Use of nicotine replacement therapies and other medications discussed [Encouraged to pick a quit date and identify support needed to quit] : Encouraged to pick a quit date and identify support needed to quit [Willing to Quit Smoking] : Willing to quit smoking [Tobacco Use Cessation Intermediate Greater Than 3 Minutes Up to 10 Minutes] : Tobacco Use Cessation Intermediate Greater Than 3 Minutes Up to 10 Minutes [Potential consequences of obesity discussed] : Potential consequences of obesity discussed [Benefits of weight loss discussed] : Benefits of weight loss discussed [Encouraged to increase physical activity] : Encouraged to increase physical activity [Decrease Portions] : decrease portions [None] : None [Good understanding] : Patient has a good understanding of lifestyle changes and steps needed to achieve self management goal [FreeTextEntry1] : 4

## 2021-06-30 PROBLEM — Z87.42 HISTORY OF OVARIAN CYST: Status: RESOLVED | Noted: 2021-05-22 | Resolved: 2021-06-30

## 2021-07-01 ENCOUNTER — APPOINTMENT (OUTPATIENT)
Dept: GYNECOLOGIC ONCOLOGY | Facility: CLINIC | Age: 46
End: 2021-07-01
Payer: COMMERCIAL

## 2021-07-01 VITALS
HEART RATE: 60 BPM | BODY MASS INDEX: 32.8 KG/M2 | HEIGHT: 67 IN | DIASTOLIC BLOOD PRESSURE: 89 MMHG | WEIGHT: 209 LBS | SYSTOLIC BLOOD PRESSURE: 150 MMHG

## 2021-07-01 DIAGNOSIS — Z87.42 PERSONAL HISTORY OF OTHER DISEASES OF THE FEMALE GENITAL TRACT: ICD-10-CM

## 2021-07-01 PROCEDURE — 99204 OFFICE O/P NEW MOD 45 MIN: CPT

## 2021-07-02 LAB
CANCER AG125 SERPL-ACNC: 15 U/ML
CANCER AG19-9 SERPL-ACNC: 10 U/ML
CEA SERPL-MCNC: 6.5 NG/ML

## 2021-07-07 ENCOUNTER — NON-APPOINTMENT (OUTPATIENT)
Age: 46
End: 2021-07-07

## 2021-07-07 ENCOUNTER — APPOINTMENT (OUTPATIENT)
Dept: INTERNAL MEDICINE | Facility: CLINIC | Age: 46
End: 2021-07-07
Payer: COMMERCIAL

## 2021-07-07 VITALS
HEIGHT: 67 IN | DIASTOLIC BLOOD PRESSURE: 80 MMHG | RESPIRATION RATE: 16 BRPM | HEART RATE: 63 BPM | OXYGEN SATURATION: 98 % | WEIGHT: 218 LBS | BODY MASS INDEX: 34.21 KG/M2 | SYSTOLIC BLOOD PRESSURE: 140 MMHG

## 2021-07-07 PROCEDURE — 93000 ELECTROCARDIOGRAM COMPLETE: CPT

## 2021-07-07 PROCEDURE — 99214 OFFICE O/P EST MOD 30 MIN: CPT | Mod: 25

## 2021-07-07 PROCEDURE — 99406 BEHAV CHNG SMOKING 3-10 MIN: CPT

## 2021-07-07 NOTE — COUNSELING
[Weight management counseling provided] : Weight management [Healthy eating counseling provided] : healthy eating [Activity counseling provided] : activity [Good understanding] : Patient has a good understanding of disease, goals and obesity follow-up plan [Decrease Portions] : Decrease food portions [None] : None [Discussed Risks and Advised to Quit Smoking] : Discussed risks and advised to quit smoking [Discussed Cessation Medication] : cessation medication was discussed [Discussed Cessation Strategies] : cessation strategies were discussed [Quit Smoking] : Quit Smoking [Tobacco Use Cessation Intermediate Greater Than 3 Minutes Up to 10 Minutes] : Tobacco Use Cessation Intermediate Greater Than 3 Minutes Up to 10 Minutes

## 2021-07-07 NOTE — HISTORY OF PRESENT ILLNESS
[Coronary Artery Disease] : coronary artery disease [No Pertinent Pulmonary History] : no history of asthma, COPD, sleep apnea, or smoking [No Adverse Anesthesia Reaction] : no adverse anesthesia reaction in self or family member [(Patient denies any chest pain, claudication, dyspnea on exertion, orthopnea, palpitations or syncope)] : Patient denies any chest pain, claudication, dyspnea on exertion, orthopnea, palpitations or syncope [Moderate (4-6 METs)] : Moderate (4-6 METs) [Anti-Platelet Agents: _____] : Anti-Platelet Agents: [unfilled] [Aortic Stenosis] : no aortic stenosis [Atrial Fibrillation] : no atrial fibrillation [Recent Myocardial Infarction] : no recent myocardial infarction [Implantable Device/Pacemaker] : no implantable device/pacemaker [Chronic Anticoagulation] : no chronic anticoagulation [Chronic Kidney Disease] : no chronic kidney disease [Diabetes] : no diabetes [FreeTextEntry1] : Robotic Rt RSO [FreeTextEntry2] : TBD [FreeTextEntry3] : Dr Murray [FreeTextEntry4] : 45 year old woman s/p admission to [facility] with AWMI ans SP PCI x 2 to LAD with resultant moderate to severe LV dysfunction. Also with hx HTn, HLD, ovarian cyst, following with Dr Anderson, anx with depression, insomnia. \par following with local cardiology - Dr Guevara.\par Has been following with cardiac rehab.\par She is an active smoker - was cutting down, trying to quit.  Will be using the patch.\par Has been walking regularly. Has been following diet.\par Taking meds w/o issues. \par Taking lasix as needed. only occasionally. \par currently w/o c/o. no cv sx. Has been exercising at cardiac rehab - treadmill, bike, hand bike and rower.  Has been using light weights. Has been going for walks - 5 miles.  \par no GI sx. \par no noted neuro sx. \par Taking melatonin as needed.\par Reports that she has been having anxiety - has been increased by the upcoming surg.  Denies depression.  Asking for temp supply of lorazepam.  \par \par \par vaccines - reported as up to date\par \par Mammo - in the past year.\par gyn- Dr Anderson, changed to Dr De Leon\par ophtho - due  [FreeTextEntry7] : PCI x 2 to LAD with resultant moderate to severe LV dysfunction

## 2021-07-07 NOTE — ASSESSMENT
[Patient Optimized for Surgery] : Patient optimized for surgery [Other: _____] : [unfilled] [As per surgery] : as per surgery [FreeTextEntry4] : 45 year old woman s/p admission to [facility] with AWMI ans SP PCI x 2 to LAD with resultant moderate to severe LV dysfunction. Also with hx HTn, HLD, ovarian cyst, anx with depression, insomnia. \par following with local cardiology - Dr Guevara.  Last seen last mo and she has f/u appt tomorrow.\par Has been following with cardiac rehab.  doing well with exercise - increasing her amount.\par She is an active smoker - was cutting down, trying to quit.  Will be using the patch.\par currently w/o c/o. no cv sx. Has been exercising at cardiac rehab - treadmill, bike, hand bike and rower.  Has been using light weights. Has been going for walks - 5 miles.  \par As she has been stable on current rx, has been following closely with cardiology and tolerates adequate METs, there should be no contraindications for the planned procedure.  She will be seeing cardiology tomorrow for final cardiac clearance.  \par she has discussed with cardiology med modifications in anticipation of the planned procedure

## 2021-07-07 NOTE — CONSULT LETTER
[Dear  ___] : Dear  [unfilled], [Consult Letter:] : I had the pleasure of evaluating your patient, [unfilled]. [Please see my note below.] : Please see my note below. [Sincerely,] : Sincerely, [FreeTextEntry3] : Misael Martinez MD

## 2021-07-07 NOTE — PHYSICAL EXAM
[No Acute Distress] : no acute distress [Well Nourished] : well nourished [Well Developed] : well developed [Well-Appearing] : well-appearing [Normal Sclera/Conjunctiva] : normal sclera/conjunctiva [PERRL] : pupils equal round and reactive to light [EOMI] : extraocular movements intact [Normal Outer Ear/Nose] : the outer ears and nose were normal in appearance [Normal Oropharynx] : the oropharynx was normal [No JVD] : no jugular venous distention [No Lymphadenopathy] : no lymphadenopathy [Supple] : supple [Thyroid Normal, No Nodules] : the thyroid was normal and there were no nodules present [No Respiratory Distress] : no respiratory distress  [No Accessory Muscle Use] : no accessory muscle use [Clear to Auscultation] : lungs were clear to auscultation bilaterally [Normal Rate] : normal rate  [Regular Rhythm] : with a regular rhythm [Normal S1, S2] : normal S1 and S2 [No Murmur] : no murmur heard [No Carotid Bruits] : no carotid bruits [No Abdominal Bruit] : a ~M bruit was not heard ~T in the abdomen [Pedal Pulses Present] : the pedal pulses are present [No Edema] : there was no peripheral edema [No Palpable Aorta] : no palpable aorta [Soft] : abdomen soft [Non Tender] : non-tender [Non-distended] : non-distended [No Masses] : no abdominal mass palpated [No HSM] : no HSM [Normal Bowel Sounds] : normal bowel sounds [Normal Posterior Cervical Nodes] : no posterior cervical lymphadenopathy [Normal Anterior Cervical Nodes] : no anterior cervical lymphadenopathy [No CVA Tenderness] : no CVA  tenderness [No Spinal Tenderness] : no spinal tenderness [No Joint Swelling] : no joint swelling [Grossly Normal Strength/Tone] : grossly normal strength/tone [No Rash] : no rash [Coordination Grossly Intact] : coordination grossly intact [No Focal Deficits] : no focal deficits [Normal Gait] : normal gait [Speech Grossly Normal] : speech grossly normal [Memory Grossly Normal] : memory grossly normal [Normal Affect] : the affect was normal [Alert and Oriented x3] : oriented to person, place, and time [Normal Mood] : the mood was normal [Normal Insight/Judgement] : insight and judgment were intact [de-identified] : obese

## 2021-07-07 NOTE — RESULTS/DATA
[] : results reviewed [de-identified] : Sinus@56, flipped t waves and suboptimal rwp in v1-v2.  No sig change from old EKG.

## 2021-07-12 ENCOUNTER — NON-APPOINTMENT (OUTPATIENT)
Age: 46
End: 2021-07-12

## 2021-07-15 ENCOUNTER — NON-APPOINTMENT (OUTPATIENT)
Age: 46
End: 2021-07-15

## 2021-07-28 ENCOUNTER — RX CHANGE (OUTPATIENT)
Age: 46
End: 2021-07-28

## 2021-08-03 ENCOUNTER — OUTPATIENT (OUTPATIENT)
Dept: OUTPATIENT SERVICES | Facility: HOSPITAL | Age: 46
LOS: 1 days | End: 2021-08-03

## 2021-08-03 VITALS
TEMPERATURE: 97 F | SYSTOLIC BLOOD PRESSURE: 130 MMHG | RESPIRATION RATE: 16 BRPM | HEART RATE: 60 BPM | OXYGEN SATURATION: 99 % | HEIGHT: 67 IN | WEIGHT: 216.93 LBS | DIASTOLIC BLOOD PRESSURE: 80 MMHG

## 2021-08-03 DIAGNOSIS — N83.209 UNSPECIFIED OVARIAN CYST, UNSPECIFIED SIDE: ICD-10-CM

## 2021-08-03 DIAGNOSIS — I25.10 ATHEROSCLEROTIC HEART DISEASE OF NATIVE CORONARY ARTERY WITHOUT ANGINA PECTORIS: ICD-10-CM

## 2021-08-03 DIAGNOSIS — Z98.890 OTHER SPECIFIED POSTPROCEDURAL STATES: Chronic | ICD-10-CM

## 2021-08-03 DIAGNOSIS — D27.1 BENIGN NEOPLASM OF LEFT OVARY: Chronic | ICD-10-CM

## 2021-08-03 LAB
ANION GAP SERPL CALC-SCNC: 13 MMOL/L — SIGNIFICANT CHANGE UP (ref 7–14)
APPEARANCE UR: CLEAR — SIGNIFICANT CHANGE UP
BILIRUB UR-MCNC: NEGATIVE — SIGNIFICANT CHANGE UP
BLD GP AB SCN SERPL QL: NEGATIVE — SIGNIFICANT CHANGE UP
BUN SERPL-MCNC: 16 MG/DL — SIGNIFICANT CHANGE UP (ref 7–23)
CALCIUM SERPL-MCNC: 9.1 MG/DL — SIGNIFICANT CHANGE UP (ref 8.4–10.5)
CHLORIDE SERPL-SCNC: 104 MMOL/L — SIGNIFICANT CHANGE UP (ref 98–107)
CO2 SERPL-SCNC: 23 MMOL/L — SIGNIFICANT CHANGE UP (ref 22–31)
COLOR SPEC: SIGNIFICANT CHANGE UP
CREAT SERPL-MCNC: 0.55 MG/DL — SIGNIFICANT CHANGE UP (ref 0.5–1.3)
DIFF PNL FLD: NEGATIVE — SIGNIFICANT CHANGE UP
GLUCOSE SERPL-MCNC: 83 MG/DL — SIGNIFICANT CHANGE UP (ref 70–99)
GLUCOSE UR QL: NEGATIVE — SIGNIFICANT CHANGE UP
HCG UR QL: NEGATIVE — SIGNIFICANT CHANGE UP
HCT VFR BLD CALC: 40.7 % — SIGNIFICANT CHANGE UP (ref 34.5–45)
HGB BLD-MCNC: 12.7 G/DL — SIGNIFICANT CHANGE UP (ref 11.5–15.5)
KETONES UR-MCNC: NEGATIVE — SIGNIFICANT CHANGE UP
LEUKOCYTE ESTERASE UR-ACNC: NEGATIVE — SIGNIFICANT CHANGE UP
MCHC RBC-ENTMCNC: 27.9 PG — SIGNIFICANT CHANGE UP (ref 27–34)
MCHC RBC-ENTMCNC: 31.2 GM/DL — LOW (ref 32–36)
MCV RBC AUTO: 89.5 FL — SIGNIFICANT CHANGE UP (ref 80–100)
NITRITE UR-MCNC: NEGATIVE — SIGNIFICANT CHANGE UP
NRBC # BLD: 0 /100 WBCS — SIGNIFICANT CHANGE UP
NRBC # FLD: 0 K/UL — SIGNIFICANT CHANGE UP
PH UR: 7.5 — SIGNIFICANT CHANGE UP (ref 5–8)
PLATELET # BLD AUTO: 248 K/UL — SIGNIFICANT CHANGE UP (ref 150–400)
POTASSIUM SERPL-MCNC: 3.9 MMOL/L — SIGNIFICANT CHANGE UP (ref 3.5–5.3)
POTASSIUM SERPL-SCNC: 3.9 MMOL/L — SIGNIFICANT CHANGE UP (ref 3.5–5.3)
PROT UR-MCNC: NEGATIVE — SIGNIFICANT CHANGE UP
RBC # BLD: 4.55 M/UL — SIGNIFICANT CHANGE UP (ref 3.8–5.2)
RBC # FLD: 14.4 % — SIGNIFICANT CHANGE UP (ref 10.3–14.5)
RH IG SCN BLD-IMP: POSITIVE — SIGNIFICANT CHANGE UP
SODIUM SERPL-SCNC: 140 MMOL/L — SIGNIFICANT CHANGE UP (ref 135–145)
SP GR SPEC: 1.01 — LOW (ref 1.01–1.02)
UROBILINOGEN FLD QL: SIGNIFICANT CHANGE UP
WBC # BLD: 7.47 K/UL — SIGNIFICANT CHANGE UP (ref 3.8–10.5)
WBC # FLD AUTO: 7.47 K/UL — SIGNIFICANT CHANGE UP (ref 3.8–10.5)

## 2021-08-03 RX ORDER — MULTIVIT-MIN/FERROUS GLUCONATE 9 MG/15 ML
1 LIQUID (ML) ORAL
Qty: 0 | Refills: 0 | DISCHARGE

## 2021-08-03 RX ORDER — SODIUM CHLORIDE 9 MG/ML
1000 INJECTION, SOLUTION INTRAVENOUS
Refills: 0 | Status: DISCONTINUED | OUTPATIENT
Start: 2021-08-20 | End: 2021-09-03

## 2021-08-03 NOTE — H&P PST ADULT - NSICDXPASTMEDICALHX_GEN_ALL_CORE_FT
PAST MEDICAL HISTORY:  Anxiety and depression     CAD (coronary artery disease)     COVID-19     GERD (gastroesophageal reflux disease)     Hyperlipidemia     Hypertension

## 2021-08-03 NOTE — H&P PST ADULT - NSICDXPASTSURGICALHX_GEN_ALL_CORE_FT
PAST SURGICAL HISTORY:  Benign ovarian tumor, left and fibroid - partial hysterectomy    H/O meniscectomy of right knee     S/P cardiac cath 2 stents 12/2020

## 2021-08-03 NOTE — H&P PST ADULT - HISTORY OF PRESENT ILLNESS
46 year old female with PMH of HTN, HDL, MI,  CAD (PCI x 2 LAD- 12/2020) Covid in 2/2021,  presents to Presurgical testing with diagnosis of unspecified ovarian cyst scheduled for robotic assisted right salpingo oophorectomy, possible staging , exploratory laparotomy , cystoscopy. Patient with c/o  right lower quadrant pain in May of 2021, US revealed right ovarian cysts. No reoccurrence of pain   46 year old female with PMH of HTN, HDL, MI,  CAD (PCI x 2 LAD- 12/2020) Covid in 2/2021,  presents to Presurgical testing with diagnosis of unspecified ovarian cyst scheduled for robotic assisted right salpingo oophorectomy, possible staging , exploratory laparotomy , cystoscopy. Patient with c/o  right lower quadrant pain in May of 2021, US revealed right ovarian cysts. No reccurrence of pain . Prior supracervical hysterectomy/LSO.

## 2021-08-03 NOTE — H&P PST ADULT - NSANTHOSAYNRD_GEN_A_CORE
No. RADAMES screening performed.  STOP BANG Legend: 0-2 = LOW Risk; 3-4 = INTERMEDIATE Risk; 5-8 = HIGH Risk

## 2021-08-03 NOTE — H&P PST ADULT - NSICDXPROBLEM_GEN_ALL_CORE_FT
PROBLEM DIAGNOSES  Problem: Unspecified ovarian cyst, unspecified side  Assessment and Plan: Patient tentatively scheduled for robotic assisted right salpingo oophorectomy, possible staging , exploratory laparotomy , cystoscopy on 8/20/21.  Pre-op instructions provided. Pt given verbal and written instructions with teach back on chlorhexidine shampoo and pepcid. Pt verbalized understanding with return demonstration.   Patient instructed to call surgeon's office to schedule a COVID-19 test  prior to procedure.     Problem: CAD (coronary artery disease)  Assessment and Plan: Patient with h/o Cardiac stents x 2 in 12/2020.  Pending Cardiac eval as per surgeon request-copy requested.   Last stress results requested  Patient reports that she was advised by her cardiologist to stop Brilinta 5 days proir to surgery. Last dose 8/14/21. Patient continues on low dose aspirin.   Patient instructed to take losartan with a sip of water on the morning of procedure.

## 2021-08-05 LAB
-  AMIKACIN: SIGNIFICANT CHANGE UP
-  AZTREONAM: SIGNIFICANT CHANGE UP
-  CEFEPIME: SIGNIFICANT CHANGE UP
-  CEFTAZIDIME: SIGNIFICANT CHANGE UP
-  CIPROFLOXACIN: SIGNIFICANT CHANGE UP
-  GENTAMICIN: SIGNIFICANT CHANGE UP
-  IMIPENEM: SIGNIFICANT CHANGE UP
-  LEVOFLOXACIN: SIGNIFICANT CHANGE UP
-  MEROPENEM: SIGNIFICANT CHANGE UP
-  PIPERACILLIN/TAZOBACTAM: SIGNIFICANT CHANGE UP
-  TOBRAMYCIN: SIGNIFICANT CHANGE UP
CULTURE RESULTS: SIGNIFICANT CHANGE UP
METHOD TYPE: SIGNIFICANT CHANGE UP
ORGANISM # SPEC MICROSCOPIC CNT: SIGNIFICANT CHANGE UP
ORGANISM # SPEC MICROSCOPIC CNT: SIGNIFICANT CHANGE UP
SPECIMEN SOURCE: SIGNIFICANT CHANGE UP

## 2021-08-06 ENCOUNTER — NON-APPOINTMENT (OUTPATIENT)
Age: 46
End: 2021-08-06

## 2021-08-12 PROBLEM — F41.9 ANXIETY DISORDER, UNSPECIFIED: Chronic | Status: ACTIVE | Noted: 2021-08-03

## 2021-08-12 PROBLEM — U07.1 COVID-19: Chronic | Status: ACTIVE | Noted: 2021-08-03

## 2021-08-12 PROBLEM — K21.9 GASTRO-ESOPHAGEAL REFLUX DISEASE WITHOUT ESOPHAGITIS: Chronic | Status: ACTIVE | Noted: 2021-08-03

## 2021-08-12 PROBLEM — E78.5 HYPERLIPIDEMIA, UNSPECIFIED: Chronic | Status: ACTIVE | Noted: 2021-08-03

## 2021-08-16 ENCOUNTER — APPOINTMENT (OUTPATIENT)
Dept: INTERNAL MEDICINE | Facility: CLINIC | Age: 46
End: 2021-08-16
Payer: COMMERCIAL

## 2021-08-16 VITALS
HEART RATE: 67 BPM | WEIGHT: 222 LBS | RESPIRATION RATE: 16 BRPM | SYSTOLIC BLOOD PRESSURE: 130 MMHG | HEIGHT: 67 IN | OXYGEN SATURATION: 98 % | BODY MASS INDEX: 34.84 KG/M2 | DIASTOLIC BLOOD PRESSURE: 80 MMHG

## 2021-08-16 DIAGNOSIS — I25.10 ATHEROSCLEROTIC HEART DISEASE OF NATIVE CORONARY ARTERY W/OUT ANGINA PECTORIS: ICD-10-CM

## 2021-08-16 DIAGNOSIS — Z95.5 ATHEROSCLEROTIC HEART DISEASE OF NATIVE CORONARY ARTERY W/OUT ANGINA PECTORIS: ICD-10-CM

## 2021-08-16 PROCEDURE — 99214 OFFICE O/P EST MOD 30 MIN: CPT | Mod: 25

## 2021-08-16 PROCEDURE — 99406 BEHAV CHNG SMOKING 3-10 MIN: CPT

## 2021-08-16 NOTE — PHYSICAL EXAM
[No Acute Distress] : no acute distress [Well Nourished] : well nourished [Well Developed] : well developed [Well-Appearing] : well-appearing [Normal Voice/Communication] : normal voice/communication [PERRL] : pupils equal round and reactive to light [Normal Sclera/Conjunctiva] : normal sclera/conjunctiva [EOMI] : extraocular movements intact [Normal Outer Ear/Nose] : the outer ears and nose were normal in appearance [Normal Oropharynx] : the oropharynx was normal [No JVD] : no jugular venous distention [No Lymphadenopathy] : no lymphadenopathy [Supple] : supple [Thyroid Normal, No Nodules] : the thyroid was normal and there were no nodules present [No Respiratory Distress] : no respiratory distress  [No Accessory Muscle Use] : no accessory muscle use [Clear to Auscultation] : lungs were clear to auscultation bilaterally [Normal Rate] : normal rate  [Regular Rhythm] : with a regular rhythm [No Murmur] : no murmur heard [Normal S1, S2] : normal S1 and S2 [No Carotid Bruits] : no carotid bruits [No Abdominal Bruit] : a ~M bruit was not heard ~T in the abdomen [Pedal Pulses Present] : the pedal pulses are present [No Edema] : there was no peripheral edema [No Palpable Aorta] : no palpable aorta [Soft] : abdomen soft [Non Tender] : non-tender [Non-distended] : non-distended [No Masses] : no abdominal mass palpated [No HSM] : no HSM [Normal Bowel Sounds] : normal bowel sounds [Normal Posterior Cervical Nodes] : no posterior cervical lymphadenopathy [Normal Anterior Cervical Nodes] : no anterior cervical lymphadenopathy [No CVA Tenderness] : no CVA  tenderness [No Spinal Tenderness] : no spinal tenderness [No Joint Swelling] : no joint swelling [No Rash] : no rash [Grossly Normal Strength/Tone] : grossly normal strength/tone [Coordination Grossly Intact] : coordination grossly intact [No Focal Deficits] : no focal deficits [Normal Gait] : normal gait [Speech Grossly Normal] : speech grossly normal [Memory Grossly Normal] : memory grossly normal [Normal Affect] : the affect was normal [Alert and Oriented x3] : oriented to person, place, and time [Normal Mood] : the mood was normal [Normal Insight/Judgement] : insight and judgment were intact [de-identified] : obese

## 2021-08-16 NOTE — HISTORY OF PRESENT ILLNESS
[Coronary Artery Disease] : coronary artery disease [No Pertinent Pulmonary History] : no history of asthma, COPD, sleep apnea, or smoking [No Adverse Anesthesia Reaction] : no adverse anesthesia reaction in self or family member [(Patient denies any chest pain, claudication, dyspnea on exertion, orthopnea, palpitations or syncope)] : Patient denies any chest pain, claudication, dyspnea on exertion, orthopnea, palpitations or syncope [Moderate (4-6 METs)] : Moderate (4-6 METs) [Anti-Platelet Agents: _____] : Anti-Platelet Agents: [unfilled] [Aortic Stenosis] : no aortic stenosis [Atrial Fibrillation] : no atrial fibrillation [Recent Myocardial Infarction] : no recent myocardial infarction [Implantable Device/Pacemaker] : no implantable device/pacemaker [Chronic Anticoagulation] : no chronic anticoagulation [Chronic Kidney Disease] : no chronic kidney disease [Diabetes] : no diabetes [FreeTextEntry1] : Robotic Rt RSO [FreeTextEntry2] : 8/20/21 [FreeTextEntry4] : 45 year old woman s/p admission to [facility] with AWMI ans SP PCI x 2 to LAD with resultant moderate to severe LV dysfunction. Also with hx HTn, HLD, ovarian cyst, following with Dr Anderson, anx with depression, insomnia. \par following with local cardiology - Dr Guevara - note from last month on the chart.  She has f/u appt tomorrow.  Brilinta currently on hold for the surgery.  Still on the ASA.\par Has been following with cardiac rehab - on hold\par She is an active smoker - was cutting down, trying to quit.  Will be using the patch.  Has been good most days.  \par Has been walking regularly. Has been following diet.\par Taking meds w/o issues. \par Taking lasix as needed. only occasionally. \par currently w/o c/o. no cv sx. Had been exercising at cardiac rehab - treadmill, bike, hand bike and rower.  Has been using light weights. Has been going for walks - 5 miles - had been on hold since last week b/c of the hot weather.  \par no GI sx. \par no noted neuro sx. \par Taking melatonin as needed.\par she was also referred to see GI for poss colonoscopy.  Has seen - to f/u and get after the surgery.  \par Reports that she has been having anxiety - has been increased by the upcoming surg.  Denies depression.  Asking for temp supply of lorazepam.  \par \par vaccines - reported as up to date\par \par Mammo - in the past year.\par gyn- Dr Anderson, changed to Dr Murray\par ophtho - due  [FreeTextEntry3] : Dr Murray [FreeTextEntry7] : PCI x 2 to LAD with resultant moderate to severe LV dysfunction

## 2021-08-16 NOTE — ASSESSMENT
[Patient Optimized for Surgery] : Patient optimized for surgery [Other: _____] : [unfilled] [As per surgery] : as per surgery [Modify medications prior to procedure] : Modify medications prior to procedure [FreeTextEntry4] : 45 year old woman s/p admission to [facility] with AWMI ans SP PCI x 2 to LAD with resultant moderate to severe LV dysfunction. Also with hx HTn, HLD, ovarian cyst, anx with depression, insomnia. \par following with local cardiology - Dr Guevara.  Last seen last mo and she has f/u appt tomorrow.  has been instructed re holding brilinta per cardiology for the surgery\par Has been following with cardiac rehab.  doing well with exercise - increasing her amount.   currently on hold\par She is an active smoker -  cutting down, trying to quit.  Will be using the patch if needed.\par currently w/o c/o. no cv sx. Had been exercising at cardiac rehab - treadmill, bike, hand bike and rower.  Has been using light weights. Has been going for walks - 5 miles.  \par As she has been stable on current rx, has been following closely with cardiology and tolerates adequate METs, there should be no contraindications for the planned procedure.  She will be seeing cardiology tomorrow for final cardiac clearance.  \par she has discussed with cardiology med modifications in anticipation of the planned procedure  \par \par Answered pt's questions re: the surgery and pre-op. [FreeTextEntry7] : as per surgery

## 2021-08-16 NOTE — COUNSELING
[Weight management counseling provided] : Weight management [Healthy eating counseling provided] : healthy eating [Activity counseling provided] : activity [Discussed Risks and Advised to Quit Smoking] : Discussed risks and advised to quit smoking [Discussed Cessation Medication] : cessation medication was discussed [Discussed Cessation Strategies] : cessation strategies were discussed [Good understanding] : Patient has a good understanding of disease, goals and obesity follow-up plan [Decrease Portions] : Decrease food portions [Quit Smoking] : Quit Smoking [None] : None [Tobacco Use Cessation Intermediate Greater Than 3 Minutes Up to 10 Minutes] : Tobacco Use Cessation Intermediate Greater Than 3 Minutes Up to 10 Minutes

## 2021-08-16 NOTE — RESULTS/DATA
[] : results reviewed [de-identified] : nl [de-identified] : nl [de-identified] : Reviewed ucx and notes from gyn.\par reviewed recent note from cardiology.

## 2021-08-17 ENCOUNTER — APPOINTMENT (OUTPATIENT)
Dept: DISASTER EMERGENCY | Facility: CLINIC | Age: 46
End: 2021-08-17

## 2021-08-18 LAB — SARS-COV-2 N GENE NPH QL NAA+PROBE: NOT DETECTED

## 2021-08-19 ENCOUNTER — TRANSCRIPTION ENCOUNTER (OUTPATIENT)
Age: 46
End: 2021-08-19

## 2021-08-19 NOTE — ASU PATIENT PROFILE, ADULT - HEALTHCARE INFORMATION NEEDED, PROFILE
[FreeTextEntry1] : Musculoskeletal \par osteoporosis - Prolia 60mg/mL administered subcutaneously to right triceps area; will continue to monitor Calcium/Vitamin D\par chronic back/neck pain - secondary to spinal stenosis - continue Lyrica (Pregabalin) 75mg TID p.o.q.d; continue Meloxicam 15mg p.o.q.d. with food as directed \par Psychiatry\par continue Venlafaxine HCl 25mg TID p.o.q.d. as directed \par Urology\par chronic UTIs - continue Methenamine Hippurate 1 GM p.o.q.d. as directed and OTC Cranberry 405mg BID p.o.q.d.\par incontinence/overactive bladder - continue Trospium Chloride ER 60mg p.o.q.d\par \par Patient to follow up for fasting blood work.  none

## 2021-08-20 ENCOUNTER — RESULT REVIEW (OUTPATIENT)
Age: 46
End: 2021-08-20

## 2021-08-20 ENCOUNTER — OUTPATIENT (OUTPATIENT)
Dept: OUTPATIENT SERVICES | Facility: HOSPITAL | Age: 46
LOS: 1 days | Discharge: HOME | End: 2021-08-20
Payer: COMMERCIAL

## 2021-08-20 ENCOUNTER — APPOINTMENT (OUTPATIENT)
Dept: GYNECOLOGIC ONCOLOGY | Facility: HOSPITAL | Age: 46
End: 2021-08-20

## 2021-08-20 VITALS
SYSTOLIC BLOOD PRESSURE: 153 MMHG | RESPIRATION RATE: 14 BRPM | TEMPERATURE: 98 F | DIASTOLIC BLOOD PRESSURE: 98 MMHG | HEIGHT: 67 IN | HEART RATE: 57 BPM | WEIGHT: 216.93 LBS | OXYGEN SATURATION: 98 %

## 2021-08-20 VITALS
OXYGEN SATURATION: 100 % | HEART RATE: 66 BPM | DIASTOLIC BLOOD PRESSURE: 97 MMHG | SYSTOLIC BLOOD PRESSURE: 152 MMHG | RESPIRATION RATE: 19 BRPM | TEMPERATURE: 98 F

## 2021-08-20 DIAGNOSIS — N83.201 UNSPECIFIED OVARIAN CYST, RIGHT SIDE: ICD-10-CM

## 2021-08-20 DIAGNOSIS — D27.1 BENIGN NEOPLASM OF LEFT OVARY: Chronic | ICD-10-CM

## 2021-08-20 DIAGNOSIS — N83.209 UNSPECIFIED OVARIAN CYST, UNSPECIFIED SIDE: ICD-10-CM

## 2021-08-20 DIAGNOSIS — Z98.890 OTHER SPECIFIED POSTPROCEDURAL STATES: Chronic | ICD-10-CM

## 2021-08-20 PROCEDURE — S2900 ROBOTIC SURGICAL SYSTEM: CPT | Mod: NC

## 2021-08-20 PROCEDURE — 49203: CPT

## 2021-08-20 PROCEDURE — 88112 CYTOPATH CELL ENHANCE TECH: CPT | Mod: 26

## 2021-08-20 PROCEDURE — 88305 TISSUE EXAM BY PATHOLOGIST: CPT | Mod: 26

## 2021-08-20 PROCEDURE — 58661 LAPAROSCOPY REMOVE ADNEXA: CPT

## 2021-08-20 RX ORDER — OXYCODONE HYDROCHLORIDE 5 MG/1
1 TABLET ORAL
Qty: 5 | Refills: 0
Start: 2021-08-20

## 2021-08-20 RX ORDER — ONDANSETRON 8 MG/1
4 TABLET, FILM COATED ORAL
Refills: 0 | Status: DISCONTINUED | OUTPATIENT
Start: 2021-08-20 | End: 2021-09-03

## 2021-08-20 RX ORDER — HYDROMORPHONE HYDROCHLORIDE 2 MG/ML
0.5 INJECTION INTRAMUSCULAR; INTRAVENOUS; SUBCUTANEOUS
Refills: 0 | Status: DISCONTINUED | OUTPATIENT
Start: 2021-08-20 | End: 2021-08-20

## 2021-08-20 RX ADMIN — HYDROMORPHONE HYDROCHLORIDE 0.5 MILLIGRAM(S): 2 INJECTION INTRAMUSCULAR; INTRAVENOUS; SUBCUTANEOUS at 19:00

## 2021-08-20 RX ADMIN — HYDROMORPHONE HYDROCHLORIDE 0.5 MILLIGRAM(S): 2 INJECTION INTRAMUSCULAR; INTRAVENOUS; SUBCUTANEOUS at 18:43

## 2021-08-20 RX ADMIN — SODIUM CHLORIDE 30 MILLILITER(S): 9 INJECTION, SOLUTION INTRAVENOUS at 18:43

## 2021-08-20 NOTE — PROGRESS NOTE ADULT - ASSESSMENT
A/P: 47 Y/O S/P Robotic RSO, DEVONTE  Pt voided, ambulated and is tolerating some Regular diet  D/C home now

## 2021-08-20 NOTE — ASU DISCHARGE PLAN (ADULT/PEDIATRIC) - CALL YOUR DOCTOR IF YOU HAVE ANY OF THE FOLLOWING:
Bleeding that does not stop/Pain not relieved by Medications/Fever greater than (need to indicate Fahrenheit or Celsius)/Unable to urinate Bleeding that does not stop/Pain not relieved by Medications/Fever greater than (need to indicate Fahrenheit or Celsius)/Wound/Surgical Site with redness, or foul smelling discharge or pus/Unable to urinate/Inability to tolerate liquids or foods

## 2021-08-20 NOTE — PROGRESS NOTE ADULT - SUBJECTIVE AND OBJECTIVE BOX
PA GYN/ONC POST OP NOTE:    Pt seen and examined in ASU, was sitting up in chair having some crackers, applesauce and soda. She received a dose of Dilaudid earlier and her pain is well controlled. Pt denies chest pain, SOB, palpitations, nausea/vomiting. She ambulated to the bathroom and voided and is waiting for D/C paperwork to be given to her.    Vital Signs Last 24 Hrs  T(C): 36.8 (20 Aug 2021 20:00), Max: 36.8 (20 Aug 2021 20:00)  T(F): 98.3 (20 Aug 2021 20:00), Max: 98.3 (20 Aug 2021 20:HR: 66 (20 Aug 2021 20:00) (57 - 66)  BP: 152/97 (20 Aug 2021 20:00) (142/88 - 157/89)  BP(mean): 108 (20 Aug 2021 19:45) (95 - 113)  RR: 19 (20 Aug 2021 20:00) (11 - 19)  SpO2: 100% (20 Aug 2021 20:00) (96% - 100%)    U/O:    I&O's Detail    20 Aug 2021 07:01  -  20 Aug 2021 21:00  --------------------------------------------------------  IN:    Oral Fluid: 180 mL  Total IN: 180 mL    OUT:    Voided (mL): 200 mL  Total OUT: 200 mL    Total NET: -20 mL    PHYSICAL EXAM:  CHEST/LUNG: CTA B/L  HEART: S1S2 RRR  ABDOMEN: Soft, appropriately tender  INCISION: Scope sites C/D/I  EXTREMITIES: NT B/L    MEDICATIONS  (STANDING):  lactated ringers. 1000 milliLiter(s) (30 mL/Hr) IV Continuous <Continuous>    MEDICATIONS  (PRN):  HYDROmorphone  Injectable 0.5 milliGRAM(s) IV Push every 10 minutes PRN Moderate Pain (4 - 6)  ondansetron Injectable 4 milliGRAM(s) IV Push every 15 minutes PRN Nausea and/or Vomiting

## 2021-08-20 NOTE — ASU DISCHARGE PLAN (ADULT/PEDIATRIC) - ASU DC SPECIAL INSTRUCTIONSFT
Postoperative Instructions      Pain control: For pain control, take the following   1. Motrin 600mg four times a day, take with food  2. Add Tylenol 975 four times a day, alternated with motrin  3. Add oxycodone as needed for severe pain not managed well by motrin and tylenol. A prescription has been sent to your pharmacy on file.   Motrin and Tylenol can be obtained over the counter.    Blood Clot Prevention: You may restart your anticoagulation medication the day after surgery (8/21)    Postoperative restrictions: Do not drive or make important decisions for 24 hours after anesthesia. You may feel drowsy for 24 hours and should have a responsible adult with you during that time. Nothing in the vagina (tampons, sexual intercourse), No tub baths, pools or hot tubs for 8 weeks (showers are ok!). No lifting anything heavier than 15 lbs, no strenuous exercise for 8 weeks after surgery. Do not pull or cut any stitches that you see around your incision.    Vaginal bleeding: Spotting and intermittent passage of blood clots per vagina is normal in first few weeks after surgery. If you are soaking 1 pad per hour, that is not normal and you should notify Dr. Murray' office and seek medical attention right away.     Vaginal discharge: Vaginal discharge (all colors) is normal after vaginal surgery. If you’ve had vaginal surgery, you have sutures in your vagina which take 3 months to fully absorb. You may have vaginal discharge during this time. This is normal.    Signs of Infection: Some postoperative pain and discomfort is normal. However, if you experience any of the following, you may be developing an infection and should be seen by your doctor: pain that does not get better with the oral medications listed above, fever greater than 100.4F, foul smelling discharge coming from the surgical site, nausea and vomiting that does not stop (especially if you are unable to tolerate oral intake), or inability to urinate. If you experience any of these symptoms, call your doctor's office to be seen right away.

## 2021-08-20 NOTE — BRIEF OPERATIVE NOTE - NSICDXBRIEFPROCEDURE_GEN_ALL_CORE_FT
Initiate Treatment: hydroxyzine, phototherapy Detail Level: Zone Continue Regimen: Gabapentin PROCEDURES:  Robot-assisted laparoscopic salpingoopherectomy 20-Aug-2021 17:08:01  Danielle Lema  Lysis of peritoneal adhesions 20-Aug-2021 17:08:44  Danielle Lema

## 2021-08-20 NOTE — BRIEF OPERATIVE NOTE - OPERATION/FINDINGS
EUA: palpable right adnexal mass. Laparoscopy: adhesions to the anterior abdominal wall throughout. Grossly normal abdominal structures: bowel, liver, diaphragm, and appendix were appreciated. Well circumscribed right adnexal multiloculated cyst noted within the pelvis. Adhesed to the posterior cul-de-sac and pelvic sidewall. Good hemostasis appreciated following removal of the cyst, ovary, and tube. Vistaseal utlizied with good hemostasis. EUA: palpable right adnexal mass. Grossly normal abdominal structures: bowel, liver, diaphragm were appreciated. Well circumscribed right adnexal multiloculated cyst noted within the pelvis. Adherent to the posterior cul-de-sac and pelvic sidewall. Good hemostasis appreciated following removal of the cyst, ovary, and tube. Benign on frozen section.

## 2021-08-20 NOTE — PACU DISCHARGE NOTE - HYDRATION STATUS:
[General Appearance - Well Developed] : well developed [General Appearance - Well Nourished] : well nourished [Edema] : no peripheral edema [Exaggerated Use Of Accessory Muscles For Inspiration] : no accessory muscle use Satisfactory [Abdomen Soft] : soft [Abdomen Tenderness] : non-tender [Abdomen Mass (___ Cm)] : no abdominal mass palpated [Abdomen Hernia] : no hernia was discovered [Urethral Meatus] : meatus normal [Penis Abnormality] : normal circumcised penis [Urinary Bladder Findings] : the bladder was normal on palpation [Scrotum] : the scrotum was normal [Epididymis] : the epididymides were normal [Testes Tenderness] : no tenderness of the testes [Testes Mass (___cm)] : there were no testicular masses [Normal Station and Gait] : the gait and station were normal for the patient's age [] : no rash [No Focal Deficits] : no focal deficits [Oriented To Time, Place, And Person] : oriented to person, place, and time

## 2021-08-20 NOTE — ASU DISCHARGE PLAN (ADULT/PEDIATRIC) - CARE PROVIDER_API CALL
Lina Murray)  Gynecologic Oncology; Obstetrics and Gynecology  [address] [place] [address]  Phone: [phone]  Fax: [phone]  Follow Up Time:

## 2021-08-20 NOTE — BRIEF OPERATIVE NOTE - SPECIMENS
Pelvic washings, Pelvic washings, right Fallopian tube and ovary, omental nodule, IP stump with nodules

## 2021-08-20 NOTE — ASU DISCHARGE PLAN (ADULT/PEDIATRIC) - FOLLOW UP APPOINTMENTS
425 may also call Recovery Room (PACU) 24/7 @ [phone]/[facility]

## 2021-08-20 NOTE — ASU DISCHARGE PLAN (ADULT/PEDIATRIC) - NURSING INSTRUCTIONS
You received IV Tylenol for pain management at 5pm___. Please DO NOT take any Tylenol (Acetaminophen) containing products, such as Vicodin, Percocet, Excedrin, and cold medications for the next 6 hours (until ___11 PM). DO NOT TAKE MORE THAN 3000 MG OF TYLENOL in a 24 hour period.

## 2021-08-20 NOTE — ASU DISCHARGE PLAN (ADULT/PEDIATRIC) - ACTIVITY LEVEL
Nothing per rectum/Nothing per vagina/No tub baths/No douching/No tampons/No intercourse No heavy lifting/Nothing per rectum/Nothing per vagina/No tub baths/No douching/No tampons/No intercourse

## 2021-08-24 ENCOUNTER — NON-APPOINTMENT (OUTPATIENT)
Age: 46
End: 2021-08-24

## 2021-08-24 LAB — NON-GYNECOLOGICAL CYTOLOGY STUDY: SIGNIFICANT CHANGE UP

## 2021-09-02 ENCOUNTER — APPOINTMENT (OUTPATIENT)
Dept: GYNECOLOGIC ONCOLOGY | Facility: CLINIC | Age: 46
End: 2021-09-02
Payer: COMMERCIAL

## 2021-09-02 VITALS
WEIGHT: 217.5 LBS | HEART RATE: 66 BPM | BODY MASS INDEX: 34.14 KG/M2 | DIASTOLIC BLOOD PRESSURE: 114 MMHG | HEIGHT: 67 IN | SYSTOLIC BLOOD PRESSURE: 152 MMHG

## 2021-09-02 PROCEDURE — 99024 POSTOP FOLLOW-UP VISIT: CPT

## 2021-09-03 LAB — SURGICAL PATHOLOGY STUDY: SIGNIFICANT CHANGE UP

## 2021-09-15 ENCOUNTER — APPOINTMENT (OUTPATIENT)
Dept: INTERNAL MEDICINE | Facility: CLINIC | Age: 46
End: 2021-09-15
Payer: COMMERCIAL

## 2021-09-15 VITALS
WEIGHT: 216 LBS | DIASTOLIC BLOOD PRESSURE: 84 MMHG | HEART RATE: 67 BPM | OXYGEN SATURATION: 98 % | RESPIRATION RATE: 16 BRPM | BODY MASS INDEX: 33.9 KG/M2 | SYSTOLIC BLOOD PRESSURE: 128 MMHG | HEIGHT: 67 IN

## 2021-09-15 DIAGNOSIS — Z87.440 PERSONAL HISTORY OF URINARY (TRACT) INFECTIONS: ICD-10-CM

## 2021-09-15 LAB — NONINV COLON CA DNA+OCC BLD SCRN STL QL: NORMAL

## 2021-09-15 PROCEDURE — 99406 BEHAV CHNG SMOKING 3-10 MIN: CPT | Mod: 25

## 2021-09-15 PROCEDURE — 90686 IIV4 VACC NO PRSV 0.5 ML IM: CPT

## 2021-09-15 PROCEDURE — G0008: CPT

## 2021-09-15 PROCEDURE — 99214 OFFICE O/P EST MOD 30 MIN: CPT | Mod: 25

## 2021-09-15 NOTE — HEALTH RISK ASSESSMENT
[] : Yes [Yes] : Yes [2 - 3 times a week (3 pts)] : 2 - 3  times a week (3 points) [3 or 4 (1 pt)] : 3 or 4  (1 point) [Never (0 pts)] : Never (0 points) [No] : In the past 12 months have you used drugs other than those required for medical reasons? No [Other reason not done] : Other reason not done [Audit-CScore] : 4 [de-identified] : Hx anx and dep - to establish with psych

## 2021-09-15 NOTE — COUNSELING
[Risk of tobacco use and health benefits of smoking cessation discussed] : Risk of tobacco use and health benefits of smoking cessation discussed [Cessation strategies including cessation program discussed] : Cessation strategies including cessation program discussed [Use of nicotine replacement therapies and other medications discussed] : Use of nicotine replacement therapies and other medications discussed [Encouraged to pick a quit date and identify support needed to quit] : Encouraged to pick a quit date and identify support needed to quit [Willing to Quit Smoking] : Willing to quit smoking [Participate in Class] : Participate in class [Tobacco Use Cessation Intermediate Greater Than 3 Minutes Up to 10 Minutes] : Tobacco Use Cessation Intermediate Greater Than 3 Minutes Up to 10 Minutes [AUDIT-C Screening administered and reviewed] : AUDIT-C Screening administered and reviewed [Hazards of at-risk alcohol use discussed] : Hazards of at-risk alcohol use discussed [Strategies to reduce or eliminate alcohol use discussed] : Strategies to reduce or eliminate alcohol use discussed [Support options provided] : Support options provided [Potential consequences of obesity discussed] : Potential consequences of obesity discussed [Benefits of weight loss discussed] : Benefits of weight loss discussed [Encouraged to increase physical activity] : Encouraged to increase physical activity [Decrease Portions] : decrease portions [None] : None [Good understanding] : Patient has a good understanding of lifestyle changes and steps needed to achieve self management goal [FreeTextEntry1] : 4

## 2021-09-15 NOTE — HISTORY OF PRESENT ILLNESS
[FreeTextEntry1] : HTN, HLD, CAD, ovarian cyst, anx/dep, insomnia [de-identified] : 45 year old woman s/p admission to [facility] with AWMI ans SP PCI x 2 to LAD with resultant moderate to severe LV dysfunction. Also with hx HTn, HLD, ovarian cyst, anx with depression, insomnia. \par s/p Robotic RSO, extensive lysis of adhesions, resection of peritoneal nodule\par following with local cardiology - Dr Guevara - Asking to see other cardiologist.  \par Has been following with cardiac rehab - on hold\par She is an active smoker - was cutting down, trying to quit. Will be using the patch. Has been good most days. some cutting down\par to cut down on the drinking\par Has been walking daily. Has been following diet.\par Taking meds w/o issues. \par Taking lasix as needed. only occasionally. \par currently w/o c/o. no cv sx. Had been exercising at cardiac rehab - treadmill, bike, hand bike and rower. Has been using light weights. Has been going for walks - 5 miles - had been on hold since last week b/c of the hot weather. \par no GI sx. \par no noted neuro sx. \par Taking melatonin as needed.\par SAw GI - had cologaurd 8/21\par Reports that she has been having anxiety - Denies depression. Asking for temp supply of lorazepam. Wants to try to spread it out.  \par \par vaccines - reported as up to date\par \par Mammo - in the past year.\par gyn- Dr Anderson, changed to Dr Murray\par ophtho - due.

## 2021-09-15 NOTE — PHYSICAL EXAM
[No Acute Distress] : no acute distress [Well Nourished] : well nourished [Well Developed] : well developed [Well-Appearing] : well-appearing [Normal Voice/Communication] : normal voice/communication [Normal Sclera/Conjunctiva] : normal sclera/conjunctiva [PERRL] : pupils equal round and reactive to light [EOMI] : extraocular movements intact [Normal Outer Ear/Nose] : the outer ears and nose were normal in appearance [Normal Oropharynx] : the oropharynx was normal [No JVD] : no jugular venous distention [No Lymphadenopathy] : no lymphadenopathy [Supple] : supple [Thyroid Normal, No Nodules] : the thyroid was normal and there were no nodules present [No Respiratory Distress] : no respiratory distress  [No Accessory Muscle Use] : no accessory muscle use [Clear to Auscultation] : lungs were clear to auscultation bilaterally [Normal Rate] : normal rate  [Regular Rhythm] : with a regular rhythm [Normal S1, S2] : normal S1 and S2 [No Murmur] : no murmur heard [No Carotid Bruits] : no carotid bruits [No Abdominal Bruit] : a ~M bruit was not heard ~T in the abdomen [Pedal Pulses Present] : the pedal pulses are present [No Edema] : there was no peripheral edema [No Palpable Aorta] : no palpable aorta [Soft] : abdomen soft [Non Tender] : non-tender [Non-distended] : non-distended [No Masses] : no abdominal mass palpated [No HSM] : no HSM [Normal Bowel Sounds] : normal bowel sounds [Normal Posterior Cervical Nodes] : no posterior cervical lymphadenopathy [Normal Anterior Cervical Nodes] : no anterior cervical lymphadenopathy [No CVA Tenderness] : no CVA  tenderness [No Spinal Tenderness] : no spinal tenderness [No Joint Swelling] : no joint swelling [Grossly Normal Strength/Tone] : grossly normal strength/tone [No Rash] : no rash [Coordination Grossly Intact] : coordination grossly intact [No Focal Deficits] : no focal deficits [Normal Gait] : normal gait [Speech Grossly Normal] : speech grossly normal [Memory Grossly Normal] : memory grossly normal [Normal Affect] : the affect was normal [Alert and Oriented x3] : oriented to person, place, and time [Normal Mood] : the mood was normal [Normal Insight/Judgement] : insight and judgment were intact [de-identified] : obese

## 2021-09-17 LAB
ALBUMIN SERPL ELPH-MCNC: 4.7 G/DL
ALP BLD-CCNC: 80 U/L
ALT SERPL-CCNC: 21 U/L
ANION GAP SERPL CALC-SCNC: 10 MMOL/L
AST SERPL-CCNC: 13 U/L
BASOPHILS # BLD AUTO: 0.1 K/UL
BASOPHILS NFR BLD AUTO: 1.2 %
BILIRUB SERPL-MCNC: 0.6 MG/DL
BUN SERPL-MCNC: 16 MG/DL
CALCIUM SERPL-MCNC: 9.5 MG/DL
CHLORIDE SERPL-SCNC: 107 MMOL/L
CHOLEST SERPL-MCNC: 144 MG/DL
CO2 SERPL-SCNC: 25 MMOL/L
CREAT SERPL-MCNC: 0.7 MG/DL
EOSINOPHIL # BLD AUTO: 0.4 K/UL
EOSINOPHIL NFR BLD AUTO: 4.7 %
ESTIMATED AVERAGE GLUCOSE: 108 MG/DL
GLUCOSE SERPL-MCNC: 96 MG/DL
HBA1C MFR BLD HPLC: 5.4 %
HCT VFR BLD CALC: 49.2 %
HDLC SERPL-MCNC: 48 MG/DL
HGB BLD-MCNC: 14.3 G/DL
IMM GRANULOCYTES NFR BLD AUTO: 0.2 %
LDLC SERPL CALC-MCNC: 66 MG/DL
LYMPHOCYTES # BLD AUTO: 1.88 K/UL
LYMPHOCYTES NFR BLD AUTO: 22 %
MAGNESIUM SERPL-MCNC: 2.2 MG/DL
MAN DIFF?: NORMAL
MCHC RBC-ENTMCNC: 28.1 PG
MCHC RBC-ENTMCNC: 29.1 GM/DL
MCV RBC AUTO: 96.9 FL
MONOCYTES # BLD AUTO: 0.49 K/UL
MONOCYTES NFR BLD AUTO: 5.7 %
NEUTROPHILS # BLD AUTO: 5.65 K/UL
NEUTROPHILS NFR BLD AUTO: 66.2 %
NONHDLC SERPL-MCNC: 96 MG/DL
PLATELET # BLD AUTO: 261 K/UL
POTASSIUM SERPL-SCNC: 4 MMOL/L
PROT SERPL-MCNC: 7 G/DL
RBC # BLD: 5.08 M/UL
RBC # FLD: 14.6 %
SODIUM SERPL-SCNC: 142 MMOL/L
TRIGL SERPL-MCNC: 153 MG/DL
TSH SERPL-ACNC: 1.21 UIU/ML
WBC # FLD AUTO: 8.54 K/UL

## 2021-11-22 ENCOUNTER — NON-APPOINTMENT (OUTPATIENT)
Age: 46
End: 2021-11-22

## 2021-11-22 ENCOUNTER — INPATIENT (INPATIENT)
Facility: HOSPITAL | Age: 46
LOS: 0 days | Discharge: HOME | End: 2021-11-23
Attending: INTERNAL MEDICINE | Admitting: INTERNAL MEDICINE
Payer: COMMERCIAL

## 2021-11-22 VITALS
TEMPERATURE: 97 F | SYSTOLIC BLOOD PRESSURE: 168 MMHG | RESPIRATION RATE: 20 BRPM | HEIGHT: 67 IN | DIASTOLIC BLOOD PRESSURE: 106 MMHG | HEART RATE: 65 BPM | OXYGEN SATURATION: 98 %

## 2021-11-22 DIAGNOSIS — R07.9 CHEST PAIN, UNSPECIFIED: ICD-10-CM

## 2021-11-22 DIAGNOSIS — F41.9 ANXIETY DISORDER, UNSPECIFIED: ICD-10-CM

## 2021-11-22 DIAGNOSIS — Z29.9 ENCOUNTER FOR PROPHYLACTIC MEASURES, UNSPECIFIED: ICD-10-CM

## 2021-11-22 DIAGNOSIS — I10 ESSENTIAL (PRIMARY) HYPERTENSION: ICD-10-CM

## 2021-11-22 DIAGNOSIS — E78.5 HYPERLIPIDEMIA, UNSPECIFIED: ICD-10-CM

## 2021-11-22 DIAGNOSIS — D27.1 BENIGN NEOPLASM OF LEFT OVARY: Chronic | ICD-10-CM

## 2021-11-22 DIAGNOSIS — I25.10 ATHEROSCLEROTIC HEART DISEASE OF NATIVE CORONARY ARTERY WITHOUT ANGINA PECTORIS: ICD-10-CM

## 2021-11-22 DIAGNOSIS — Z98.890 OTHER SPECIFIED POSTPROCEDURAL STATES: Chronic | ICD-10-CM

## 2021-11-22 LAB
ALBUMIN SERPL ELPH-MCNC: 3.9 G/DL — SIGNIFICANT CHANGE UP (ref 3.3–5)
ALP SERPL-CCNC: 75 U/L — SIGNIFICANT CHANGE UP (ref 40–120)
ALT FLD-CCNC: 17 U/L — SIGNIFICANT CHANGE UP (ref 4–33)
ANION GAP SERPL CALC-SCNC: 11 MMOL/L — SIGNIFICANT CHANGE UP (ref 7–14)
APTT BLD: 31.4 SEC — SIGNIFICANT CHANGE UP (ref 27–36.3)
AST SERPL-CCNC: 18 U/L — SIGNIFICANT CHANGE UP (ref 4–32)
BASOPHILS # BLD AUTO: 0.07 K/UL — SIGNIFICANT CHANGE UP (ref 0–0.2)
BASOPHILS NFR BLD AUTO: 0.8 % — SIGNIFICANT CHANGE UP (ref 0–2)
BILIRUB SERPL-MCNC: <0.2 MG/DL — SIGNIFICANT CHANGE UP (ref 0.2–1.2)
BUN SERPL-MCNC: 23 MG/DL — SIGNIFICANT CHANGE UP (ref 7–23)
CALCIUM SERPL-MCNC: 9.3 MG/DL — SIGNIFICANT CHANGE UP (ref 8.4–10.5)
CHLORIDE SERPL-SCNC: 106 MMOL/L — SIGNIFICANT CHANGE UP (ref 98–107)
CO2 SERPL-SCNC: 21 MMOL/L — LOW (ref 22–31)
CREAT SERPL-MCNC: 0.74 MG/DL — SIGNIFICANT CHANGE UP (ref 0.5–1.3)
EOSINOPHIL # BLD AUTO: 0.44 K/UL — SIGNIFICANT CHANGE UP (ref 0–0.5)
EOSINOPHIL NFR BLD AUTO: 4.8 % — SIGNIFICANT CHANGE UP (ref 0–6)
GLUCOSE SERPL-MCNC: 100 MG/DL — HIGH (ref 70–99)
HCT VFR BLD CALC: 41.6 % — SIGNIFICANT CHANGE UP (ref 34.5–45)
HGB BLD-MCNC: 12.9 G/DL — SIGNIFICANT CHANGE UP (ref 11.5–15.5)
IANC: 5.49 K/UL — SIGNIFICANT CHANGE UP (ref 1.5–8.5)
IMM GRANULOCYTES NFR BLD AUTO: 0.2 % — SIGNIFICANT CHANGE UP (ref 0–1.5)
INR BLD: 1.06 RATIO — SIGNIFICANT CHANGE UP (ref 0.88–1.16)
LYMPHOCYTES # BLD AUTO: 2.63 K/UL — SIGNIFICANT CHANGE UP (ref 1–3.3)
LYMPHOCYTES # BLD AUTO: 28.5 % — SIGNIFICANT CHANGE UP (ref 13–44)
MCHC RBC-ENTMCNC: 27.8 PG — SIGNIFICANT CHANGE UP (ref 27–34)
MCHC RBC-ENTMCNC: 31 GM/DL — LOW (ref 32–36)
MCV RBC AUTO: 89.7 FL — SIGNIFICANT CHANGE UP (ref 80–100)
MONOCYTES # BLD AUTO: 0.58 K/UL — SIGNIFICANT CHANGE UP (ref 0–0.9)
MONOCYTES NFR BLD AUTO: 6.3 % — SIGNIFICANT CHANGE UP (ref 2–14)
NEUTROPHILS # BLD AUTO: 5.49 K/UL — SIGNIFICANT CHANGE UP (ref 1.8–7.4)
NEUTROPHILS NFR BLD AUTO: 59.4 % — SIGNIFICANT CHANGE UP (ref 43–77)
NRBC # BLD: 0 /100 WBCS — SIGNIFICANT CHANGE UP
NRBC # FLD: 0 K/UL — SIGNIFICANT CHANGE UP
PLATELET # BLD AUTO: 222 K/UL — SIGNIFICANT CHANGE UP (ref 150–400)
POTASSIUM SERPL-MCNC: 4.2 MMOL/L — SIGNIFICANT CHANGE UP (ref 3.5–5.3)
POTASSIUM SERPL-SCNC: 4.2 MMOL/L — SIGNIFICANT CHANGE UP (ref 3.5–5.3)
PROT SERPL-MCNC: 6.5 G/DL — SIGNIFICANT CHANGE UP (ref 6–8.3)
PROTHROM AB SERPL-ACNC: 12 SEC — SIGNIFICANT CHANGE UP (ref 10.6–13.6)
RBC # BLD: 4.64 M/UL — SIGNIFICANT CHANGE UP (ref 3.8–5.2)
RBC # FLD: 13.6 % — SIGNIFICANT CHANGE UP (ref 10.3–14.5)
SARS-COV-2 RNA SPEC QL NAA+PROBE: SIGNIFICANT CHANGE UP
SODIUM SERPL-SCNC: 138 MMOL/L — SIGNIFICANT CHANGE UP (ref 135–145)
TROPONIN T, HIGH SENSITIVITY RESULT: <6 NG/L — SIGNIFICANT CHANGE UP
TROPONIN T, HIGH SENSITIVITY RESULT: <6 NG/L — SIGNIFICANT CHANGE UP
WBC # BLD: 9.23 K/UL — SIGNIFICANT CHANGE UP (ref 3.8–10.5)
WBC # FLD AUTO: 9.23 K/UL — SIGNIFICANT CHANGE UP (ref 3.8–10.5)

## 2021-11-22 PROCEDURE — 93306 TTE W/DOPPLER COMPLETE: CPT | Mod: 26

## 2021-11-22 PROCEDURE — 93010 ELECTROCARDIOGRAM REPORT: CPT

## 2021-11-22 PROCEDURE — 71046 X-RAY EXAM CHEST 2 VIEWS: CPT | Mod: 26

## 2021-11-22 PROCEDURE — 99223 1ST HOSP IP/OBS HIGH 75: CPT

## 2021-11-22 PROCEDURE — 99285 EMERGENCY DEPT VISIT HI MDM: CPT | Mod: 25

## 2021-11-22 RX ORDER — LANOLIN ALCOHOL/MO/W.PET/CERES
9 CREAM (GRAM) TOPICAL AT BEDTIME
Refills: 0 | Status: DISCONTINUED | OUTPATIENT
Start: 2021-11-22 | End: 2021-11-23

## 2021-11-22 RX ORDER — LANOLIN ALCOHOL/MO/W.PET/CERES
3 CREAM (GRAM) TOPICAL AT BEDTIME
Refills: 0 | Status: DISCONTINUED | OUTPATIENT
Start: 2021-11-22 | End: 2021-11-22

## 2021-11-22 RX ORDER — ASPIRIN/CALCIUM CARB/MAGNESIUM 324 MG
81 TABLET ORAL DAILY
Refills: 0 | Status: DISCONTINUED | OUTPATIENT
Start: 2021-11-22 | End: 2021-11-23

## 2021-11-22 RX ORDER — TICAGRELOR 90 MG/1
90 TABLET ORAL
Refills: 0 | Status: DISCONTINUED | OUTPATIENT
Start: 2021-11-22 | End: 2021-11-23

## 2021-11-22 RX ORDER — NIFEDIPINE 30 MG
30 TABLET, EXTENDED RELEASE 24 HR ORAL DAILY
Refills: 0 | Status: DISCONTINUED | OUTPATIENT
Start: 2021-11-22 | End: 2021-11-22

## 2021-11-22 RX ORDER — ATORVASTATIN CALCIUM 80 MG/1
40 TABLET, FILM COATED ORAL AT BEDTIME
Refills: 0 | Status: DISCONTINUED | OUTPATIENT
Start: 2021-11-22 | End: 2021-11-23

## 2021-11-22 RX ORDER — LOSARTAN POTASSIUM 100 MG/1
100 TABLET, FILM COATED ORAL DAILY
Refills: 0 | Status: DISCONTINUED | OUTPATIENT
Start: 2021-11-22 | End: 2021-11-23

## 2021-11-22 RX ORDER — ACETAMINOPHEN 500 MG
650 TABLET ORAL EVERY 6 HOURS
Refills: 0 | Status: DISCONTINUED | OUTPATIENT
Start: 2021-11-22 | End: 2021-11-23

## 2021-11-22 RX ORDER — METOPROLOL TARTRATE 50 MG
50 TABLET ORAL DAILY
Refills: 0 | Status: DISCONTINUED | OUTPATIENT
Start: 2021-11-22 | End: 2021-11-23

## 2021-11-22 RX ORDER — ENOXAPARIN SODIUM 100 MG/ML
40 INJECTION SUBCUTANEOUS DAILY
Refills: 0 | Status: DISCONTINUED | OUTPATIENT
Start: 2021-11-22 | End: 2021-11-23

## 2021-11-22 RX ORDER — HYDROCHLOROTHIAZIDE 25 MG
25 TABLET ORAL DAILY
Refills: 0 | Status: DISCONTINUED | OUTPATIENT
Start: 2021-11-22 | End: 2021-11-23

## 2021-11-22 RX ORDER — ONDANSETRON 8 MG/1
4 TABLET, FILM COATED ORAL EVERY 8 HOURS
Refills: 0 | Status: DISCONTINUED | OUTPATIENT
Start: 2021-11-22 | End: 2021-11-23

## 2021-11-22 RX ADMIN — Medication 9 MILLIGRAM(S): at 22:49

## 2021-11-22 RX ADMIN — TICAGRELOR 90 MILLIGRAM(S): 90 TABLET ORAL at 21:20

## 2021-11-22 RX ADMIN — TICAGRELOR 90 MILLIGRAM(S): 90 TABLET ORAL at 09:52

## 2021-11-22 RX ADMIN — Medication 0.5 MILLIGRAM(S): at 03:51

## 2021-11-22 RX ADMIN — Medication 50 MILLIGRAM(S): at 17:40

## 2021-11-22 RX ADMIN — ATORVASTATIN CALCIUM 40 MILLIGRAM(S): 80 TABLET, FILM COATED ORAL at 21:20

## 2021-11-22 RX ADMIN — LOSARTAN POTASSIUM 100 MILLIGRAM(S): 100 TABLET, FILM COATED ORAL at 09:34

## 2021-11-22 RX ADMIN — Medication 81 MILLIGRAM(S): at 09:34

## 2021-11-22 RX ADMIN — Medication 0.5 MILLIGRAM(S): at 17:39

## 2021-11-22 RX ADMIN — Medication 25 MILLIGRAM(S): at 17:39

## 2021-11-22 NOTE — H&P ADULT - ASSESSMENT
Ekg:SR @ 59 bpm New Flipped Ts V1- V2   45F active smoker, recent AWSTEMI in 12/2020 s/p PCI to LAD (PCI to prox and mid; has 50% lesion in mRCA that was not intervened on) presenting with  back pain for one day. 45F occassional  smoker, recent AWSTEMI in 12/2020 s/p PCI to LAD (PCI to prox and mid; has 50% lesion in mRCA that was not intervened on) presenting with  back pain for one day.

## 2021-11-22 NOTE — ED ADULT TRIAGE NOTE - CHIEF COMPLAINT QUOTE
pt c/o having chest pain radiating to back starting at 8pm. denies n/v,dizziness. PMH MI (dec 2020) ,stents

## 2021-11-22 NOTE — H&P ADULT - NSHPREVIEWOFSYSTEMS_GEN_ALL_CORE
REVIEW OF SYSTEMS:    CONSTITUTIONAL: No weakness, fevers or chills  EYES/ENT: No visual changes;  no throat pain   NECK: No pain or stiffness  RESPIRATORY: No cough, wheezing, hemoptysis; No shortness of breath  CARDIOVASCULAR: ++ chest pain No  palpitations  GASTROINTESTINAL: No abdominal or epigastric pain. No nausea, vomiting, or hematemesis; No diarrhea or constipation. No melena or hematochezia.  GENITOURINARY: No dysuria, change in frequency or hematuria  NEUROLOGICAL: No numbness or weakness  SKIN: No itching, burning, rashes, or lesions   Psych: No depression   All other review of systems is negative unless indicated above.

## 2021-11-22 NOTE — ED PROVIDER NOTE - ATTENDING CONTRIBUTION TO CARE
46F w h/o HTN, HLD, CAD s/p stents in December 2020, on asa/brilinta, p/w back pain that feels like a 'burning.' States symptoms started around 8pm when she was sitting. Denies exertional component, SOB, n/v/d, abdominal pain. States she had some chest discomfort earlier that felt like a pressure that resolved. Symptoms similar to prior MI. Denies radiation, numbness, weakness, tearing sensation, sob, lower extremity edema.     Except as documented in the HPI,  all other systems are negative    CONSTITUTIONAL: NAD, awake, alert  HEAD: Normocephalic; atraumatic  ENMT: External appears normal, MMM  CARD: Normal Sl, S2; no audible murmurs  RESP: normal wob, lungs ctab  ABD: soft, non-distended; non-tender  MSK: no edema, normal ROM in all four extremities, pulses equal bilaterally   SKIN: Warm, dry, no rashes  NEURO: aaox3, moving all extremities spontaneously, 5/5 strength throughout, sensation grossly intact     46F w h/o MI s/p stents p/w back and chest pain similar to prior MI, will conduct acs workup, admit for concerning story. Denies numbness, weakness, neuro deficits. Low suspicion for dissection. No sob, pedal edema, tachycardia, low suspicion for PE

## 2021-11-22 NOTE — ED ADULT NURSE REASSESSMENT NOTE - NS ED NURSE REASSESS COMMENT FT1
Pt awake and alert, respirations are even and unlabored, sating at 100% on RA, NSR on cardiac monitor. Pt states she feels less anxious and denies CP, SOB, dizziness. Pt ambulated to bathroom with stable gait and in no acute distress at this time.

## 2021-11-22 NOTE — H&P ADULT - PROBLEM SELECTOR PLAN 1
Cardiology following   Trop=<6 --><6   CXR: BRADLEY  asa, brinlinta, metoprolol  losartan, lipitor  - F/u TTE Cardiology following   Trop=<6 --><6   CXR: BRADLEY  asa, Brilinta, metoprolol  losartan, lipitor  - F/u TTE Cardiology following   Trop=<6 -->  <6   Ekg:SR @ 59 bpm Flipped Ts V1- V2 - similar to prior EKG   CXR: BRADLEY  asa, Brilinta, metoprolol  losartan, Lipitor  - F/u TTE

## 2021-11-22 NOTE — CHART NOTE - NSCHARTNOTEFT_GEN_A_CORE
Others' Prescriptions  Patient Name: Nallely ArreolaBirth Date: 1975  Address: [address] [place], [place] [address]Axz: Female  Rx Written	Rx Dispensed	Drug	Quantity	Days Supply	Prescriber Name	Prescriber Ashwini #	Payment Method	Dispenser  09/17/2021	09/17/2021	lorazepam 0.5 mg tablet	20	10	Misael Martinez MD	[id number]	Insurance	[facility] [id number]  08/20/2021	08/20/2021	oxycodone hcl 5 mg tablet	5	2	Bishnu Wolfe	[id number]	Insurance	[facility] [id number]  07/07/2021	07/07/2021	lorazepam 0.5 mg tablet	20	10	Misael Martinez MD	[id number]	Insurance	[facility] [id number]  12/08/2020	12/08/2020	lorazepam 0.5 mg tablet	10	10	Sania Gillespie MD	[id number]	Insurance	[facility] [id number]

## 2021-11-22 NOTE — H&P ADULT - NSHPPHYSICALEXAM_GEN_ALL_CORE
GENERAL: Middle age woman in  NAD  HEENT: EOMI, MMM, no oropharyngeal lesions or erythema appreciated  Pulm: normal work of breathing, CTABL  CV: RRR, S1&S2+, no m/r/g appreciated  ABDOMEN: soft, nt, nd, no hepatosplenomegaly  MSK: nl ROM  EXTREMITIES:  no appreciable edema in b/l LE  Neuro: A&Ox3, no focal deficits  SKIN: warm and dry, no visible rash

## 2021-11-22 NOTE — ED PROVIDER NOTE - CLINICAL SUMMARY MEDICAL DECISION MAKING FREE TEXT BOX
45 y/o F with PMH HTN, HLD, CAD s/p stent presenting to the ED c/o chest pain. Vitals stable. Patient is well appearing in no acute distress. EKG is unchanged from prior. Patient high risk chest pain in setting of CAD and having similar discomfort. Low suspicion for dissection, BP equal b/l on exam and pain not a tearing sensation or radiating from front to back. Will check labs and CXR but will require admission for monitoring. Joel Vale, DO PGY3

## 2021-11-22 NOTE — H&P ADULT - HISTORY OF PRESENT ILLNESS
45F active smoker, recent AWSTEMI in 12/2020 s/p PCI to LAD (PCI to prox and mid; has 50% lesion in mRCA that was not intervened on)  45F active smoker, recent AWSTEMI in 12/2020 s/p PCI to LAD (PCI to prox and mid; has 50% lesion in mRCA that was not intervened on)     Of note, patient presented in February with similar concerns. On feb 26, 2021, pt  underwent cath, showed patent vessels and non-obstructive CAD.  45F active smoker, recent AWSTEMI in 12/2020 s/p PCI to LAD (PCI to prox and mid; has 50% lesion in mRCA that was not intervened on) presenting with  back pain for one day. She reports that night before presentation around  8pm she felt onset of pain between her shoulder blades, she tried taking Tylenol and going to sleep but woke up with persistent pain and some chest discomfort. Pain is similar to when she had an MI and her stents placed in 2020. On current exam, patient reports feeling back to baseline. She is compliant with all medications. She was recently prescribed ativan and was wondering if this could be related to a panic attack. She denies any shortness of breath. No N/V fever or chills. Endorses occasional smoking. Of note, patient presented in February with similar concerns. On feb 26, 2021, pt  underwent cath, showed patent vessels and non-obstructive CAD. Patient reports its her sons bday tomorrow and needs to be discharged by tomorrow.

## 2021-11-22 NOTE — CONSULT NOTE ADULT - SUBJECTIVE AND OBJECTIVE BOX
DATE OF SERVICE: 11-22-21 @ 22:08    CHIEF COMPLAINT:Patient is a 46y old  Female who presents with a chief complaint of Chest pain (22 Nov 2021 12:12)      HISTORY OF PRESENT ILLNESS:HPI:  45F active smoker, recent AWSTEMI in 12/2020 s/p PCI to LAD (PCI to prox and mid; has 50% lesion in mRCA that was not intervened on) presenting with  back pain for one day. She reports that night before presentation around  8pm she felt onset of pain between her shoulder blades, she tried taking Tylenol and going to sleep but woke up with persistent pain and some chest discomfort. Pain is similar to when she had an MI and her stents placed in 2020. On current exam, patient reports feeling back to baseline. She is compliant with all medications. She was recently prescribed ativan and was wondering if this could be related to a panic attack. She denies any shortness of breath. No N/V fever or chills. Endorses occasional smoking. Of note, patient presented in February with similar concerns. On feb 26, 2021, pt  underwent cath, showed patent vessels and non-obstructive CAD. Patient reports its her sons bday tomorrow and needs to be discharged by tomorrow.  (22 Nov 2021 12:12)      PAST MEDICAL & SURGICAL HISTORY:  Hypertension    CAD (coronary artery disease)    Hyperlipidemia    Anxiety and depression    GERD (gastroesophageal reflux disease)    COVID-19    Benign ovarian tumor, left  and fibroid - partial hysterectomy    S/P cardiac cath  2 stents 12/2020    H/O meniscectomy of right knee            MEDICATIONS:  aspirin  chewable 81 milliGRAM(s) Oral daily  enoxaparin Injectable 40 milliGRAM(s) SubCutaneous daily  hydrochlorothiazide 25 milliGRAM(s) Oral daily  losartan 100 milliGRAM(s) Oral daily  metoprolol succinate ER 50 milliGRAM(s) Oral daily  ticagrelor 90 milliGRAM(s) Oral two times a day        acetaminophen     Tablet .. 650 milliGRAM(s) Oral every 6 hours PRN  LORazepam     Tablet 0.5 milliGRAM(s) Oral two times a day PRN  melatonin 3 milliGRAM(s) Oral at bedtime PRN  ondansetron Injectable 4 milliGRAM(s) IV Push every 8 hours PRN    aluminum hydroxide/magnesium hydroxide/simethicone Suspension 30 milliLiter(s) Oral every 4 hours PRN    atorvastatin 40 milliGRAM(s) Oral at bedtime        FAMILY HISTORY:      Non-contributory    SOCIAL HISTORY:    [ ] Tobacco  [ ] Drugs  [ ] Alcohol    Allergies    Wellbutrin (Swelling)    Intolerances    	    REVIEW OF SYSTEMS:  CONSTITUTIONAL: No fever  EYES: No eye pain, visual disturbances, or discharge  ENMT:  No difficulty hearing, tinnitus  NECK: No pain or stiffness  RESPIRATORY: No cough, wheezing,  CARDIOVASCULAR: No chest pain, palpitations, passing out, dizziness, or leg swelling  GASTROINTESTINAL:  No nausea, vomiting, diarrhea or constipation. No melena.  GENITOURINARY: No dysuria, hematuria  NEUROLOGICAL: No stroke like symptoms  SKIN: No burning or lesions   ENDOCRINE: No heat or cold intolerance  MUSCULOSKELETAL: No joint pain or swelling  PSYCHIATRIC: No  anxiety, mood swings  HEME/LYMPH: No bleeding gums  ALLERGY AND IMMUNOLOGIC: No hives or eczema	    All other ROS negative    PHYSICAL EXAM:  T(C): 36.9 (11-22-21 @ 19:41), Max: 36.9 (11-22-21 @ 19:41)  HR: 73 (11-22-21 @ 19:41) (62 - 80)  BP: 148/88 (11-22-21 @ 19:41) (143/94 - 168/106)  RR: 16 (11-22-21 @ 19:41) (16 - 20)  SpO2: 100% (11-22-21 @ 19:41) (98% - 100%)  Wt(kg): --  I&O's Summary      Appearance: Normal	  HEENT:   Normal oral mucosa, EOMI	  Cardiovascular:  S1 S2, No JVD,    Respiratory: Lungs clear to auscultation	  Psychiatry: Alert  Gastrointestinal:  Soft, Non-tender, + BS	  Skin: No rashes   Neurologic: Non-focal  Extremities:  No edema  Vascular: Peripheral pulses palpable    	    	  	  CARDIAC MARKERS:  Labs personally reviewed by me                                  12.9   9.23  )-----------( 222      ( 22 Nov 2021 04:36 )             41.6     11-22    138  |  106  |  23  ----------------------------<  100<H>  4.2   |  21<L>  |  0.74    Ca    9.3      22 Nov 2021 04:36    TPro  6.5  /  Alb  3.9  /  TBili  <0.2  /  DBili  x   /  AST  18  /  ALT  17  /  AlkPhos  75  11-22          EKG: Personally reviewed by me - NSR with first degree AVB  Radiology: Personally reviewed by me - CXR clear lungs    Echo - recovery of EF, now normal         Assessment/Plan:  · Assessment	  45F occassional  smoker, recent AWSTEMI in 12/2020 s/p PCI to LAD (PCI to prox and mid; has 50% lesion in mRCA that was not intervened on) presenting with  back pain for one day.    Problem/Plan - 1:  ·  Problem: Chest pain.   ·  Plan: Cardiology following   Trop=<6 -->  <6   Ekg:SR @ 59 bpm Flipped Ts V1- V2 - similar to prior EKG   CXR: BRADLEY  asa, Brilinta, metoprolol  losartan, Lipitor  Echo - recovery of EF, now normal  Stress test normal    Problem/Plan - 2:  ·  Problem: CAD (coronary artery disease).   ·  Plan: - Continue with aspirin and Brilinta.    Problem/Plan - 3:  ·  Problem: Hypertension.   ·  Plan: low salt, low fat, low cholesterol.  - Continue with Metoprolol , losartan.    Problem/Plan - 4:  ·  Problem: Hyperlipidemia.   ·  Plan: lipitor.    Problem/Plan - 5:  ·  Problem: Anxiety.   ·  Plan: - Ativan PRN.    Problem/Plan - 6:  ·  Problem: Need for prophylactic measure.   ·  Plan: DASH diet  Lovenox.    I had a long discussion with pt discussing the above multiple times. I answered all questions multiple times until she expressed understaning      Differential diagnosis and plan of care discussed with patient after the evaluation. Counseling on diet, nutritional counseling, weight management, exercise and medication compliance was done.   Advanced care planning/advanced directives discussed with patient/family. DNR status including forceful chest compressions to attempt to restart the heart, ventilator support/artificial breathing, electric shock, artificial nutrition, health care proxy, Molst form all discussed with pt. Pt wishes to consider. More than fifteen minutes spent on discussing advanced directives.          Yunior Giang DO [facility]  Cardiovascular Medicine  [address] [place] [address], [address]  Office [phone]  Cell [phone]

## 2021-11-22 NOTE — ED ADULT NURSE NOTE - OBJECTIVE STATEMENT
Pt received to RM 6: A&Ox4, c/o intermittent chest pain that radiates to middle back. Pt with PMHX of MI with stents x 2 (12/20), HTN, and a family Hx of heart disease. Respirations are even and unlabored, sating at 100% on RA, vitals per flow sheet, and NSR on cardiac monitor. 20 G to L AC placed and labs sent. Pt received to RM 6: A&Ox4, c/o intermittent chest pain that radiates to middle back. Pt with PMHX of MI with stents x 2 (12/20), HTN, and a family Hx of heart disease. Pt denies dizziness, weakness, SOB, N+V at the present. Pt states she thinks symptoms may be related to hx of anxiety. Respirations are even and unlabored, sating at 100% on RA, vitals stable, and NSR on cardiac monitor. 20 G to L AC placed and labs sent. MD evaluation in progress.

## 2021-11-22 NOTE — H&P ADULT - NSHPLABSRESULTS_GEN_ALL_CORE
LABS:  CAPILLARY BLOOD GLUCOSE                                12.9   9.23  )-----------( 222      ( 22 Nov 2021 04:36 )             41.6     11-22    138  |  106  |  23  ----------------------------<  100<H>  4.2   |  21<L>  |  0.74    Ca    9.3      22 Nov 2021 04:36    TPro  6.5  /  Alb  3.9  /  TBili  <0.2  /  DBili  x   /  AST  18  /  ALT  17  /  AlkPhos  75  11-22    PT/INR - ( 22 Nov 2021 04:36 )   PT: 12.0 sec;   INR: 1.06 ratio         PTT - ( 22 Nov 2021 04:36 )  PTT:31.4 sec            RADIOLOGY & ADDITIONAL TESTS:    Telemetry Personally Reviewed:    ECG Personally Reviewed:    Imaging Personally Reviewed:    Imaging Reviewed: < from: Xray Chest 2 Views PA/Lat (11.22.21 @ 04:20) >    IMPRESSION:  Clear lungs.    < end of copied text >        Consultant(s) Notes Reviewed:      Care Discussed with Consultants/Other Providers:

## 2021-11-22 NOTE — ED PROVIDER NOTE - OBJECTIVE STATEMENT
45 y/o F with PMH HTN, HLD, CAD s/p stent presenting to the ED c/o back and chest discomfort. States around 8pm she felt onset of pain between her shoulder blades, she tried taking tylenol and going to sleep but woke up with persistent pain and some chest discomfort. States the pain is similar to when she had an MI and her stents placed in 2020. Reports now mostly resolved. She is compliant with her asa/brilinta regimen. She denies any shortness of breath. No N/V fever or chills. Endorses occasional smoking.

## 2021-11-23 ENCOUNTER — TRANSCRIPTION ENCOUNTER (OUTPATIENT)
Age: 46
End: 2021-11-23

## 2021-11-23 VITALS
SYSTOLIC BLOOD PRESSURE: 141 MMHG | TEMPERATURE: 98 F | RESPIRATION RATE: 18 BRPM | HEART RATE: 67 BPM | OXYGEN SATURATION: 100 % | DIASTOLIC BLOOD PRESSURE: 99 MMHG

## 2021-11-23 LAB
A1C WITH ESTIMATED AVERAGE GLUCOSE RESULT: 5.6 % — SIGNIFICANT CHANGE UP (ref 4–5.6)
ALBUMIN SERPL ELPH-MCNC: 4.3 G/DL — SIGNIFICANT CHANGE UP (ref 3.3–5)
ALP SERPL-CCNC: 68 U/L — SIGNIFICANT CHANGE UP (ref 40–120)
ALT FLD-CCNC: 18 U/L — SIGNIFICANT CHANGE UP (ref 4–33)
ANION GAP SERPL CALC-SCNC: 13 MMOL/L — SIGNIFICANT CHANGE UP (ref 7–14)
AST SERPL-CCNC: 10 U/L — SIGNIFICANT CHANGE UP (ref 4–32)
BILIRUB SERPL-MCNC: 0.6 MG/DL — SIGNIFICANT CHANGE UP (ref 0.2–1.2)
BUN SERPL-MCNC: 13 MG/DL — SIGNIFICANT CHANGE UP (ref 7–23)
CALCIUM SERPL-MCNC: 9.8 MG/DL — SIGNIFICANT CHANGE UP (ref 8.4–10.5)
CHLORIDE SERPL-SCNC: 104 MMOL/L — SIGNIFICANT CHANGE UP (ref 98–107)
CHOLEST SERPL-MCNC: 139 MG/DL — SIGNIFICANT CHANGE UP
CO2 SERPL-SCNC: 24 MMOL/L — SIGNIFICANT CHANGE UP (ref 22–31)
COVID-19 NUCLEOCAPSID GAM AB INTERP: POSITIVE
COVID-19 NUCLEOCAPSID TOTAL GAM ANTIBODY RESULT: 3.78 INDEX — HIGH
COVID-19 SPIKE DOMAIN AB INTERP: POSITIVE
COVID-19 SPIKE DOMAIN ANTIBODY RESULT: 195 U/ML — HIGH
CREAT SERPL-MCNC: 0.58 MG/DL — SIGNIFICANT CHANGE UP (ref 0.5–1.3)
ESTIMATED AVERAGE GLUCOSE: 114 — SIGNIFICANT CHANGE UP
GLUCOSE SERPL-MCNC: 103 MG/DL — HIGH (ref 70–99)
HCT VFR BLD CALC: 42.7 % — SIGNIFICANT CHANGE UP (ref 34.5–45)
HDLC SERPL-MCNC: 47 MG/DL — LOW
HGB BLD-MCNC: 13.8 G/DL — SIGNIFICANT CHANGE UP (ref 11.5–15.5)
LIPID PNL WITH DIRECT LDL SERPL: 73 MG/DL — SIGNIFICANT CHANGE UP
MAGNESIUM SERPL-MCNC: 1.9 MG/DL — SIGNIFICANT CHANGE UP (ref 1.6–2.6)
MCHC RBC-ENTMCNC: 28 PG — SIGNIFICANT CHANGE UP (ref 27–34)
MCHC RBC-ENTMCNC: 32.3 GM/DL — SIGNIFICANT CHANGE UP (ref 32–36)
MCV RBC AUTO: 86.6 FL — SIGNIFICANT CHANGE UP (ref 80–100)
NON HDL CHOLESTEROL: 92 MG/DL — SIGNIFICANT CHANGE UP
NRBC # BLD: 0 /100 WBCS — SIGNIFICANT CHANGE UP
NRBC # FLD: 0 K/UL — SIGNIFICANT CHANGE UP
PHOSPHATE SERPL-MCNC: 3.6 MG/DL — SIGNIFICANT CHANGE UP (ref 2.5–4.5)
PLATELET # BLD AUTO: 232 K/UL — SIGNIFICANT CHANGE UP (ref 150–400)
POTASSIUM SERPL-MCNC: 3.9 MMOL/L — SIGNIFICANT CHANGE UP (ref 3.5–5.3)
POTASSIUM SERPL-SCNC: 3.9 MMOL/L — SIGNIFICANT CHANGE UP (ref 3.5–5.3)
PROT SERPL-MCNC: 7.1 G/DL — SIGNIFICANT CHANGE UP (ref 6–8.3)
RBC # BLD: 4.93 M/UL — SIGNIFICANT CHANGE UP (ref 3.8–5.2)
RBC # FLD: 13.4 % — SIGNIFICANT CHANGE UP (ref 10.3–14.5)
SARS-COV-2 IGG+IGM SERPL QL IA: 195 U/ML — HIGH
SARS-COV-2 IGG+IGM SERPL QL IA: 3.78 INDEX — HIGH
SARS-COV-2 IGG+IGM SERPL QL IA: POSITIVE
SARS-COV-2 IGG+IGM SERPL QL IA: POSITIVE
SODIUM SERPL-SCNC: 141 MMOL/L — SIGNIFICANT CHANGE UP (ref 135–145)
TRIGL SERPL-MCNC: 93 MG/DL — SIGNIFICANT CHANGE UP
WBC # BLD: 9.54 K/UL — SIGNIFICANT CHANGE UP (ref 3.8–10.5)
WBC # FLD AUTO: 9.54 K/UL — SIGNIFICANT CHANGE UP (ref 3.8–10.5)

## 2021-11-23 PROCEDURE — 93018 CV STRESS TEST I&R ONLY: CPT | Mod: GC

## 2021-11-23 PROCEDURE — 93016 CV STRESS TEST SUPVJ ONLY: CPT | Mod: GC

## 2021-11-23 RX ORDER — FUROSEMIDE 40 MG
1 TABLET ORAL
Qty: 0 | Refills: 0 | DISCHARGE

## 2021-11-23 RX ORDER — METOPROLOL TARTRATE 50 MG
1 TABLET ORAL
Qty: 0 | Refills: 0 | DISCHARGE

## 2021-11-23 RX ORDER — ATORVASTATIN CALCIUM 80 MG/1
1 TABLET, FILM COATED ORAL
Qty: 0 | Refills: 0 | DISCHARGE

## 2021-11-23 RX ORDER — POTASSIUM CHLORIDE 20 MEQ
1 PACKET (EA) ORAL
Qty: 30 | Refills: 0
Start: 2021-11-23 | End: 2021-12-22

## 2021-11-23 RX ORDER — LOSARTAN POTASSIUM 100 MG/1
1 TABLET, FILM COATED ORAL
Qty: 0 | Refills: 0 | DISCHARGE

## 2021-11-23 RX ORDER — METOPROLOL TARTRATE 50 MG
1 TABLET ORAL
Qty: 0 | Refills: 0 | DISCHARGE
Start: 2021-11-23

## 2021-11-23 RX ORDER — TICAGRELOR 90 MG/1
1 TABLET ORAL
Qty: 0 | Refills: 0 | DISCHARGE
Start: 2021-11-23

## 2021-11-23 RX ORDER — HYDROCHLOROTHIAZIDE 25 MG
1 TABLET ORAL
Qty: 30 | Refills: 0
Start: 2021-11-23 | End: 2021-12-22

## 2021-11-23 RX ORDER — LANOLIN ALCOHOL/MO/W.PET/CERES
1 CREAM (GRAM) TOPICAL
Qty: 0 | Refills: 0 | DISCHARGE

## 2021-11-23 RX ORDER — ASPIRIN/CALCIUM CARB/MAGNESIUM 324 MG
1 TABLET ORAL
Qty: 0 | Refills: 0 | DISCHARGE
Start: 2021-11-23

## 2021-11-23 RX ORDER — ATORVASTATIN CALCIUM 80 MG/1
1 TABLET, FILM COATED ORAL
Qty: 0 | Refills: 0 | DISCHARGE
Start: 2021-11-23

## 2021-11-23 RX ORDER — MULTIVIT-MIN/FERROUS GLUCONATE 9 MG/15 ML
1 LIQUID (ML) ORAL
Qty: 0 | Refills: 0 | DISCHARGE

## 2021-11-23 RX ORDER — TICAGRELOR 90 MG/1
1 TABLET ORAL
Qty: 0 | Refills: 0 | DISCHARGE

## 2021-11-23 RX ORDER — LOSARTAN POTASSIUM 100 MG/1
1 TABLET, FILM COATED ORAL
Qty: 0 | Refills: 0 | DISCHARGE
Start: 2021-11-23

## 2021-11-23 RX ORDER — ASPIRIN/CALCIUM CARB/MAGNESIUM 324 MG
1 TABLET ORAL
Qty: 0 | Refills: 0 | DISCHARGE

## 2021-11-23 RX ORDER — LANOLIN ALCOHOL/MO/W.PET/CERES
3 CREAM (GRAM) TOPICAL
Qty: 0 | Refills: 0 | DISCHARGE
Start: 2021-11-23

## 2021-11-23 RX ORDER — ACETAMINOPHEN 500 MG
2 TABLET ORAL
Qty: 0 | Refills: 0 | DISCHARGE
Start: 2021-11-23

## 2021-11-23 RX ORDER — POTASSIUM CHLORIDE 20 MEQ
2 PACKET (EA) ORAL
Qty: 0 | Refills: 0 | DISCHARGE
Start: 2021-11-23 | End: 2021-12-22

## 2021-11-23 RX ADMIN — LOSARTAN POTASSIUM 100 MILLIGRAM(S): 100 TABLET, FILM COATED ORAL at 10:02

## 2021-11-23 RX ADMIN — TICAGRELOR 90 MILLIGRAM(S): 90 TABLET ORAL at 10:01

## 2021-11-23 RX ADMIN — Medication 81 MILLIGRAM(S): at 10:02

## 2021-11-23 RX ADMIN — Medication 0.5 MILLIGRAM(S): at 15:19

## 2021-11-23 NOTE — DISCHARGE NOTE NURSING/CASE MANAGEMENT/SOCIAL WORK - PATIENT PORTAL LINK FT
You can access the FollowMyHealth Patient Portal offered by [facility] by registering at the following website: [email or web address]. By joining [facility]’s FollowMyHealth portal, you will also be able to view your health information using other applications (apps) compatible with our system.

## 2021-11-23 NOTE — DISCHARGE NOTE PROVIDER - NSDCMRMEDTOKEN_GEN_ALL_CORE_FT
acetaminophen 325 mg oral tablet: 2 tab(s) orally every 6 hours, As needed, Temp greater or equal to 38C (100.4F), Mild Pain (1 - 3)  aspirin 81 mg oral tablet, chewable: 1 tab(s) orally once a day  atorvastatin 40 mg oral tablet: 1 tab(s) orally once a day (at bedtime)  BMP: 2 x week - results to PMD  to discuss   potassium values  hydroCHLOROthiazide 25 mg oral tablet: 1 tab(s) orally once a day  K-Tab 20 mEq oral tablet, extended release: 1 tab(s) orally once a day   LORazepam 0.5 mg oral tablet: 1 tab(s) orally 2 times a day, As Needed  losartan 100 mg oral tablet: 1 tab(s) orally once a day  melatonin 3 mg oral tablet: 3 tab(s) orally once a day (at bedtime), As needed, Insomnia  metoprolol succinate 50 mg oral tablet, extended release: 1 tab(s) orally once a day  Multiple Vitamins oral gum: 1 gum orally once a day LD 8/13/2021  ticagrelor 90 mg oral tablet: 1 tab(s) orally 2 times a day

## 2021-11-23 NOTE — DISCHARGE NOTE NURSING/CASE MANAGEMENT/SOCIAL WORK - NSDCPEWEB_GEN_ALL_CORE
[facility] for Tobacco Control website --- [email or web address] website --- [email or web address]

## 2021-11-23 NOTE — DISCHARGE NOTE PROVIDER - HOSPITAL COURSE
Patient is a 45 year old Female  active smoker, recent AWSTEMI in 12/2020 s/p PCI to LAD (PCI to prox and mid; has 50% lesion in mRCA that was not intervened on) presenting with  back pain for one day. She reports that night before presentation around  8pm she felt onset of pain between her shoulder blades, she tried taking Tylenol and going to sleep but woke up with persistent pain and some chest discomfort. Pain is similar to when she had an MI and her stents placed in 2020. On current exam, patient reports feeling back to baseline. She is compliant with all medications. She was recently prescribed ativan and was wondering if this could be related to a panic attack. She denies any shortness of breath. No N/V fever or chills. Endorses occasional smoking. Of note, patient presented in February with similar concerns. On feb 26, 2021, pt  underwent cath, showed patent vessels and non-obstructive CAD.     ·  Problem: Chest pain.   ·  Plan: Cardiology following   Trop=<6 -->  <6   Ekg:SR @ 59 bpm Flipped Ts V1- V2 - similar to prior EKG   CXR: NAD   continue asa, Brilinta, metoprolol  losartan, Lipitor  Echo - recovery of EF, now normal  Stress test normal     ·  Problem: CAD (coronary artery disease).  ·  Plan: - Continue with aspirin and Brilinta.     ·  Problem: Hypertension.  ·  Plan: low salt, low fat, low cholesterol.  - Continue with Metoprolol , losartan.    ·  Problem: Hyperlipidemia.  ·  Plan: lipitor.     ·  Problem: Anxiety.  ·  Plan: - Ativan PRN.

## 2021-11-23 NOTE — DISCHARGE NOTE NURSING/CASE MANAGEMENT/SOCIAL WORK - NSDCPEFALRISK_GEN_ALL_CORE
For information on Fall & injury Prevention, visit [email or web address] no syncope/no vomiting/no chills/no shortness of breath/no nausea/no diaphoresis/no dizziness

## 2021-11-23 NOTE — DISCHARGE NOTE NURSING/CASE MANAGEMENT/SOCIAL WORK - NSDCPEEMAIL_GEN_ALL_CORE
[facility] for Tobacco Control email [email or web address]

## 2021-11-23 NOTE — PATIENT PROFILE ADULT - PACKS YRS CALCULATION
Hourly rounds completed this shift. No complaints throughout the night. All needs met at this time. Bed low/locked. Call light within reach. Will continue to monitor and give bedside report to oncoming nurse. 25

## 2021-11-23 NOTE — DISCHARGE NOTE NURSING/CASE MANAGEMENT/SOCIAL WORK - NSTRANSFERBELONGINGSRESP_GEN_A_NUR
yes Chonodrocutaneous Helical Advancement Flap Text: The defect edges were debeveled with a #15 scalpel blade.  Given the location of the defect and the proximity to free margins a chondrocutaneous helical advancement flap was deemed most appropriate.  Using a sterile surgical marker, the appropriate advancement flap was drawn incorporating the defect and placing the expected incisions within the relaxed skin tension lines where possible.    The area thus outlined was incised deep to adipose tissue with a #15 scalpel blade.  The skin margins were undermined to an appropriate distance in all directions utilizing iris scissors.

## 2021-11-23 NOTE — DISCHARGE NOTE PROVIDER - NSDCCPCAREPLAN_GEN_ALL_CORE_FT
PRINCIPAL DISCHARGE DIAGNOSIS  Diagnosis: Chest pain  Assessment and Plan of Treatment: Continue medications and do not stop unless instructed by your physician.  Continue low salt, fat, cholesterol  diet. Follow up with cardiologist and primary care physician's routine appointment.

## 2021-11-24 ENCOUNTER — NON-APPOINTMENT (OUTPATIENT)
Age: 46
End: 2021-11-24

## 2021-12-02 ENCOUNTER — NON-APPOINTMENT (OUTPATIENT)
Age: 46
End: 2021-12-02

## 2021-12-02 ENCOUNTER — APPOINTMENT (OUTPATIENT)
Dept: INTERNAL MEDICINE | Facility: CLINIC | Age: 46
End: 2021-12-02
Payer: COMMERCIAL

## 2021-12-02 VITALS
DIASTOLIC BLOOD PRESSURE: 80 MMHG | HEART RATE: 72 BPM | OXYGEN SATURATION: 98 % | WEIGHT: 218 LBS | RESPIRATION RATE: 16 BRPM | SYSTOLIC BLOOD PRESSURE: 130 MMHG | BODY MASS INDEX: 34.14 KG/M2

## 2021-12-02 VITALS
WEIGHT: 218 LBS | HEIGHT: 67 IN | OXYGEN SATURATION: 98 % | HEART RATE: 72 BPM | SYSTOLIC BLOOD PRESSURE: 130 MMHG | RESPIRATION RATE: 16 BRPM | DIASTOLIC BLOOD PRESSURE: 80 MMHG | BODY MASS INDEX: 34.21 KG/M2

## 2021-12-02 DIAGNOSIS — Z78.9 OTHER SPECIFIED HEALTH STATUS: ICD-10-CM

## 2021-12-02 PROCEDURE — 99495 TRANSJ CARE MGMT MOD F2F 14D: CPT | Mod: 25

## 2021-12-02 PROCEDURE — 99406 BEHAV CHNG SMOKING 3-10 MIN: CPT

## 2021-12-02 PROCEDURE — 93000 ELECTROCARDIOGRAM COMPLETE: CPT

## 2021-12-02 RX ORDER — CIPROFLOXACIN HYDROCHLORIDE 500 MG/1
500 TABLET, FILM COATED ORAL DAILY
Qty: 3 | Refills: 0 | Status: DISCONTINUED | COMMUNITY
Start: 2021-08-12 | End: 2021-12-02

## 2021-12-02 NOTE — PHYSICAL EXAM
[No Acute Distress] : no acute distress [Well Nourished] : well nourished [Well Developed] : well developed [Well-Appearing] : well-appearing [Normal Voice/Communication] : normal voice/communication [Normal Sclera/Conjunctiva] : normal sclera/conjunctiva [PERRL] : pupils equal round and reactive to light [EOMI] : extraocular movements intact [Normal Outer Ear/Nose] : the outer ears and nose were normal in appearance [Normal Oropharynx] : the oropharynx was normal [No JVD] : no jugular venous distention [No Lymphadenopathy] : no lymphadenopathy [Supple] : supple [Thyroid Normal, No Nodules] : the thyroid was normal and there were no nodules present [No Respiratory Distress] : no respiratory distress  [No Accessory Muscle Use] : no accessory muscle use [Clear to Auscultation] : lungs were clear to auscultation bilaterally [Normal Rate] : normal rate  [Regular Rhythm] : with a regular rhythm [Normal S1, S2] : normal S1 and S2 [No Murmur] : no murmur heard [No Carotid Bruits] : no carotid bruits [No Abdominal Bruit] : a ~M bruit was not heard ~T in the abdomen [Pedal Pulses Present] : the pedal pulses are present [No Edema] : there was no peripheral edema [No Palpable Aorta] : no palpable aorta [Soft] : abdomen soft [Non Tender] : non-tender [Non-distended] : non-distended [No Masses] : no abdominal mass palpated [No HSM] : no HSM [Normal Bowel Sounds] : normal bowel sounds [Normal Posterior Cervical Nodes] : no posterior cervical lymphadenopathy [Normal Anterior Cervical Nodes] : no anterior cervical lymphadenopathy [No CVA Tenderness] : no CVA  tenderness [No Spinal Tenderness] : no spinal tenderness [No Joint Swelling] : no joint swelling [Grossly Normal Strength/Tone] : grossly normal strength/tone [No Rash] : no rash [Coordination Grossly Intact] : coordination grossly intact [No Focal Deficits] : no focal deficits [Normal Gait] : normal gait [Speech Grossly Normal] : speech grossly normal [Memory Grossly Normal] : memory grossly normal [Normal Affect] : the affect was normal [Alert and Oriented x3] : oriented to person, place, and time [Normal Mood] : the mood was normal [Normal Insight/Judgement] : insight and judgment were intact [16428 - Moderate Complexity requires multiple possible diagnoses and/or the management options, moderate complexity of the medical data (tests, etc.) to be reviewed, and moderate risk of significant complications, morbidity, and/or mortality as well as co] : Moderate Complexity [de-identified] : obese [de-identified] : rt upper costochondral point tenderness.

## 2021-12-02 NOTE — HEALTH RISK ASSESSMENT
[] : Yes [Yes] : Yes [2 - 3 times a week (3 pts)] : 2 - 3  times a week (3 points) [3 or 4 (1 pt)] : 3 or 4  (1 point) [Never (0 pts)] : Never (0 points) [No] : In the past 12 months have you used drugs other than those required for medical reasons? No [Other reason not done] : Other reason not done [I have developed a follow-up plan documented below in the note.] : I have developed a follow-up plan documented below in the note. [Audit-CScore] : 4 [de-identified] : with anxiety - asking for rx.

## 2021-12-27 ENCOUNTER — RX RENEWAL (OUTPATIENT)
Age: 46
End: 2021-12-27

## 2022-01-03 ENCOUNTER — NON-APPOINTMENT (OUTPATIENT)
Age: 47
End: 2022-01-03

## 2022-01-10 ENCOUNTER — RX RENEWAL (OUTPATIENT)
Age: 47
End: 2022-01-10

## 2022-01-31 ENCOUNTER — APPOINTMENT (OUTPATIENT)
Dept: CARDIOLOGY | Facility: CLINIC | Age: 47
End: 2022-01-31

## 2022-03-03 ENCOUNTER — APPOINTMENT (OUTPATIENT)
Dept: INTERNAL MEDICINE | Facility: CLINIC | Age: 47
End: 2022-03-03
Payer: COMMERCIAL

## 2022-03-03 VITALS
BODY MASS INDEX: 36.1 KG/M2 | HEIGHT: 67 IN | DIASTOLIC BLOOD PRESSURE: 85 MMHG | HEART RATE: 69 BPM | WEIGHT: 230 LBS | SYSTOLIC BLOOD PRESSURE: 120 MMHG | OXYGEN SATURATION: 98 %

## 2022-03-03 DIAGNOSIS — R07.9 CHEST PAIN, UNSPECIFIED: ICD-10-CM

## 2022-03-03 PROCEDURE — 99214 OFFICE O/P EST MOD 30 MIN: CPT | Mod: 25

## 2022-03-03 PROCEDURE — 99406 BEHAV CHNG SMOKING 3-10 MIN: CPT

## 2022-03-03 RX ORDER — TICAGRELOR 90 MG/1
90 TABLET ORAL
Qty: 180 | Refills: 3 | Status: DISCONTINUED | COMMUNITY
End: 2022-03-03

## 2022-03-03 RX ORDER — ESCITALOPRAM OXALATE 10 MG/1
10 TABLET ORAL
Qty: 30 | Refills: 2 | Status: DISCONTINUED | COMMUNITY
Start: 2021-12-02 | End: 2022-03-03

## 2022-03-03 RX ORDER — FUROSEMIDE 20 MG/1
20 TABLET ORAL
Qty: 90 | Refills: 1 | Status: DISCONTINUED | COMMUNITY
Start: 2021-01-19 | End: 2022-03-03

## 2022-03-03 RX ORDER — PANTOPRAZOLE 40 MG/1
40 TABLET, DELAYED RELEASE ORAL
Qty: 90 | Refills: 3 | Status: DISCONTINUED | COMMUNITY
End: 2022-03-03

## 2022-03-03 RX ORDER — POTASSIUM CHLORIDE 1500 MG/1
20 TABLET, EXTENDED RELEASE ORAL
Qty: 30 | Refills: 2 | Status: ACTIVE | COMMUNITY
Start: 2022-03-03

## 2022-03-03 RX ORDER — LOSARTAN POTASSIUM 100 MG/1
100 TABLET, FILM COATED ORAL
Qty: 90 | Refills: 3 | Status: DISCONTINUED | COMMUNITY
End: 2022-03-03

## 2022-03-03 NOTE — HISTORY OF PRESENT ILLNESS
[FreeTextEntry1] : HTN, HLD, CAD, ovarian cyst, anx/dep, insomnia [de-identified] : 45 year old woman s/p admission to [facility] with AWMI ans SP PCI x 2 to LAD with resultant moderate to severe LV dysfunction. Also with hx HTn, HLD, ovarian cyst, anx with depression, insomnia. \par s/p Robotic RSO, extensive lysis of adhesions, resection of peritoneal nodule\par s/p admission 11/22/21 - 11/23/21 with chest pains. Reports that the pain started in the back. she was seen by cardiology - cxr neg, echo with improved EF, diastolic dysfunction, Stress okay. CBC nl, Cr 0.58, A1c 5.6, lft nl, chol 139, trig 93, HDL 47, LDL 73. no palpitations, dyspnea. with occ dizziness attributed to the diuretic. diuretic changed to HCTZ and potassium added. Reports sx improved, but has returned with occ rt and lt medial chest pains. + some pains under the breasts. No current CP. no cough or wheezing. no fevers. Pain triggered/exacerbated by sitting and thinkinbg about the pain. Better with moving around and doing things. + some fatigue. \par \par Cardiology - to see Dr Giang - [phone]  meds have been changed since the hospital - on losartan/hctz, potassium, lasix stopped, brilinta was stopped.  \par Has been following with cardiac rehab - on hold\par She is an active smoker - was cutting down, trying to quit. Will be using the patch. Has been good most days. some cutting down - Now rare tob.\par to cut down on the drinking - has been better\par Has been walking daily. Has been following diet.\par Taking meds w/o issues. \par No cv sx. Had been exercising at cardiac rehab - treadmill, bike, hand bike and rower. Has been using light weights. Has been going for walks\par no GI sx. no pain, nausea, vomiting diarrhea, constipation. \par no noted neuro sx. \par Taking melatonin as needed.\par SAw GI - had cologaurd 8/21\par Reports that she has been having anxiety - Denies depression. Has been improved since she stopped working.  Applying for disability. did not like the way the ssri made her feel.  notr taking. Has been taking the benzo as needed - Last rx lasted ~ 3 mo. \par Has been having some pains at the left shoulder.  \par \par vaccines - reported as up to date\par \par Mammo - in the past year.\par gyn- Dr Anderson, changed to Dr Murray\par ophtho - due.

## 2022-03-03 NOTE — HEALTH RISK ASSESSMENT
[Current] : Current [Yes] : Yes [2 - 3 times a week (3 pts)] : 2 - 3  times a week (3 points) [3 or 4 (1 pt)] : 3 or 4  (1 point) [Never (0 pts)] : Never (0 points) [No] : In the past 12 months have you used drugs other than those required for medical reasons? No [0] : 2) Feeling down, depressed, or hopeless: Not at all (0) [PHQ-2 Negative - No further assessment needed] : PHQ-2 Negative - No further assessment needed [Audit-CScore] : 4 [[id number]] : 0

## 2022-03-03 NOTE — COUNSELING
[Potential consequences of obesity discussed] : Potential consequences of obesity discussed [Benefits of weight loss discussed] : Benefits of weight loss discussed [Encouraged to increase physical activity] : Encouraged to increase physical activity [Decrease Portions] : decrease portions [None] : None [Good understanding] : Patient has a good understanding of lifestyle changes and steps needed to achieve self management goal [Cessation strategies including cessation program discussed] : Cessation strategies including cessation program discussed [Use of nicotine replacement therapies and other medications discussed] : Use of nicotine replacement therapies and other medications discussed [Encouraged to pick a quit date and identify support needed to quit] : Encouraged to pick a quit date and identify support needed to quit [Smoking Cessation Program Referral] : Smoking Cessation Program Referral  [Yes] : Willing to quit smoking [Behavioral health counseling provided] : Behavioral health counseling provided [Plan in advance] : Plan in advance [Engage in a relaxing activity] : Engage in a relaxing activity [FreeTextEntry1] : 4

## 2022-03-03 NOTE — PHYSICAL EXAM
[No Acute Distress] : no acute distress [Well Nourished] : well nourished [Well Developed] : well developed [Well-Appearing] : well-appearing [Normal Voice/Communication] : normal voice/communication [Normal Sclera/Conjunctiva] : normal sclera/conjunctiva [PERRL] : pupils equal round and reactive to light [EOMI] : extraocular movements intact [Normal Outer Ear/Nose] : the outer ears and nose were normal in appearance [Normal Oropharynx] : the oropharynx was normal [No JVD] : no jugular venous distention [No Lymphadenopathy] : no lymphadenopathy [Supple] : supple [Thyroid Normal, No Nodules] : the thyroid was normal and there were no nodules present [No Respiratory Distress] : no respiratory distress  [No Accessory Muscle Use] : no accessory muscle use [Clear to Auscultation] : lungs were clear to auscultation bilaterally [Normal Rate] : normal rate  [Regular Rhythm] : with a regular rhythm [Normal S1, S2] : normal S1 and S2 [No Murmur] : no murmur heard [No Carotid Bruits] : no carotid bruits [No Abdominal Bruit] : a ~M bruit was not heard ~T in the abdomen [Pedal Pulses Present] : the pedal pulses are present [No Edema] : there was no peripheral edema [No Palpable Aorta] : no palpable aorta [Soft] : abdomen soft [Non Tender] : non-tender [Non-distended] : non-distended [No Masses] : no abdominal mass palpated [No HSM] : no HSM [Normal Bowel Sounds] : normal bowel sounds [Normal Posterior Cervical Nodes] : no posterior cervical lymphadenopathy [Normal Anterior Cervical Nodes] : no anterior cervical lymphadenopathy [No CVA Tenderness] : no CVA  tenderness [No Spinal Tenderness] : no spinal tenderness [No Joint Swelling] : no joint swelling [Grossly Normal Strength/Tone] : grossly normal strength/tone [No Rash] : no rash [Coordination Grossly Intact] : coordination grossly intact [No Focal Deficits] : no focal deficits [Normal Gait] : normal gait [Speech Grossly Normal] : speech grossly normal [Memory Grossly Normal] : memory grossly normal [Normal Affect] : the affect was normal [Alert and Oriented x3] : oriented to person, place, and time [Normal Mood] : the mood was normal [Normal Insight/Judgement] : insight and judgment were intact [de-identified] : obese

## 2022-03-04 LAB
ALBUMIN SERPL ELPH-MCNC: 4.8 G/DL
ALP BLD-CCNC: 82 U/L
ALT SERPL-CCNC: 21 U/L
ANION GAP SERPL CALC-SCNC: 15 MMOL/L
AST SERPL-CCNC: 15 U/L
BASOPHILS # BLD AUTO: 0.08 K/UL
BASOPHILS NFR BLD AUTO: 1 %
BILIRUB SERPL-MCNC: 0.4 MG/DL
BUN SERPL-MCNC: 13 MG/DL
CALCIUM SERPL-MCNC: 9.7 MG/DL
CHLORIDE SERPL-SCNC: 102 MMOL/L
CHOLEST SERPL-MCNC: 151 MG/DL
CO2 SERPL-SCNC: 25 MMOL/L
CREAT SERPL-MCNC: 0.54 MG/DL
EGFR: 114 ML/MIN/1.73M2
EOSINOPHIL # BLD AUTO: 0.34 K/UL
EOSINOPHIL NFR BLD AUTO: 4.2 %
ESTIMATED AVERAGE GLUCOSE: 114 MG/DL
GLUCOSE SERPL-MCNC: 99 MG/DL
HBA1C MFR BLD HPLC: 5.6 %
HCT VFR BLD CALC: 43.6 %
HDLC SERPL-MCNC: 52 MG/DL
HGB BLD-MCNC: 13.7 G/DL
IMM GRANULOCYTES NFR BLD AUTO: 0.2 %
LDLC SERPL CALC-MCNC: 81 MG/DL
LYMPHOCYTES # BLD AUTO: 2.32 K/UL
LYMPHOCYTES NFR BLD AUTO: 28.5 %
MAGNESIUM SERPL-MCNC: 2.2 MG/DL
MAN DIFF?: NORMAL
MCHC RBC-ENTMCNC: 27.6 PG
MCHC RBC-ENTMCNC: 31.4 GM/DL
MCV RBC AUTO: 87.7 FL
MONOCYTES # BLD AUTO: 0.47 K/UL
MONOCYTES NFR BLD AUTO: 5.8 %
NEUTROPHILS # BLD AUTO: 4.92 K/UL
NEUTROPHILS NFR BLD AUTO: 60.3 %
NONHDLC SERPL-MCNC: 100 MG/DL
PLATELET # BLD AUTO: 262 K/UL
POTASSIUM SERPL-SCNC: 4 MMOL/L
PROT SERPL-MCNC: 7.3 G/DL
RBC # BLD: 4.97 M/UL
RBC # FLD: 13.9 %
SODIUM SERPL-SCNC: 142 MMOL/L
TRIGL SERPL-MCNC: 93 MG/DL
TSH SERPL-ACNC: 1.6 UIU/ML
WBC # FLD AUTO: 8.15 K/UL

## 2022-04-04 NOTE — ED PROVIDER NOTE - OBJECTIVE STATEMENT
Kay Shrestha MD: 46yo F with PMH PMH HTN, active smoker, CAD s/p THIAGO to pLAD and mLAD in 12/2020, EF 40% on asa and brilinta who presents with chest pain since last night. Pain is pressure in mid sternal area, radiating to b/l shoulder, upper back, exertional, associated with SOB, fatigue, similar to last visit when she got cardiac stents. 50% mRCA occlusion on recent C 12/3/21 with no PCI. No fever, diaphoresis, N/V, syncope, lightheadedness, irregular rhythm, palpitations, leg swelling or focal neuro deficit. Last EtOH drink on Monday. No hx of withdrawals. Took asa 81 this AM.     Cardiology: Fatuma <-------- Click here to INCLUDE CoVID-19 Discharge Instructions

## 2022-07-11 ENCOUNTER — APPOINTMENT (OUTPATIENT)
Dept: INTERNAL MEDICINE | Facility: CLINIC | Age: 47
End: 2022-07-11

## 2022-08-11 ENCOUNTER — APPOINTMENT (OUTPATIENT)
Dept: INTERNAL MEDICINE | Facility: CLINIC | Age: 47
End: 2022-08-11

## 2022-08-11 ENCOUNTER — NON-APPOINTMENT (OUTPATIENT)
Age: 47
End: 2022-08-11

## 2022-08-11 VITALS
RESPIRATION RATE: 16 BRPM | WEIGHT: 222 LBS | OXYGEN SATURATION: 98 % | BODY MASS INDEX: 34.84 KG/M2 | HEIGHT: 67 IN | HEART RATE: 74 BPM | DIASTOLIC BLOOD PRESSURE: 80 MMHG | SYSTOLIC BLOOD PRESSURE: 128 MMHG

## 2022-08-11 VITALS
BODY MASS INDEX: 34.84 KG/M2 | HEIGHT: 67 IN | HEART RATE: 74 BPM | RESPIRATION RATE: 16 BRPM | DIASTOLIC BLOOD PRESSURE: 80 MMHG | SYSTOLIC BLOOD PRESSURE: 128 MMHG | WEIGHT: 222 LBS | OXYGEN SATURATION: 98 %

## 2022-08-11 DIAGNOSIS — Z00.00 ENCOUNTER FOR GENERAL ADULT MEDICAL EXAMINATION W/OUT ABNORMAL FINDINGS: ICD-10-CM

## 2022-08-11 DIAGNOSIS — Z12.39 ENCOUNTER FOR OTHER SCREENING FOR MALIGNANT NEOPLASM OF BREAST: ICD-10-CM

## 2022-08-11 PROCEDURE — 93000 ELECTROCARDIOGRAM COMPLETE: CPT

## 2022-08-11 PROCEDURE — 99396 PREV VISIT EST AGE 40-64: CPT | Mod: 25

## 2022-08-11 PROCEDURE — 36415 COLL VENOUS BLD VENIPUNCTURE: CPT

## 2022-08-11 RX ORDER — LOSARTAN POTASSIUM 100 MG/1
100 TABLET, FILM COATED ORAL
Refills: 0 | Status: ACTIVE | COMMUNITY

## 2022-08-11 RX ORDER — LOSARTAN POTASSIUM AND HYDROCHLOROTHIAZIDE 25; 100 MG/1; MG/1
100-25 TABLET ORAL
Qty: 90 | Refills: 1 | Status: DISCONTINUED | COMMUNITY
Start: 2022-03-03 | End: 2022-08-11

## 2022-08-11 RX ORDER — POTASSIUM CHLORIDE 1500 MG/1
20 TABLET, FILM COATED, EXTENDED RELEASE ORAL
Qty: 60 | Refills: 0 | Status: ACTIVE | COMMUNITY
Start: 2022-06-20

## 2022-08-11 RX ORDER — METOPROLOL SUCCINATE 25 MG/1
25 TABLET, EXTENDED RELEASE ORAL
Qty: 1 | Refills: 3 | Status: DISCONTINUED | COMMUNITY
Start: 2021-03-16 | End: 2022-08-11

## 2022-08-11 NOTE — PHYSICAL EXAM
[No Acute Distress] : no acute distress [Well Nourished] : well nourished [Well Developed] : well developed [Well-Appearing] : well-appearing [Normal Voice/Communication] : normal voice/communication [Normal Sclera/Conjunctiva] : normal sclera/conjunctiva [PERRL] : pupils equal round and reactive to light [EOMI] : extraocular movements intact [Normal Outer Ear/Nose] : the outer ears and nose were normal in appearance [Normal TMs] : both tympanic membranes were normal [No JVD] : no jugular venous distention [No Lymphadenopathy] : no lymphadenopathy [Supple] : supple [Thyroid Normal, No Nodules] : the thyroid was normal and there were no nodules present [No Respiratory Distress] : no respiratory distress  [No Accessory Muscle Use] : no accessory muscle use [Clear to Auscultation] : lungs were clear to auscultation bilaterally [Normal Rate] : normal rate  [Regular Rhythm] : with a regular rhythm [Normal S1, S2] : normal S1 and S2 [No Murmur] : no murmur heard [No Carotid Bruits] : no carotid bruits [No Abdominal Bruit] : a ~M bruit was not heard ~T in the abdomen [Pedal Pulses Present] : the pedal pulses are present [No Edema] : there was no peripheral edema [No Palpable Aorta] : no palpable aorta [Soft] : abdomen soft [Non Tender] : non-tender [Non-distended] : non-distended [No Masses] : no abdominal mass palpated [No HSM] : no HSM [Normal Bowel Sounds] : normal bowel sounds [Normal Posterior Cervical Nodes] : no posterior cervical lymphadenopathy [Normal Anterior Cervical Nodes] : no anterior cervical lymphadenopathy [No CVA Tenderness] : no CVA  tenderness [No Spinal Tenderness] : no spinal tenderness [No Joint Swelling] : no joint swelling [Grossly Normal Strength/Tone] : grossly normal strength/tone [No Rash] : no rash [Coordination Grossly Intact] : coordination grossly intact [No Focal Deficits] : no focal deficits [Normal Gait] : normal gait [Speech Grossly Normal] : speech grossly normal [Memory Grossly Normal] : memory grossly normal [Normal Affect] : the affect was normal [Alert and Oriented x3] : oriented to person, place, and time [Normal Mood] : the mood was normal [Normal Insight/Judgement] : insight and judgment were intact [de-identified] : obese

## 2022-08-11 NOTE — HEALTH RISK ASSESSMENT
[Current] : Current [Yes] : Yes [Monthly or less (1 pt)] : Monthly or less (1 point) [1 or 2 (0 pts)] : 1 or 2 (0 points) [Never (0 pts)] : Never (0 points) [No] : In the past 12 months have you used drugs other than those required for medical reasons? No [de-identified] : changed to vaping [Audit-CScore] : 1 [ColonoscopyDate] : 8/21 [ColonoscopyComments] : cologaurd

## 2022-08-11 NOTE — HISTORY OF PRESENT ILLNESS
[FreeTextEntry1] : annual PE\par HTN, HLD, CAD, ovarian cyst, anx/dep, insomnia [de-identified] : 47 year old woman s/p admission to [facility] with AWMI ans SP PCI x 2 to LAD with resultant moderate to severe LV dysfunction. Also with hx HTn, HLD, ovarian cyst, anx with depression, insomnia. \par s/p Robotic RSO, extensive lysis of adhesions, resection of peritoneal nodule\par s/p admission 11/22/21 - 11/23/21 with chest pains. Reports that the pain started in the back. she was seen by cardiology - cxr neg, echo with improved EF, diastolic dysfunction, Stress okay. CBC nl, Cr 0.58, A1c 5.6, lft nl, chol 139, trig 93, HDL 47, LDL 73. no palpitations, dyspnea. with occ dizziness attributed to the diuretic. diuretic changed to HCTZ and potassium added. Reports sx improved, but has returned with occ rt and lt medial chest pains. + some pains under the breasts. No current CP. no cough or wheezing. no fevers. Pain triggered/exacerbated by sitting and thinking about the pain. Better with moving around and doing things. + some fatigue. \par \par Cardiology - Dr Giang - [phone] meds have been changed since the hospital - on losartan/hctz, potassium, lasix stopped, brilinta was stopped. \par completed cardiac rehab.\par She is an active smoker - was cutting down, trying to quit. Has changed to vaping - plans on quitting\par to cut down on the drinking - has been better - reports now rare\par Has been walking daily. Has been following diet.\par Taking meds w/o issues. \par No cv sx. \par no GI sx. no pain, nausea, vomiting diarrhea, constipation. \par no noted neuro sx. \par Taking melatonin as needed.\par SAw GI - had cologaurd 8/21\par Reports that she has been having anxiety - Denies depression. Has been improved since she stopped working. did not like the way the ssri made her feel. notr taking. Has been taking the benzo as needed - Has been lasting > 1 mo\par Has been having some pains at the left shoulder. \par \par vaccines - reported as up to date\par \par Mammo - due\par gyn- Dr Anderson, changed to Dr Murray\par ophtho - due.

## 2022-08-11 NOTE — COUNSELING
[Cessation strategies including cessation program discussed] : Cessation strategies including cessation program discussed [Use of nicotine replacement therapies and other medications discussed] : Use of nicotine replacement therapies and other medications discussed [Encouraged to pick a quit date and identify support needed to quit] : Encouraged to pick a quit date and identify support needed to quit [Smoking Cessation Program Referral] : Smoking Cessation Program Referral  [Yes] : Willing to quit smoking [AUDIT-C Screening administered and reviewed] : AUDIT-C Screening administered and reviewed [Hazards of at-risk alcohol use discussed] : Hazards of at-risk alcohol use discussed [Strategies to reduce or eliminate alcohol use discussed] : Strategies to reduce or eliminate alcohol use discussed [Support options provided] : Support options provided [Quit Drinking] : Quit Drinking [Potential consequences of obesity discussed] : Potential consequences of obesity discussed [Benefits of weight loss discussed] : Benefits of weight loss discussed [Encouraged to increase physical activity] : Encouraged to increase physical activity [Decrease Portions] : decrease portions [Good understanding] : Patient has a good understanding of disease, goals and obesity follow-up plan

## 2022-08-31 LAB
25(OH)D3 SERPL-MCNC: 48.9 NG/ML
ALBUMIN SERPL ELPH-MCNC: 4.6 G/DL
ALP BLD-CCNC: 78 U/L
ALT SERPL-CCNC: 20 U/L
ANION GAP SERPL CALC-SCNC: 14 MMOL/L
AST SERPL-CCNC: 14 U/L
BASOPHILS # BLD AUTO: 0.08 K/UL
BASOPHILS NFR BLD AUTO: 1.1 %
BILIRUB SERPL-MCNC: 0.6 MG/DL
BUN SERPL-MCNC: 8 MG/DL
CALCIUM SERPL-MCNC: 10 MG/DL
CHLORIDE SERPL-SCNC: 100 MMOL/L
CHOLEST SERPL-MCNC: 143 MG/DL
CO2 SERPL-SCNC: 28 MMOL/L
CREAT SERPL-MCNC: 0.57 MG/DL
EGFR: 111 ML/MIN/1.73M2
EOSINOPHIL # BLD AUTO: 0.29 K/UL
EOSINOPHIL NFR BLD AUTO: 4 %
ESTIMATED AVERAGE GLUCOSE: 123 MG/DL
GLUCOSE SERPL-MCNC: 93 MG/DL
HBA1C MFR BLD HPLC: 5.9 %
HCT VFR BLD CALC: 43.6 %
HDLC SERPL-MCNC: 46 MG/DL
HGB BLD-MCNC: 13.5 G/DL
IMM GRANULOCYTES NFR BLD AUTO: 0.1 %
IRON SATN MFR SERPL: 22 %
IRON SERPL-MCNC: 77 UG/DL
LDLC SERPL CALC-MCNC: 77 MG/DL
LYMPHOCYTES # BLD AUTO: 2.03 K/UL
LYMPHOCYTES NFR BLD AUTO: 28.2 %
MAGNESIUM SERPL-MCNC: 2.3 MG/DL
MAN DIFF?: NORMAL
MCHC RBC-ENTMCNC: 27.1 PG
MCHC RBC-ENTMCNC: 31 GM/DL
MCV RBC AUTO: 87.6 FL
MONOCYTES # BLD AUTO: 0.46 K/UL
MONOCYTES NFR BLD AUTO: 6.4 %
NEUTROPHILS # BLD AUTO: 4.34 K/UL
NEUTROPHILS NFR BLD AUTO: 60.2 %
NONHDLC SERPL-MCNC: 97 MG/DL
PLATELET # BLD AUTO: 271 K/UL
POTASSIUM SERPL-SCNC: 3.7 MMOL/L
PROT SERPL-MCNC: 6.9 G/DL
RBC # BLD: 4.98 M/UL
RBC # FLD: 14.7 %
SODIUM SERPL-SCNC: 141 MMOL/L
TIBC SERPL-MCNC: 346 UG/DL
TRIGL SERPL-MCNC: 99 MG/DL
TSH SERPL-ACNC: 1.5 UIU/ML
UIBC SERPL-MCNC: 269 UG/DL
WBC # FLD AUTO: 7.21 K/UL

## 2022-09-22 NOTE — DISCHARGE NOTE NURSING/CASE MANAGEMENT/SOCIAL WORK - NSDCPEPAMP_GEN_ALL_CORE
Received call from patient stating that his meds for discharge were not sent to his home pharmacy, [facility]. Writer called meds to beds to discuss issue. They will call [facility] to get the prescriptions transferred over there. Called patient back to update. “[facility]” pamphlet given

## 2022-09-24 ENCOUNTER — TRANSCRIPTION ENCOUNTER (OUTPATIENT)
Age: 47
End: 2022-09-24

## 2022-11-15 ENCOUNTER — APPOINTMENT (OUTPATIENT)
Dept: INTERNAL MEDICINE | Facility: CLINIC | Age: 47
End: 2022-11-15

## 2022-11-15 VITALS — DIASTOLIC BLOOD PRESSURE: 88 MMHG | SYSTOLIC BLOOD PRESSURE: 136 MMHG

## 2022-11-15 VITALS
HEART RATE: 71 BPM | BODY MASS INDEX: 35.47 KG/M2 | DIASTOLIC BLOOD PRESSURE: 82 MMHG | RESPIRATION RATE: 16 BRPM | HEIGHT: 67 IN | OXYGEN SATURATION: 98 % | WEIGHT: 226 LBS | SYSTOLIC BLOOD PRESSURE: 132 MMHG

## 2022-11-15 VITALS — SYSTOLIC BLOOD PRESSURE: 128 MMHG | DIASTOLIC BLOOD PRESSURE: 80 MMHG

## 2022-11-15 DIAGNOSIS — Z01.818 ENCOUNTER FOR OTHER PREPROCEDURAL EXAMINATION: ICD-10-CM

## 2022-11-15 DIAGNOSIS — Z48.89 ENCOUNTER FOR OTHER SPECIFIED SURGICAL AFTERCARE: ICD-10-CM

## 2022-11-15 DIAGNOSIS — Z76.89 PERSONS ENCOUNTERING HEALTH SERVICES IN OTHER SPECIFIED CIRCUMSTANCES: ICD-10-CM

## 2022-11-15 PROCEDURE — 99406 BEHAV CHNG SMOKING 3-10 MIN: CPT | Mod: 25

## 2022-11-15 PROCEDURE — G0008: CPT

## 2022-11-15 PROCEDURE — 90686 IIV4 VACC NO PRSV 0.5 ML IM: CPT

## 2022-11-15 PROCEDURE — 99214 OFFICE O/P EST MOD 30 MIN: CPT | Mod: 25

## 2022-11-15 NOTE — HISTORY OF PRESENT ILLNESS
[FreeTextEntry1] : HTN, HLD, CAD, ovarian cyst, anx/dep, insomnia [de-identified] : 47 year old woman s/p admission to [facility] with AWMI ans SP PCI x 2 to LAD with resultant moderate to severe LV dysfunction. Also with hx HTn, HLD, ovarian cyst, anx with depression, insomnia. \par s/p Robotic RSO, extensive lysis of adhesions, resection of peritoneal nodule\par s/p admission 11/22/21 - 11/23/21 with chest pains. Reports that the pain started in the back. she was seen by cardiology - cxr neg, echo with improved EF, diastolic dysfunction, Stress okay. CBC nl, Cr 0.58, A1c 5.6, lft nl, chol 139, trig 93, HDL 47, LDL 73. no palpitations, dyspnea. with occ dizziness attributed to the diuretic. diuretic changed to HCTZ and potassium added. Reports sx improved, but has returned with occ rt and lt medial chest pains. + some pains under the breasts. No current CP. no cough or wheezing. no fevers. Pain triggered/exacerbated by sitting and thinking about the pain. Better with moving around and doing things. + some fatigue. \par \par Cardiology - Dr Giang - [phone] meds have been changed since the hospital - on losartan/hctz, potassium, lasix stopped, brilinta was stopped. \par completed cardiac rehab.\par Has appt with cardiology tomorrow.\par \par She is an active smoker - was cutting down, trying to quit. Has changed to vaping - plans on quitting in 2/23\par cut down on the drinking - has been better - reports now rare\par Has been walking daily. Has been following diet.\par Taking meds w/o issues. \par No cv sx. \par no GI sx. no pain, nausea, vomiting diarrhea, constipation. \par no noted neuro sx. \par Taking melatonin as needed.\par SAw GI - had cologaurd 8/21\par Reports that she has been having anxiety - Denies depression. Has been improved since she stopped working. did not like the way the ssri made her feel. notr taking. Has been taking the benzo as needed - Has been lasting > 1 mo.  now going through divorce.\par Has been having some pains at the left shoulder. \par \par vaccines - reported as up to date\par for flu vaccine\par \par Mammo - due - to make appt\par gyn- up to date.\par ophtho - due.

## 2022-11-15 NOTE — HEALTH RISK ASSESSMENT
[Current] : Current [Yes] : Yes [Monthly or less (1 pt)] : Monthly or less (1 point) [1 or 2 (0 pts)] : 1 or 2 (0 points) [Never (0 pts)] : Never (0 points) [No] : In the past 12 months have you used drugs other than those required for medical reasons? No [0] : 2) Feeling down, depressed, or hopeless: Not at all (0) [PHQ-2 Negative - No further assessment needed] : PHQ-2 Negative - No further assessment needed [de-identified] : vaping [Audit-CScore] : 1 [[id number]] : 0

## 2022-11-15 NOTE — PHYSICAL EXAM
[No Acute Distress] : no acute distress [Well Nourished] : well nourished [Well Developed] : well developed [Well-Appearing] : well-appearing [Normal Voice/Communication] : normal voice/communication [Normal Sclera/Conjunctiva] : normal sclera/conjunctiva [PERRL] : pupils equal round and reactive to light [EOMI] : extraocular movements intact [Normal Outer Ear/Nose] : the outer ears and nose were normal in appearance [No JVD] : no jugular venous distention [No Lymphadenopathy] : no lymphadenopathy [Supple] : supple [Thyroid Normal, No Nodules] : the thyroid was normal and there were no nodules present [No Respiratory Distress] : no respiratory distress  [No Accessory Muscle Use] : no accessory muscle use [Clear to Auscultation] : lungs were clear to auscultation bilaterally [Normal Rate] : normal rate  [Regular Rhythm] : with a regular rhythm [Normal S1, S2] : normal S1 and S2 [No Murmur] : no murmur heard [No Carotid Bruits] : no carotid bruits [No Abdominal Bruit] : a ~M bruit was not heard ~T in the abdomen [Pedal Pulses Present] : the pedal pulses are present [No Edema] : there was no peripheral edema [No Palpable Aorta] : no palpable aorta [Soft] : abdomen soft [Non Tender] : non-tender [Non-distended] : non-distended [No Masses] : no abdominal mass palpated [No HSM] : no HSM [Normal Bowel Sounds] : normal bowel sounds [Normal Posterior Cervical Nodes] : no posterior cervical lymphadenopathy [Normal Anterior Cervical Nodes] : no anterior cervical lymphadenopathy [No CVA Tenderness] : no CVA  tenderness [No Spinal Tenderness] : no spinal tenderness [No Joint Swelling] : no joint swelling [Grossly Normal Strength/Tone] : grossly normal strength/tone [Coordination Grossly Intact] : coordination grossly intact [No Focal Deficits] : no focal deficits [Normal Gait] : normal gait [Speech Grossly Normal] : speech grossly normal [Memory Grossly Normal] : memory grossly normal [Normal Affect] : the affect was normal [Alert and Oriented x3] : oriented to person, place, and time [Normal Mood] : the mood was normal [Normal Insight/Judgement] : insight and judgment were intact [de-identified] : obese

## 2022-11-16 LAB
ALBUMIN SERPL ELPH-MCNC: 4.3 G/DL
ALP BLD-CCNC: 74 U/L
ALT SERPL-CCNC: 21 U/L
ANION GAP SERPL CALC-SCNC: 11 MMOL/L
AST SERPL-CCNC: 14 U/L
BASOPHILS # BLD AUTO: 0.08 K/UL
BASOPHILS NFR BLD AUTO: 1.1 %
BILIRUB SERPL-MCNC: 0.5 MG/DL
BUN SERPL-MCNC: 12 MG/DL
CALCIUM SERPL-MCNC: 9.5 MG/DL
CHLORIDE SERPL-SCNC: 100 MMOL/L
CHOLEST SERPL-MCNC: 150 MG/DL
CO2 SERPL-SCNC: 30 MMOL/L
CREAT SERPL-MCNC: 0.47 MG/DL
EGFR: 117 ML/MIN/1.73M2
EOSINOPHIL # BLD AUTO: 0.26 K/UL
EOSINOPHIL NFR BLD AUTO: 3.4 %
ESTIMATED AVERAGE GLUCOSE: 120 MG/DL
GLUCOSE SERPL-MCNC: 105 MG/DL
HBA1C MFR BLD HPLC: 5.8 %
HCT VFR BLD CALC: 41.6 %
HDLC SERPL-MCNC: 45 MG/DL
HGB BLD-MCNC: 12.8 G/DL
IMM GRANULOCYTES NFR BLD AUTO: 0.3 %
LDLC SERPL CALC-MCNC: 81 MG/DL
LYMPHOCYTES # BLD AUTO: 2.12 K/UL
LYMPHOCYTES NFR BLD AUTO: 28 %
MAGNESIUM SERPL-MCNC: 1.9 MG/DL
MAN DIFF?: NORMAL
MCHC RBC-ENTMCNC: 26.9 PG
MCHC RBC-ENTMCNC: 30.8 GM/DL
MCV RBC AUTO: 87.4 FL
MONOCYTES # BLD AUTO: 0.35 K/UL
MONOCYTES NFR BLD AUTO: 4.6 %
NEUTROPHILS # BLD AUTO: 4.73 K/UL
NEUTROPHILS NFR BLD AUTO: 62.6 %
NONHDLC SERPL-MCNC: 105 MG/DL
PLATELET # BLD AUTO: 274 K/UL
POTASSIUM SERPL-SCNC: 3.2 MMOL/L
PROT SERPL-MCNC: 6.8 G/DL
RBC # BLD: 4.76 M/UL
RBC # FLD: 14.7 %
SODIUM SERPL-SCNC: 141 MMOL/L
TRIGL SERPL-MCNC: 122 MG/DL
TSH SERPL-ACNC: 1.55 UIU/ML
WBC # FLD AUTO: 7.56 K/UL

## 2023-01-13 NOTE — PATIENT PROFILE ADULT - LEGAL HELP
Patient presented today for catheter removal following cystolitholapaxy.  No new questions or concerns at this time.  Patient will follow-up as scheduled.  
no

## 2023-01-19 DIAGNOSIS — M79.676 PAIN IN UNSPECIFIED TOE(S): ICD-10-CM

## 2023-03-10 ENCOUNTER — INPATIENT (INPATIENT)
Facility: HOSPITAL | Age: 48
LOS: 2 days | Discharge: HOME | DRG: 247 | End: 2023-03-13
Attending: INTERNAL MEDICINE | Admitting: INTERNAL MEDICINE
Payer: COMMERCIAL

## 2023-03-10 VITALS
HEIGHT: 68 IN | OXYGEN SATURATION: 98 % | HEART RATE: 83 BPM | DIASTOLIC BLOOD PRESSURE: 96 MMHG | TEMPERATURE: 98 F | WEIGHT: 212.97 LBS | SYSTOLIC BLOOD PRESSURE: 141 MMHG | RESPIRATION RATE: 18 BRPM

## 2023-03-10 DIAGNOSIS — Z98.890 OTHER SPECIFIED POSTPROCEDURAL STATES: Chronic | ICD-10-CM

## 2023-03-10 DIAGNOSIS — D27.1 BENIGN NEOPLASM OF LEFT OVARY: Chronic | ICD-10-CM

## 2023-03-10 LAB
ALBUMIN SERPL ELPH-MCNC: 4.3 G/DL — SIGNIFICANT CHANGE UP (ref 3.3–5)
ALP SERPL-CCNC: 83 U/L — SIGNIFICANT CHANGE UP (ref 40–120)
ALT FLD-CCNC: 22 U/L — SIGNIFICANT CHANGE UP (ref 10–45)
ANION GAP SERPL CALC-SCNC: 13 MMOL/L — SIGNIFICANT CHANGE UP (ref 5–17)
APTT BLD: 31.1 SEC — SIGNIFICANT CHANGE UP (ref 27.5–35.5)
AST SERPL-CCNC: 15 U/L — SIGNIFICANT CHANGE UP (ref 10–40)
BASOPHILS # BLD AUTO: 0.06 K/UL — SIGNIFICANT CHANGE UP (ref 0–0.2)
BASOPHILS NFR BLD AUTO: 0.5 % — SIGNIFICANT CHANGE UP (ref 0–2)
BILIRUB SERPL-MCNC: 0.2 MG/DL — SIGNIFICANT CHANGE UP (ref 0.2–1.2)
BUN SERPL-MCNC: 21 MG/DL — SIGNIFICANT CHANGE UP (ref 7–23)
CALCIUM SERPL-MCNC: 9.7 MG/DL — SIGNIFICANT CHANGE UP (ref 8.4–10.5)
CHLORIDE SERPL-SCNC: 99 MMOL/L — SIGNIFICANT CHANGE UP (ref 96–108)
CK MB CFR SERPL CALC: 1 NG/ML — SIGNIFICANT CHANGE UP (ref 0–3.8)
CO2 SERPL-SCNC: 29 MMOL/L — SIGNIFICANT CHANGE UP (ref 22–31)
CREAT SERPL-MCNC: 0.69 MG/DL — SIGNIFICANT CHANGE UP (ref 0.5–1.3)
EGFR: 106 ML/MIN/1.73M2 — SIGNIFICANT CHANGE UP
EOSINOPHIL # BLD AUTO: 0.35 K/UL — SIGNIFICANT CHANGE UP (ref 0–0.5)
EOSINOPHIL NFR BLD AUTO: 3 % — SIGNIFICANT CHANGE UP (ref 0–6)
FLUAV AG NPH QL: SIGNIFICANT CHANGE UP
FLUBV AG NPH QL: SIGNIFICANT CHANGE UP
GLUCOSE SERPL-MCNC: 101 MG/DL — HIGH (ref 70–99)
HCG SERPL-ACNC: <2 MIU/ML — SIGNIFICANT CHANGE UP
HCT VFR BLD CALC: 41.8 % — SIGNIFICANT CHANGE UP (ref 34.5–45)
HGB BLD-MCNC: 13.3 G/DL — SIGNIFICANT CHANGE UP (ref 11.5–15.5)
IMM GRANULOCYTES NFR BLD AUTO: 0.4 % — SIGNIFICANT CHANGE UP (ref 0–0.9)
INR BLD: 1.13 RATIO — SIGNIFICANT CHANGE UP (ref 0.88–1.16)
LYMPHOCYTES # BLD AUTO: 2.95 K/UL — SIGNIFICANT CHANGE UP (ref 1–3.3)
LYMPHOCYTES # BLD AUTO: 24.9 % — SIGNIFICANT CHANGE UP (ref 13–44)
MCHC RBC-ENTMCNC: 27.3 PG — SIGNIFICANT CHANGE UP (ref 27–34)
MCHC RBC-ENTMCNC: 31.8 GM/DL — LOW (ref 32–36)
MCV RBC AUTO: 85.8 FL — SIGNIFICANT CHANGE UP (ref 80–100)
MONOCYTES # BLD AUTO: 0.53 K/UL — SIGNIFICANT CHANGE UP (ref 0–0.9)
MONOCYTES NFR BLD AUTO: 4.5 % — SIGNIFICANT CHANGE UP (ref 2–14)
NEUTROPHILS # BLD AUTO: 7.9 K/UL — HIGH (ref 1.8–7.4)
NEUTROPHILS NFR BLD AUTO: 66.7 % — SIGNIFICANT CHANGE UP (ref 43–77)
NRBC # BLD: 0 /100 WBCS — SIGNIFICANT CHANGE UP (ref 0–0)
PLATELET # BLD AUTO: 267 K/UL — SIGNIFICANT CHANGE UP (ref 150–400)
POTASSIUM SERPL-MCNC: 3.5 MMOL/L — SIGNIFICANT CHANGE UP (ref 3.5–5.3)
POTASSIUM SERPL-SCNC: 3.5 MMOL/L — SIGNIFICANT CHANGE UP (ref 3.5–5.3)
PROT SERPL-MCNC: 7 G/DL — SIGNIFICANT CHANGE UP (ref 6–8.3)
PROTHROM AB SERPL-ACNC: 13 SEC — SIGNIFICANT CHANGE UP (ref 10.5–13.4)
RBC # BLD: 4.87 M/UL — SIGNIFICANT CHANGE UP (ref 3.8–5.2)
RBC # FLD: 13.8 % — SIGNIFICANT CHANGE UP (ref 10.3–14.5)
RSV RNA NPH QL NAA+NON-PROBE: SIGNIFICANT CHANGE UP
SARS-COV-2 RNA SPEC QL NAA+PROBE: SIGNIFICANT CHANGE UP
SODIUM SERPL-SCNC: 141 MMOL/L — SIGNIFICANT CHANGE UP (ref 135–145)
TROPONIN T, HIGH SENSITIVITY RESULT: <6 NG/L — SIGNIFICANT CHANGE UP (ref 0–51)
WBC # BLD: 11.84 K/UL — HIGH (ref 3.8–10.5)
WBC # FLD AUTO: 11.84 K/UL — HIGH (ref 3.8–10.5)

## 2023-03-10 PROCEDURE — 99223 1ST HOSP IP/OBS HIGH 75: CPT

## 2023-03-10 PROCEDURE — 71045 X-RAY EXAM CHEST 1 VIEW: CPT | Mod: 26

## 2023-03-10 RX ORDER — ASPIRIN/CALCIUM CARB/MAGNESIUM 324 MG
81 TABLET ORAL DAILY
Refills: 0 | Status: DISCONTINUED | OUTPATIENT
Start: 2023-03-11 | End: 2023-03-12

## 2023-03-10 RX ORDER — POTASSIUM CHLORIDE 20 MEQ
40 PACKET (EA) ORAL DAILY
Refills: 0 | Status: DISCONTINUED | OUTPATIENT
Start: 2023-03-11 | End: 2023-03-13

## 2023-03-10 RX ORDER — LOSARTAN POTASSIUM 100 MG/1
100 TABLET, FILM COATED ORAL AT BEDTIME
Refills: 0 | Status: DISCONTINUED | OUTPATIENT
Start: 2023-03-11 | End: 2023-03-13

## 2023-03-10 RX ORDER — LANOLIN ALCOHOL/MO/W.PET/CERES
5 CREAM (GRAM) TOPICAL ONCE
Refills: 0 | Status: COMPLETED | OUTPATIENT
Start: 2023-03-10 | End: 2023-03-10

## 2023-03-10 RX ORDER — ATORVASTATIN CALCIUM 80 MG/1
40 TABLET, FILM COATED ORAL AT BEDTIME
Refills: 0 | Status: DISCONTINUED | OUTPATIENT
Start: 2023-03-11 | End: 2023-03-13

## 2023-03-10 RX ORDER — CHLORTHALIDONE 50 MG
25 TABLET ORAL DAILY
Refills: 0 | Status: DISCONTINUED | OUTPATIENT
Start: 2023-03-11 | End: 2023-03-12

## 2023-03-10 RX ORDER — ASPIRIN/CALCIUM CARB/MAGNESIUM 324 MG
243 TABLET ORAL ONCE
Refills: 0 | Status: COMPLETED | OUTPATIENT
Start: 2023-03-10 | End: 2023-03-10

## 2023-03-10 RX ADMIN — Medication 243 MILLIGRAM(S): at 21:12

## 2023-03-10 RX ADMIN — Medication 0.5 MILLIGRAM(S): at 23:11

## 2023-03-10 NOTE — ED CDU PROVIDER INITIAL DAY NOTE - ATTENDING APP SHARED VISIT CONTRIBUTION OF CARE
Attending (Kobe Treadwell M.D.):  I have personally seen and examined this patient. I have performed a substantive portion of the visit including all aspects of the medical decision making. Resident and/or ACP note reviewed. I agree on the plan of care except where noted.    See MDM

## 2023-03-10 NOTE — ED CDU PROVIDER INITIAL DAY NOTE - NSTIMEPROVIDERCAREINITIATE_GEN_ER
Physical Therapy Daily Treatment     Visit Count: 12  Plan of Care Dates: Initial: 3/27/2018 Through: 6/29/2018  Insurance Information: VISIT LIMIT: based on Medical necessity   PER YEAR  AUTHORIZATION NEEDED: no  Next Referring Provider Visit: PRN    Referred by: Nicola Galaviz MD  Medical Diagnosis (from order):  Acute low back pain with sciatica, sciatica laterality unspecified, unspecified back pain laterality [M54.40]  Insurance: 1. [employer]  2. N/A    Date of onset/injury: 2/21/18  Diagnosis Precautions: none  Chart reviewed: Relevant co-morbidities, allergies, tests and medications:   antiinflammatory for 7 days, tramadol, muscle relaxer,     SUBJECTIVE   Periodic jabs of pain on the right side of the back - unsure of cause.  Minimal pain on the left side and denies radicular symptoms     Current Pain: 0/10.    Functional Change: has been increasing work outs in the gym and doing more activities around the house     OBJECTIVE   Gait Analysis:  Normal walking in the clinic      Range of Motion (%)    Initial Current    Lumbar Flexion 40* 75 pulling    Lumbar Extension 50 80   Lumbar Lateral Flexion Left 30* 60   Lumbar Lateral Flexion Right  50 75   Lumbar Rotation Left  nt 75   Lumbar Rotation Right  nt 75   standard testing positions unless otherwise noted; Key: ranges are reported in active range of motion unless noted as AA=active assistive or P=passive range of motion, * denotes pain   Comments:     Strength (out of 5)    Level        Date   Initial Initial Current    Abdominals   nt nt    Trunk Extensors              Left Right Left    Hip Flexion L2-3 4 5 5   Hip Extension L4-5 4- 4 5 bilateral    Hip Abduction L4-5 nt nt    Hip Adduction L2-3 nt nt    Hip External Rotation L4-5 nt nt    Hip Internal Rotation L2-3 nt nt    Knee Extension L3-4 4+ 5 5   Knee Flexion L5-S1 4 5 5   Ankle Plantar Flexion  S1-2 5 5    Ankle Dorsiflexion  L4-5 4 5 5   Ankle Inversion L4 nt nt    Ankle  Eversion L5-S1 nt nt    Extensor Hallucis Longus L5 4 5    standard testing positions unless otherwise noted,* denotes pain  Comments:          Treatment   Therapeutic Exercise:   Patient was instructed in relevant anatomy, condition , self care, activity modification and tissue healing.    Neuromuscular Re-Education:  Advancement of stabilization exercises - progress to exercise ball for stability   Seated leg raises  Prone lying alternate leg raises   Bridging     Current Home Program (not performed this date except as noted above):   Start fitness walking as tolerated  Prone press up - progression from prone on elbows   Standing trunk extension - modification of hand placement   Piriformis stretch supine and seated   positional distraction   Sitting posture   Modalities   Prone lying alternating arm and leg raises   Standing 3 way hip strengthening with cable column - given green and blue resistance band for home  Body weight squats  Body weight squats with swiss ball at the gym     ASSESSMENT   Improved tolerance to gym work and core strengthening with localization of radicular symptoms.   Pain after treatment: 0/10  Result of above outlined education: Verbalizes understanding and Demonstrates understanding     Goals:  To be obtained by end of this plan of care:  1. Patient independent with modified and progressed home exercise program.  2. Patient will report decreased lumbar pain/symptoms to 0/10 to aid in normalization of gait for independent living.   3. Patient will increase lumbar active range of motion to within normal limits to aid in completing household tasks.  4. Patient will increase involved strength to 5/5 to aid in age appropriate activities and completing household tasks.  5. Patient will demonstrate proper body mechanics for sitting and standing.  6. Patient will decrease radicular symptoms by 80 % to aid in sleeping undisturbed through a night.    PLAN   Modalities as needed, manual therapy,  stabilization exercises    frequency 1 time a week     THERAPY DAILY BILLING   Primary Insurance:  [employer]  Secondary Insurance: N/A    Evaluation Procedures:  No evaluation codes were used on this date of service    Timed Procedures:  Neuromuscular Re-Education, 25 minutes  Therapeutic Exercise, 15 minutes    Untimed Procedures:  No untimed codes were used on this date of service    Total Treatment Time: 40 minutes        10-Mar-2023 21:01

## 2023-03-10 NOTE — ED CDU PROVIDER INITIAL DAY NOTE - DETAILS
tele, echo, pain control as needed, cardiology following, [facility] ED team, vitals every 4 hours, frequent reevaluations

## 2023-03-10 NOTE — ED PROVIDER NOTE - RAPID ASSESSMENT
47F w/ PMHx of heart attack 12/03 presents to ED c/o mid back pain (variations of squeezing and sharp sensations) onset 2-3 weeks ago. Pt was sent by cardiologist s/p visit today. Pt states that symptoms are similar to when she had the heart attack. Pt was experiencing nausea a couple of days ago w/ diaphoresis. Pt states Sx worsens when staying still. Pt takes Asprin.    Patient was rapidly assessed via a rapid medical evaluation and/or role of Quick Triage Doctor; a limited history, physical exam and assessment was performed. The patient will be seen and further evaluated in the main emergency department. The remainder of care and evaluation will be conducted by the primary emergency medicine team. Receiving team will follow up on labs, imaging and serially reassess patient as indicated. All further decisions regarding patient care, evaluation and disposition are at the discretion of the receiving primary emergency department team. Seen by Femi Kay (MD) and Ann Gutierrez). 47F w/ PMHx of heart attack 12/03 presents to ED c/o mid back pain (variations of squeezing and sharp sensations) onset 2-3 weeks ago. Pt was sent by cardiologist s/p visit today. Pt states that symptoms are similar to when she had the heart attack. Pt was experiencing nausea a couple of days ago w/ diaphoresis. Pt states Sx worsens when staying still. Pt takes Asprin.    ECG at 2018 independently interpreted by me and shows normal sinus rhythm, normal axis, no diagnostic acute ischemic changes    I, Dr. Kay, personally performed the service described in the documentation recorded by the scribe in my presence, and it accurately and completely records my words and actions.     Patient was rapidly assessed via a rapid medical evaluation and/or role of Quick Triage Doctor; a limited history, physical exam and assessment was performed. The patient will be seen and further evaluated in the main emergency department. The remainder of care and evaluation will be conducted by the primary emergency medicine team. Receiving team will follow up on labs, imaging and serially reassess patient as indicated. All further decisions regarding patient care, evaluation and disposition are at the discretion of the receiving primary emergency department team. Seen by Femi Kay (MD) and Ann Gutierrez ([place]).

## 2023-03-10 NOTE — ED CDU PROVIDER INITIAL DAY NOTE - NS ED ATTENDING STATEMENT MOD
This was a shared visit with the HANNA. I reviewed and verified the documentation and independently performed the documented:

## 2023-03-10 NOTE — ED PROVIDER NOTE - OBJECTIVE STATEMENT
46yo F w/ hx htn, smoker, AWSTEMI in 12/2020 s/p PCI to LAD (prox and mid) presenting with back pain. Patient has had back pain between her shoulder blades intermittently started 2 weeks ago, feels like when she had MI in 2020.  Had brief episode of midsternal chest pressure on Wednesday. 46yo F w/ hx htn, smoker, AWSTEMI in 12/2020 s/p PCI to LAD (prox and mid) presenting with back pain. Patient has had back pain between her shoulder blades intermittently started 2 weeks ago, feels like when she had MI in 2020.  Had brief episode of midsternal chest pressure on Wednesday. No current cp. Chest pain and back pain are not exertional.  Back pain worse with sitting still.  No fever, shortness of breath, abdominal pain, nausea, vomiting.  Does not remember when her last stress or echo were, thinks they were recent.

## 2023-03-10 NOTE — ED PROVIDER NOTE - ATTENDING CONTRIBUTION TO CARE
Attending (Kobe Treadwell M.D.):  I have personally seen and examined this patient. I have performed a substantive portion of the visit including all aspects of the medical decision making. Resident and/or ACP note reviewed. I agree on the plan of care except where noted.

## 2023-03-10 NOTE — ED PROVIDER NOTE - CLINICAL SUMMARY MEDICAL DECISION MAKING FREE TEXT BOX
46yo F w/ hx htn, smoker, AWSTEMI in 12/2020 s/p PCI to LAD (prox and mid) presenting with back pain. Patient has had back pain between her shoulder blades intermittently started 2 weeks ago, feels like when she had MI in 2020.  Had brief episode of midsternal chest pressure on Wednesday. No current cp. Chest pain and back pain are not exertional.  Back pain worse with sitting still.  No fever, shortness of breath, abdominal pain, nausea, vomiting.  Does not remember when her last stress or echo were, thinks they were recent. Exam nonfocal. Given hx ACS, c/f acs. Will give asa, get labs, trop, reach out to Dr. Giang.

## 2023-03-10 NOTE — ED CDU PROVIDER DISPOSITION NOTE - CLINICAL COURSE
48yo F w/ hx HTN, smoker, STEMI in 12/2020 s/p PCI to LAD (prox and mid) presenting with back pain. Patient has had back pain between her shoulder blades intermittently started 2 weeks ago, feels like when she had MI in 2020.  Had brief episode of midsternal chest pressure on Wednesday. No current cp. Chest pain and back pain are not exertional. No fever, shortness of breath, abdominal pain, nausea, vomiting.  ED course: EKG NSR. Troponin <6. Discussed case with patient's cardiologist Dr. Giang, recommending echocardiogram. Plan for tele monitoring and echo in CDU. 48yo F w/ hx HTN, smoker, STEMI in 12/2020 s/p PCI to LAD (prox and mid) presenting with back pain. Patient has had back pain between her shoulder blades intermittently started 2 weeks ago, feels like when she had MI in 2020.  Had brief episode of midsternal chest pressure on Wednesday. No current cp. Chest pain and back pain are not exertional. No fever, shortness of breath, abdominal pain, nausea, vomiting.  ED course: EKG NSR. Troponin <6. Discussed case with patient's cardiologist Dr. Giang, recommending echocardiogram. Plan for tele monitoring and echo in CDU.  Patient remains HD stable, echo completed results pending. Patients cardiology team recommending admission for repeat cath. will discuss with Dr. Aramis Rodriguez PA-C

## 2023-03-10 NOTE — ED CDU PROVIDER DISPOSITION NOTE - NSFOLLOWUPINSTRUCTIONS_ED_ALL_ED_FT
Hydrate.      We recommend you follow up with your primary care provider within the next 2-3 days, please bring all of your results with you. You can also follow up with your cardiologist within the next week.     Please return to the Emergency Department with new, worsening, or concerning symptoms, such as:  -Shortness of breath or trouble breathing  -Pressure, pain, tightness in chest  -Facial drooping, arm weakness, or speech difficulty   -Head injury or loss of consciousness   -Nonstop bleeding or an open wound     *More detailed information regarding your visit and discharge can be found by reviewing this packet Hydrate.      We recommend you follow up with your primary care provider within the next 2-3 days, please bring all of your results with you. You can also follow up with your cardiologist within the next week.     Your labs indicate PREDIABETES- you should make dietary and exercise changes to prevent progression to diabetes.     Please return to the Emergency Department with new, worsening, or concerning symptoms, such as:  -Shortness of breath or trouble breathing  -Pressure, pain, tightness in chest  -Facial drooping, arm weakness, or speech difficulty   -Head injury or loss of consciousness   -Nonstop bleeding or an open wound     *More detailed information regarding your visit and discharge can be found by reviewing this packet

## 2023-03-10 NOTE — ED ADULT NURSE NOTE - OBJECTIVE STATEMENT
47yF PMHX of HA x2020 (2 stents) on ASA presents to the ED c/o intermittent sharp upper back pain for 2 weeks and chest pressure/nausea/diaphoresis a couple of days ago (resolved now). pt f/u with cardiologist and was sent here for further evaluation. pt denies f/c/n/v/d, trauma, CP, SOB, ha, blurry vision, radiating pain to shoulder. 47yF A&Ox4 Ambulatory PMHX of MI x2020 (2 stents) on ASA presents to the ED c/o intermittent sharp upper back pain for 2 weeks and chest pressure/nausea/diaphoresis a couple of days ago (resolved now). pt f/u with cardiologist and was sent here for further evaluation. pt denies f/c/n/v/d, trauma, CP, SOB, ha, blurry vision, radiating pain to shoulder.

## 2023-03-10 NOTE — ED CDU PROVIDER INITIAL DAY NOTE - OBJECTIVE STATEMENT
48yo F w/ hx HTN, smoker, STEMI in 12/2020 s/p PCI to LAD (prox and mid) presenting with back pain. Patient has had back pain between her shoulder blades intermittently started 2 weeks ago, feels like when she had MI in 2020.  Had brief episode of midsternal chest pressure on Wednesday. No current cp. Chest pain and back pain are not exertional. No fever, shortness of breath, abdominal pain, nausea, vomiting.

## 2023-03-10 NOTE — ED CDU PROVIDER INITIAL DAY NOTE - PHYSICAL EXAMINATION
General appearance: NAD, conversant, afebrile   	 Eyes: anicteric sclerae  	 HENT: Atraumatic  	 Pulm: CTA bl, normal respiratory effort and no intercostal retractions, normal work of breathing  	 CV: RRR, No murmurs, rubs, or gallops  	 Abdomen: Soft, non-tender  	 Extremities: No peripheral edema, no calf tenderness   Neuro: Clear speech, moving all extremities without difficulty, steady gait    Psych: Appropriate affect, cooperative; alert and oriented to person, place and time  No midline C/T/L spine tenderness

## 2023-03-10 NOTE — ED CDU PROVIDER INITIAL DAY NOTE - NS ED ROS FT
Constitutional: No fever or chills  Eyes: No visual changes, no eye pain  CV: + chest pain No lower extremity edema  Resp: No SOB no cough  GI: No abd pain. No nausea or vomiting. No diarrhea.   : No dysuria, hematuria.   MSK: +back pain  Skin: No rash  Psych: No complaints   Neuro: No headache. No numbness or tingling. No weakness.  Endo: No known diabetes

## 2023-03-10 NOTE — ED ADULT NURSE NOTE - NS ED NURSE RECORD ANOTHER VITAL SIGN
Ms. Mcnair presents after having a panniculectomy.  This is her final   postoperative visit.  She has done great.  She is very happy.  Everything has   healed nicely.  She is discharged.      AMAURI/PAULINE  dd: 12/22/2017 12:41:07 (CST)  td: 12/23/2017 05:03:48 (CST)  Doc ID   #[id number]  Job ID #[id number]    CC:       
Yes

## 2023-03-10 NOTE — ED PROVIDER NOTE - PHYSICAL EXAMINATION
General appearance: NAD, conversant, afebrile    Eyes: anicteric sclerae, NERISSA, EOMI   HENT: Atraumatic; oropharynx clear, MMM and no ulcerations, no pharyngeal erythema or exudate   Neck: Trachea midline; Full range of motion, supple   Pulm: CTA bl, normal respiratory effort and no intercostal retractions, normal work of breathing   CV: RRR, No murmurs, rubs, or gallops. 2+ peripheral pulses.   Abdomen: Soft, non-tender, non-distended; no guarding or rebound   Extremities: No peripheral edema or extremity lymphadenopathy. 5/5 strength in all four extremities.   Skin: Dry, normal temperature, turgor and texture; no rash, ulcers or subcutaneous nodules   Psych: Appropriate affect, cooperative; alert and oriented to person, place and time

## 2023-03-10 NOTE — ED CDU PROVIDER INITIAL DAY NOTE - CLINICAL SUMMARY MEDICAL DECISION MAKING FREE TEXT BOX
Mile (MD) - 46 yo F hx of HTN, CAD s/p stents presenting with chest and back pain x 2 weeks, intermittent in nature. Feels similar to when she had MI in 2020 and needed to get stents. Denies SOB, HA, f/c, n/v. Sent by cardiologist for evaluation. Trop negative in ED. EKG unchanged. Placed in CDU for echo per cardiologist Dr. Giang.

## 2023-03-10 NOTE — ED ADULT NURSE NOTE - PAIN: BODY LOCATION
Pt insisted on leaving AMA and admitting MD was  paged to notify. Admitting MD did talk to the pt via phone but pt continued to insist on leaving AMA. He called his wife to pick him up and wife was waiting outside the ER upset. Pt states that he is upset that he was still in the ED and not upstairs and believes that contradicted what the admitting team had told him. Port was de accessed prior to pt leaving. Writer walked pt to the car and in the mean time, spouse was upset about the whole situation. Spouse did not want the pt to leave AMA since that affected the insurance. So pt and spouse discussed the matter and decided to come back to the ER and not leave AMA. Pt was provided a hospital bed in the ED. Admitting team was notified of the above situation.    upper back

## 2023-03-11 ENCOUNTER — NON-APPOINTMENT (OUTPATIENT)
Age: 48
End: 2023-03-11

## 2023-03-11 DIAGNOSIS — I25.10 ATHEROSCLEROTIC HEART DISEASE OF NATIVE CORONARY ARTERY WITHOUT ANGINA PECTORIS: ICD-10-CM

## 2023-03-11 DIAGNOSIS — M54.9 DORSALGIA, UNSPECIFIED: ICD-10-CM

## 2023-03-11 DIAGNOSIS — I10 ESSENTIAL (PRIMARY) HYPERTENSION: ICD-10-CM

## 2023-03-11 DIAGNOSIS — E78.5 HYPERLIPIDEMIA, UNSPECIFIED: ICD-10-CM

## 2023-03-11 LAB
A1C WITH ESTIMATED AVERAGE GLUCOSE RESULT: 5.8 % — HIGH (ref 4–5.6)
CHOLEST SERPL-MCNC: 111 MG/DL — SIGNIFICANT CHANGE UP
ESTIMATED AVERAGE GLUCOSE: 120 MG/DL — HIGH (ref 68–114)
HDLC SERPL-MCNC: 41 MG/DL — LOW
LIPID PNL WITH DIRECT LDL SERPL: 60 MG/DL — SIGNIFICANT CHANGE UP
NON HDL CHOLESTEROL: 70 MG/DL — SIGNIFICANT CHANGE UP
TRIGL SERPL-MCNC: 48 MG/DL — SIGNIFICANT CHANGE UP

## 2023-03-11 PROCEDURE — 99222 1ST HOSP IP/OBS MODERATE 55: CPT

## 2023-03-11 PROCEDURE — 99233 SBSQ HOSP IP/OBS HIGH 50: CPT

## 2023-03-11 RX ORDER — LANOLIN ALCOHOL/MO/W.PET/CERES
5 CREAM (GRAM) TOPICAL AT BEDTIME
Refills: 0 | Status: DISCONTINUED | OUTPATIENT
Start: 2023-03-12 | End: 2023-03-13

## 2023-03-11 RX ORDER — SEMAGLUTIDE 0.68 MG/ML
0 INJECTION, SOLUTION SUBCUTANEOUS
Qty: 0 | Refills: 0 | DISCHARGE

## 2023-03-11 RX ORDER — LANOLIN ALCOHOL/MO/W.PET/CERES
5 CREAM (GRAM) TOPICAL ONCE
Refills: 0 | Status: COMPLETED | OUTPATIENT
Start: 2023-03-11 | End: 2023-03-11

## 2023-03-11 RX ADMIN — Medication 40 MILLIEQUIVALENT(S): at 09:28

## 2023-03-11 RX ADMIN — Medication 0.5 MILLIGRAM(S): at 20:21

## 2023-03-11 RX ADMIN — ATORVASTATIN CALCIUM 40 MILLIGRAM(S): 80 TABLET, FILM COATED ORAL at 21:12

## 2023-03-11 RX ADMIN — LOSARTAN POTASSIUM 100 MILLIGRAM(S): 100 TABLET, FILM COATED ORAL at 21:12

## 2023-03-11 RX ADMIN — Medication 0.5 MILLIGRAM(S): at 11:54

## 2023-03-11 RX ADMIN — Medication 25 MILLIGRAM(S): at 11:51

## 2023-03-11 RX ADMIN — Medication 5 MILLIGRAM(S): at 21:12

## 2023-03-11 RX ADMIN — Medication 81 MILLIGRAM(S): at 09:30

## 2023-03-11 NOTE — CHART NOTE - NSCHARTNOTEFT_GEN_A_CORE
EVENT: ask to see pt for request of her home medication lorazepam  Chart and meds reviewed.  Pt acknowledged taking lorazepam at home for anxiety and reports feelings of anxiety with onset "2 hrs ago"; Denies CP/palpitation/SOB/HA/dizziness/abd pain/n/v/d/f/c; also requesting sleep aid and reports she takes at home "melatonin 10mg" but is "ok with 5mg" since "it's been a while" she took it at home.     HPI: 47 F PMHx of CAD s/p PCI of LAD in 2020, HTN, HLD, anxiety, GERD who presents with back pain for a few days. Patient states she had similar complains in 2020 when she has an MI. She denies any nausea and diaphoresis. States she had Chest pressor/ pain ~4 days ago at work. States she was walking when she experienced pain, unclear who long the pain lasted, relieved with rest. She has been compliant with all her meds. Currently she denies CP, leg swelling, SOB, abd pain, n/v, and dizziness   Patient was seen by cardiology in ED plan for [facility] tomorrow  (11 Mar 2023 15:58)      MEDICATIONS  (STANDING):  aspirin enteric coated 81 milliGRAM(s) Oral daily  atorvastatin 40 milliGRAM(s) Oral at bedtime  chlorthalidone 25 milliGRAM(s) Oral daily  losartan 100 milliGRAM(s) Oral at bedtime  potassium chloride    Tablet ER 40 milliEquivalent(s) Oral daily    MEDICATIONS  (PRN):      VITALS:  Vital Signs Last 24 Hrs  T(C): 36.5 (11 Mar 2023 18:08), Max: 36.6 (10 Mar 2023 21:19)  T(F): 97.7 (11 Mar 2023 18:08), Max: 97.9 (10 Mar 2023 23:07)  HR: 75 (11 Mar 2023 18:08) (69 - 83)  BP: 125/86 (11 Mar 2023 18:08) (104/72 - 141/96)  BP(mean): --  RR: 18 (11 Mar 2023 18:08) (18 - 20)  SpO2: 97% (11 Mar 2023 18:08) (90% - 99%)    Parameters below as of 11 Mar 2023 18:08  Patient On (Oxygen Delivery Method): room air      Physical exam  GENERAL: resting in bed, mild facial flushing but NAD  Psych: angry tone, otherwise wnl   LABS:                       13.3   11.84 )-----------( 267      ( 10 Mar 2023 21:01 )             41.8     03-10    141  |  99  |  21  ----------------------------<  101<H>  3.5   |  29  |  0.69    Ca    9.7      10 Mar 2023 21:02    TPro  7.0  /  Alb  4.3  /  TBili  0.2  /  DBili  x   /  AST  15  /  ALT  22  /  AlkPhos  83  03-10    CARDIAC MARKERS ( 10 Mar 2023 21:02 )  x     / x     / x     / x     / 1.0 ng/mL      LIVER FUNCTIONS - ( 10 Mar 2023 21:02 )  Alb: 4.3 g/dL / Pro: 7.0 g/dL / ALK PHOS: 83 U/L / ALT: 22 U/L / AST: 15 U/L / GGT: x           PT/INR - ( 10 Mar 2023 21:02 )   PT: 13.0 sec;   INR: 1.13 ratio         PTT - ( 10 Mar 2023 21:02 )  PTT:31.1 sec      A/P  Chronic anxiety  Insomnia  - will dose with home dose lorazepam 0.5 mg  - melatonin 5mg HS PRN

## 2023-03-11 NOTE — H&P ADULT - HISTORY OF PRESENT ILLNESS
47 F PMHx of CAD s/p PCI of LAD in 2020, HTN, HLD, anxiety, GERD who presents with back pain for a few days. Patient states she had similar complains in 2020 when she has an MI. She denies any nausea and diaphoresis. States she had Chest pressor/ pain ~4 days ago at work. States she was walking when she experienced pain, unclear who long the pain lasted, relieved with rest. She has been compliant with all her meds. Currently she denies CP, leg swelling, SOB, abd pain, n/v, and dizziness     Patient was seen by cardiology in ED plan for [facility] tomorrow

## 2023-03-11 NOTE — ED CDU PROVIDER SUBSEQUENT DAY NOTE - PROGRESS NOTE DETAILS
pt asymptomatic.  no cardiac arrhythmia.  awaiting ECHO and cardiology consultation. spoke with patients cardiology team, they would like to admit for plan for cath. d/w Dr. ava Rodriguez PA-C

## 2023-03-11 NOTE — H&P ADULT - PROBLEM SELECTOR PLAN 1
Currently asymptomatic   - Trop neg,   - EKG: NSR HR 78  - ECHO w/o wall motion abnormalities, EF 73%  - c/w ASA 81 mg   - Cardiology consult appreciated, plan for [facility] tomorrow

## 2023-03-11 NOTE — ED CDU PROVIDER SUBSEQUENT DAY NOTE - CLINICAL SUMMARY MEDICAL DECISION MAKING FREE TEXT BOX
chest/back pain similar to STEMI in 2020 - cardiac monitor, ECHO, Cardiology consultation, reassess.

## 2023-03-11 NOTE — CONSULT NOTE ADULT - SUBJECTIVE AND OBJECTIVE BOX
DATE OF SERVICE: 03-11-23 @ 12:04    CHIEF COMPLAINT:Patient is a 47y old  Female who presents with a chief complaint of     HISTORY OF PRESENT ILLNESS: 47F w/ PMHx of CAD s/p PCI of LAD in 2020, HTN, HLD, anxiety, GERD presents to ED c/o mid back pain (variations of squeezing and sharp sensations) onset 2-3 weeks ago. Pt states that symptoms are similar to when she had the heart attack. Pt was experiencing nausea a couple of days ago w/ diaphoresis. Pt states Sx worsens when staying still.       PAST MEDICAL & SURGICAL HISTORY:  Hypertension      CAD (coronary artery disease)      Hyperlipidemia      Anxiety and depression      GERD (gastroesophageal reflux disease)      COVID-19      Benign ovarian tumor, left  and fibroid - partial hysterectomy      S/P cardiac cath  2 stents 12/2020      H/O meniscectomy of right knee              MEDICATIONS:  aspirin enteric coated 81 milliGRAM(s) Oral daily  chlorthalidone 25 milliGRAM(s) Oral daily  losartan 100 milliGRAM(s) Oral at bedtime        LORazepam     Tablet 0.5 milliGRAM(s) Oral every 12 hours PRN      atorvastatin 40 milliGRAM(s) Oral at bedtime    potassium chloride    Tablet ER 40 milliEquivalent(s) Oral daily      FAMILY HISTORY:  Family history of heart disease (Father)  open heart surgery in his 30s, unsure of reason        Non-contributory    SOCIAL HISTORY:    current smoker    Allergies    Wellbutrin (Swelling)    Intolerances    	    REVIEW OF SYSTEMS:  CONSTITUTIONAL: No fever  EYES: No eye pain, visual disturbances, or discharge  ENMT:  No difficulty hearing, tinnitus  NECK: No pain or stiffness  RESPIRATORY: No cough, wheezing,  CARDIOVASCULAR: + chest pain, palpitations, passing out, dizziness, or leg swelling  GASTROINTESTINAL:  No nausea, vomiting, diarrhea or constipation. No melena.  GENITOURINARY: No dysuria, hematuria  NEUROLOGICAL: No stroke like symptoms  SKIN: No burning or lesions   ENDOCRINE: No heat or cold intolerance  MUSCULOSKELETAL: No joint pain or swelling  PSYCHIATRIC: No  anxiety, mood swings  HEME/LYMPH: No bleeding gums  ALLERGY AND IMMUNOLOGIC: No hives or eczema	    All other ROS negative    PHYSICAL EXAM:  T(C): 36.4 (03-11-23 @ 07:47), Max: 36.6 (03-10-23 @ 21:19)  HR: 69 (03-11-23 @ 07:47) (69 - 83)  BP: 113/87 (03-11-23 @ 07:47) (104/72 - 141/96)  RR: 18 (03-11-23 @ 07:47) (18 - 20)  SpO2: 90% (03-11-23 @ 07:47) (90% - 99%)  Wt(kg): --  I&O's Summary      Appearance: Normal	  HEENT:   Normal oral mucosa, EOMI	  Cardiovascular:  S1 S2, No JVD,    Respiratory: Lungs clear to auscultation	  Psychiatry: Alert  Gastrointestinal:  Soft, Non-tender, + BS	  Skin: No rashes   Neurologic: Non-focal  Extremities:  No edema  Vascular: Peripheral pulses palpable    	    	  	  CARDIAC MARKERS:  Labs personally reviewed by me                                  13.3   11.84 )-----------( 267      ( 10 Mar 2023 21:01 )             41.8     03-10    141  |  99  |  21  ----------------------------<  101<H>  3.5   |  29  |  0.69    Ca    9.7      10 Mar 2023 21:02    TPro  7.0  /  Alb  4.3  /  TBili  0.2  /  DBili  x   /  AST  15  /  ALT  22  /  AlkPhos  83  03-10          EKG: Personally reviewed by me - NSR  Radiology: Personally reviewed by me -     < from: Xray Chest 1 View- PORTABLE-Urgent (Xray Chest 1 View- PORTABLE-Urgent .) (03.10.23 @ 21:25) >  Heart/Vascular: Heart size is within normal limits.  Pulmonary: Clear lungs. No pleural effusion or pneumothorax.  Bones: No acute osseous abnormality.    Impression:  Clear lungs.    < end of copied text >  < from: Transthoracic Echocardiogram (11.22.21 @ 15:19) >  Ejection Fraction (Visual Estimate): 60 %  ------------------------------------------------------------------------  OBSERVATIONS:  Mitral Valve: Normal mitral valve. Minimal mitral  regurgitation.  Aortic Root: Normal aortic root.  Aortic Valve: Aortic valve leaflet morphology not well  visualized.  Left Atrium: Normal left atrium.  Left Ventricle: Endocardium not well visualized; grossly  normal left ventricular systolic function. Normal left  ventricular internal dimensions and wall thicknesses. Mild  diastolic dysfunction (Stage I).  Right Heart: Normal right atrium. The right ventricle is  not well visualized; grossly normal right ventricular  systolic function. Normal tricuspid valve. Minimal  tricuspid regurgitation. Normal pulmonic valve.  Pericardium/PleuraNormal pericardium with no pericardial  effusion.  ------------------------------------------------------------------------  CONCLUSIONS:  1. Normal mitral valve. Minimal mitral regurgitation.  2. Normal left ventricular internal dimensions and wall  thicknesses.  3. Endocardium not well visualized; grossly normal left  ventricular systolic function.  4. Mild diastolic dysfunction (Stage I).  5. The right ventricle is not well visualized; grossly  normal right ventricular systolic function.  *** Compared with echocardiogram of 12/6/2020, left  ventricular systolic function has improved.    < end of copied text >  < from: Cardiac Cath Lab - Adult (02.26.21 @ 16:51) >  DIAGNOSTIC IMPRESSIONS: Patent stents LAD  No other significant obstructive CAD  EF 40-45%    < end of copied text >  < from: Cardiac Cath Lab - Adult (12.03.20 @ 01:34) >  DIAGNOSTIC IMPRESSIONS: Successful PCI to severe stenosis/occlusion of mid  and proximal LAD using ROE THIAGO (3.0 and 4.0mm stents)    < end of copied text >      Assessment /Plan:     47F w/ PMHx of CAD s/p PCI of LAD in 2020, HTN, HLD, anxiety, GERD presents to ED c/o mid back pain (variations of squeezing and sharp sensations) onset 2-3 weeks ago. Pt states that symptoms are similar to when she had the heart attack. Pt was experiencing nausea a couple of days ago w/ diaphoresis. Pt states Sx worsens when staying still.     Problem/Plan - 1:  ·  Problem: Chest pain.   ·  Plan: TTE  Trop=<6; Repeat.  Ekg: NSR  CXR: with clear lungs  c/w asa, Brilinta, metoprolol  losartan, Lipitor  Echo from 2021 shows preserved EF (recovered from prior MI when EF was 40%), mild DD  Plan for ischemic eval with cath with NST pending review of TTE.    Problem/Plan - 2:  ·  Problem: CAD (coronary artery disease).   ·  Plan: - Continue with aspirin and Brilinta.  - s/p PCI to m/pLAD in 2020  - Repeat TTE pending to assess LVEF and r/o WMA    Problem/Plan - 3:  ·  Problem: Hypertension.   ·  Plan: low salt, low fat, low cholesterol.  - Continue with Metoprolol , losartan, HCTZ    Problem/Plan - 4:  ·  Problem: Hyperlipidemia.   ·  Plan: lipitor.    Problem/Plan - 5:  ·  Problem: Anxiety.   ·  Plan: - Ativan PRN.    Problem/Plan - 6:  ·  Problem: Need for prophylactic measure.   ·  Plan: DASH diet      Differential diagnosis and plan of care discussed with patient after the evaluation. Counseling on diet, nutritional counseling, weight management, exercise and medication compliance was done.   Advanced care planning/advanced directives discussed with patient/family. DNR status including forceful chest compressions to attempt to restart the heart, ventilator support/artificial breathing, electric shock, artificial nutrition, health care proxy, Molst form all discussed with pt. Pt wishes to consider. More than fifteen minutes spent on discussing advanced directives.     Arely HA-BC  Yunior Giang DO [facility]  Cardiovascular Medicine  [address] [place] [address] Dr, [address]  Available for call or text via [employer]  Office [phone]   DATE OF SERVICE: 03-11-23 @ 12:04    CHIEF COMPLAINT:Patient is a 47y old  Female who presents with a chief complaint of     HISTORY OF PRESENT ILLNESS: 47F w/ PMHx of CAD s/p PCI of LAD in 2020, HTN, HLD, anxiety, GERD presents to ED c/o mid back pain (variations of squeezing and sharp sensations) onset 2-3 weeks ago. Pt states that symptoms are similar to when she had the heart attack. Pt was experiencing nausea a couple of days ago w/ diaphoresis. Pt states Sx worsens when staying still.       PAST MEDICAL & SURGICAL HISTORY:  Hypertension      CAD (coronary artery disease)      Hyperlipidemia      Anxiety and depression      GERD (gastroesophageal reflux disease)      COVID-19      Benign ovarian tumor, left  and fibroid - partial hysterectomy      S/P cardiac cath  2 stents 12/2020      H/O meniscectomy of right knee              MEDICATIONS:  aspirin enteric coated 81 milliGRAM(s) Oral daily  chlorthalidone 25 milliGRAM(s) Oral daily  losartan 100 milliGRAM(s) Oral at bedtime        LORazepam     Tablet 0.5 milliGRAM(s) Oral every 12 hours PRN      atorvastatin 40 milliGRAM(s) Oral at bedtime    potassium chloride    Tablet ER 40 milliEquivalent(s) Oral daily      FAMILY HISTORY:  Family history of heart disease (Father)  open heart surgery in his 30s, unsure of reason        Non-contributory    SOCIAL HISTORY:    current smoker    Allergies    Wellbutrin (Swelling)    Intolerances    	    REVIEW OF SYSTEMS:  CONSTITUTIONAL: No fever  EYES: No eye pain, visual disturbances, or discharge  ENMT:  No difficulty hearing, tinnitus  NECK: No pain or stiffness  RESPIRATORY: No cough, wheezing,  CARDIOVASCULAR: + chest pain, palpitations, passing out, dizziness, or leg swelling  GASTROINTESTINAL:  No nausea, vomiting, diarrhea or constipation. No melena.  GENITOURINARY: No dysuria, hematuria  NEUROLOGICAL: No stroke like symptoms  SKIN: No burning or lesions   ENDOCRINE: No heat or cold intolerance  MUSCULOSKELETAL: No joint pain or swelling  PSYCHIATRIC: No  anxiety, mood swings  HEME/LYMPH: No bleeding gums  ALLERGY AND IMMUNOLOGIC: No hives or eczema	    All other ROS negative    PHYSICAL EXAM:  T(C): 36.4 (03-11-23 @ 07:47), Max: 36.6 (03-10-23 @ 21:19)  HR: 69 (03-11-23 @ 07:47) (69 - 83)  BP: 113/87 (03-11-23 @ 07:47) (104/72 - 141/96)  RR: 18 (03-11-23 @ 07:47) (18 - 20)  SpO2: 90% (03-11-23 @ 07:47) (90% - 99%)  Wt(kg): --  I&O's Summary      Appearance: Normal	  HEENT:   Normal oral mucosa, EOMI	  Cardiovascular:  S1 S2, No JVD,    Respiratory: Lungs clear to auscultation	  Psychiatry: Alert  Gastrointestinal:  Soft, Non-tender, + BS	  Skin: No rashes   Neurologic: Non-focal  Extremities:  No edema  Vascular: Peripheral pulses palpable    	    	  	  CARDIAC MARKERS:  Labs personally reviewed by me                                  13.3   11.84 )-----------( 267      ( 10 Mar 2023 21:01 )             41.8     03-10    141  |  99  |  21  ----------------------------<  101<H>  3.5   |  29  |  0.69    Ca    9.7      10 Mar 2023 21:02    TPro  7.0  /  Alb  4.3  /  TBili  0.2  /  DBili  x   /  AST  15  /  ALT  22  /  AlkPhos  83  03-10          EKG: Personally reviewed by me - NSR  Radiology: Personally reviewed by me -     < from: Xray Chest 1 View- PORTABLE-Urgent (Xray Chest 1 View- PORTABLE-Urgent .) (03.10.23 @ 21:25) >  Heart/Vascular: Heart size is within normal limits.  Pulmonary: Clear lungs. No pleural effusion or pneumothorax.  Bones: No acute osseous abnormality.    Impression:  Clear lungs.    < end of copied text >  < from: Transthoracic Echocardiogram (11.22.21 @ 15:19) >  Ejection Fraction (Visual Estimate): 60 %  ------------------------------------------------------------------------  OBSERVATIONS:  Mitral Valve: Normal mitral valve. Minimal mitral  regurgitation.  Aortic Root: Normal aortic root.  Aortic Valve: Aortic valve leaflet morphology not well  visualized.  Left Atrium: Normal left atrium.  Left Ventricle: Endocardium not well visualized; grossly  normal left ventricular systolic function. Normal left  ventricular internal dimensions and wall thicknesses. Mild  diastolic dysfunction (Stage I).  Right Heart: Normal right atrium. The right ventricle is  not well visualized; grossly normal right ventricular  systolic function. Normal tricuspid valve. Minimal  tricuspid regurgitation. Normal pulmonic valve.  Pericardium/PleuraNormal pericardium with no pericardial  effusion.  ------------------------------------------------------------------------  CONCLUSIONS:  1. Normal mitral valve. Minimal mitral regurgitation.  2. Normal left ventricular internal dimensions and wall  thicknesses.  3. Endocardium not well visualized; grossly normal left  ventricular systolic function.  4. Mild diastolic dysfunction (Stage I).  5. The right ventricle is not well visualized; grossly  normal right ventricular systolic function.  *** Compared with echocardiogram of 12/6/2020, left  ventricular systolic function has improved.    < end of copied text >  < from: Cardiac Cath Lab - Adult (02.26.21 @ 16:51) >  DIAGNOSTIC IMPRESSIONS: Patent stents LAD  No other significant obstructive CAD  EF 40-45%    < end of copied text >  < from: Cardiac Cath Lab - Adult (12.03.20 @ 01:34) >  DIAGNOSTIC IMPRESSIONS: Successful PCI to severe stenosis/occlusion of mid  and proximal LAD using ROE THIAGO (3.0 and 4.0mm stents)    < end of copied text >      Assessment /Plan:     47F w/ PMHx of CAD s/p PCI of LAD in 2020, HTN, HLD, anxiety, GERD presents to ED c/o mid back pain (variations of squeezing and sharp sensations) onset 2-3 weeks ago. Pt states that symptoms are similar to when she had the heart attack. Pt was experiencing nausea a couple of days ago w/ diaphoresis. Pt states Sx worsens when staying still.     Problem/Plan - 1:  ·  Problem: Chest pain.   ·  Plan: TTE  Trop=<6; Repeat.  Ekg: NSR  CXR: with clear lungs  c/w asa, Brilinta, metoprolol  losartan, Lipitor  Echo from 2021 shows preserved EF (recovered from prior MI when EF was 40%), mild DD  Plan for ischemic eval with cath with NST pending review of TTE.    Problem/Plan - 2:  ·  Problem: CAD (coronary artery disease).   ·  Plan: - Continue with aspirin and Brilinta.  - s/p PCI to m/pLAD in 2020  - TTE shows EF 73%, severe basal septal hypertrophy, normal LV systolic function, prob no WMA  - R/B of cardiac cath discussed with the patient. Verb understanding. Plan for cath tomorrow.    Problem/Plan - 3:  ·  Problem: Hypertension.   ·  Plan: low salt, low fat, low cholesterol.  - Continue with Metoprolol , losartan, HCTZ    Problem/Plan - 4:  ·  Problem: Hyperlipidemia.   ·  Plan: lipitor.    Problem/Plan - 5:  ·  Problem: Anxiety.   ·  Plan: - Ativan PRN.    Problem/Plan - 6:  ·  Problem: Need for prophylactic measure.   ·  Plan: DASH diet      Differential diagnosis and plan of care discussed with patient after the evaluation. Counseling on diet, nutritional counseling, weight management, exercise and medication compliance was done.   Advanced care planning/advanced directives discussed with patient/family. DNR status including forceful chest compressions to attempt to restart the heart, ventilator support/artificial breathing, electric shock, artificial nutrition, health care proxy, Molst form all discussed with pt. Pt wishes to consider. More than fifteen minutes spent on discussing advanced directives.     Arely Hill [facility]-BC  Yunior Giang DO [facility]  Cardiovascular Medicine  [address] [place] [address] Dr, [address]  Available for call or text via [employer] TE[employer]  Office [phone]

## 2023-03-11 NOTE — ED CDU PROVIDER SUBSEQUENT DAY NOTE - HISTORY
No interval changes since initial CDU provider note. Pt feels well without complaint. NAD VSS. no events on tele. Plan for echo in the setting of chest pain/back pain. Cardiology following.  - EVONNE Whitaker

## 2023-03-11 NOTE — H&P ADULT - NSHPLABSRESULTS_GEN_ALL_CORE
Labs personally reviewed:                          13.3   11.84 )-----------( 267      ( 10 Mar 2023 21:01 )             41.8       03-10    141  |  99  |  21  ----------------------------<  101<H>  3.5   |  29  |  0.69    Ca    9.7      10 Mar 2023 21:02    TPro  7.0  /  Alb  4.3  /  TBili  0.2  /  DBili  x   /  AST  15  /  ALT  22  /  AlkPhos  83  03-10      PT/INR - ( 10 Mar 2023 21:02 )   PT: 13.0 sec;   INR: 1.13 ratio         PTT - ( 10 Mar 2023 21:02 )  PTT:31.1 sec      ECHO:  Conclusions:  Endocardial visualization enhanced with intravenous  injection of Ultrasonic Enhancing Agent (Definity).  Normal left ventricular internal dimensions with severe  discrete basal septal hypertrophy (1.65 cm).  Normal left ventricular systolic function. Probably no  segmental wall motion abnormalities.

## 2023-03-11 NOTE — PATIENT PROFILE ADULT - FALL HARM RISK - UNIVERSAL INTERVENTIONS
Bed in lowest position, wheels locked, appropriate side rails in place/Call bell, personal items and telephone in reach/Instruct patient to call for assistance before getting out of bed or chair/Non-slip footwear when patient is out of bed/[place] to call system/Physically safe environment - no spills, clutter or unnecessary equipment/Purposeful Proactive Rounding/Room/bathroom lighting operational, light cord in reach

## 2023-03-12 LAB
ANION GAP SERPL CALC-SCNC: 13 MMOL/L — SIGNIFICANT CHANGE UP (ref 5–17)
BLD GP AB SCN SERPL QL: NEGATIVE — SIGNIFICANT CHANGE UP
BUN SERPL-MCNC: 11 MG/DL — SIGNIFICANT CHANGE UP (ref 7–23)
CALCIUM SERPL-MCNC: 9.2 MG/DL — SIGNIFICANT CHANGE UP (ref 8.4–10.5)
CHLORIDE SERPL-SCNC: 102 MMOL/L — SIGNIFICANT CHANGE UP (ref 96–108)
CO2 SERPL-SCNC: 27 MMOL/L — SIGNIFICANT CHANGE UP (ref 22–31)
CREAT SERPL-MCNC: 0.5 MG/DL — SIGNIFICANT CHANGE UP (ref 0.5–1.3)
EGFR: 115 ML/MIN/1.73M2 — SIGNIFICANT CHANGE UP
GLUCOSE SERPL-MCNC: 124 MG/DL — HIGH (ref 70–99)
HCT VFR BLD CALC: 42.2 % — SIGNIFICANT CHANGE UP (ref 34.5–45)
HGB BLD-MCNC: 13.3 G/DL — SIGNIFICANT CHANGE UP (ref 11.5–15.5)
INR BLD: 1.08 RATIO — SIGNIFICANT CHANGE UP (ref 0.88–1.16)
MCHC RBC-ENTMCNC: 27 PG — SIGNIFICANT CHANGE UP (ref 27–34)
MCHC RBC-ENTMCNC: 31.5 GM/DL — LOW (ref 32–36)
MCV RBC AUTO: 85.8 FL — SIGNIFICANT CHANGE UP (ref 80–100)
MRSA PCR RESULT.: SIGNIFICANT CHANGE UP
NRBC # BLD: 0 /100 WBCS — SIGNIFICANT CHANGE UP (ref 0–0)
PLATELET # BLD AUTO: 244 K/UL — SIGNIFICANT CHANGE UP (ref 150–400)
POTASSIUM SERPL-MCNC: 3 MMOL/L — LOW (ref 3.5–5.3)
POTASSIUM SERPL-SCNC: 3 MMOL/L — LOW (ref 3.5–5.3)
PROTHROM AB SERPL-ACNC: 12.4 SEC — SIGNIFICANT CHANGE UP (ref 10.5–13.4)
RBC # BLD: 4.92 M/UL — SIGNIFICANT CHANGE UP (ref 3.8–5.2)
RBC # FLD: 14 % — SIGNIFICANT CHANGE UP (ref 10.3–14.5)
RH IG SCN BLD-IMP: POSITIVE — SIGNIFICANT CHANGE UP
S AUREUS DNA NOSE QL NAA+PROBE: SIGNIFICANT CHANGE UP
SODIUM SERPL-SCNC: 142 MMOL/L — SIGNIFICANT CHANGE UP (ref 135–145)
WBC # BLD: 8.12 K/UL — SIGNIFICANT CHANGE UP (ref 3.8–10.5)
WBC # FLD AUTO: 8.12 K/UL — SIGNIFICANT CHANGE UP (ref 3.8–10.5)

## 2023-03-12 PROCEDURE — 99152 MOD SED SAME PHYS/QHP 5/>YRS: CPT

## 2023-03-12 PROCEDURE — 92928 PRQ TCAT PLMT NTRAC ST 1 LES: CPT | Mod: RC

## 2023-03-12 PROCEDURE — 92978 ENDOLUMINL IVUS OCT C 1ST: CPT | Mod: 26,RC

## 2023-03-12 PROCEDURE — 93010 ELECTROCARDIOGRAM REPORT: CPT

## 2023-03-12 PROCEDURE — 93454 CORONARY ARTERY ANGIO S&I: CPT | Mod: 26,59

## 2023-03-12 RX ORDER — SODIUM CHLORIDE 9 MG/ML
1000 INJECTION INTRAMUSCULAR; INTRAVENOUS; SUBCUTANEOUS
Refills: 0 | Status: DISCONTINUED | OUTPATIENT
Start: 2023-03-12 | End: 2023-03-13

## 2023-03-12 RX ORDER — POTASSIUM BICARBONATE 978 MG/1
25 TABLET, EFFERVESCENT ORAL ONCE
Refills: 0 | Status: COMPLETED | OUTPATIENT
Start: 2023-03-12 | End: 2023-03-12

## 2023-03-12 RX ORDER — CLOPIDOGREL BISULFATE 75 MG/1
75 TABLET, FILM COATED ORAL DAILY
Refills: 0 | Status: DISCONTINUED | OUTPATIENT
Start: 2023-03-13 | End: 2023-03-13

## 2023-03-12 RX ORDER — POTASSIUM CHLORIDE 20 MEQ
40 PACKET (EA) ORAL EVERY 4 HOURS
Refills: 0 | Status: DISCONTINUED | OUTPATIENT
Start: 2023-03-12 | End: 2023-03-12

## 2023-03-12 RX ORDER — SODIUM CHLORIDE 9 MG/ML
250 INJECTION INTRAMUSCULAR; INTRAVENOUS; SUBCUTANEOUS ONCE
Refills: 0 | Status: COMPLETED | OUTPATIENT
Start: 2023-03-12 | End: 2023-03-12

## 2023-03-12 RX ORDER — CLOPIDOGREL BISULFATE 75 MG/1
1 TABLET, FILM COATED ORAL
Qty: 90 | Refills: 3
Start: 2023-03-12 | End: 2024-03-05

## 2023-03-12 RX ORDER — AMLODIPINE BESYLATE 2.5 MG/1
5 TABLET ORAL DAILY
Refills: 0 | Status: DISCONTINUED | OUTPATIENT
Start: 2023-03-12 | End: 2023-03-13

## 2023-03-12 RX ORDER — CHLORHEXIDINE GLUCONATE 213 G/1000ML
1 SOLUTION TOPICAL DAILY
Refills: 0 | Status: DISCONTINUED | OUTPATIENT
Start: 2023-03-12 | End: 2023-03-13

## 2023-03-12 RX ORDER — SENNA PLUS 8.6 MG/1
2 TABLET ORAL AT BEDTIME
Refills: 0 | Status: DISCONTINUED | OUTPATIENT
Start: 2023-03-12 | End: 2023-03-13

## 2023-03-12 RX ORDER — ACETAMINOPHEN 500 MG
1000 TABLET ORAL ONCE
Refills: 0 | Status: COMPLETED | OUTPATIENT
Start: 2023-03-12 | End: 2023-03-12

## 2023-03-12 RX ORDER — ASPIRIN/CALCIUM CARB/MAGNESIUM 324 MG
81 TABLET ORAL DAILY
Refills: 0 | Status: DISCONTINUED | OUTPATIENT
Start: 2023-03-13 | End: 2023-03-13

## 2023-03-12 RX ADMIN — LOSARTAN POTASSIUM 100 MILLIGRAM(S): 100 TABLET, FILM COATED ORAL at 21:06

## 2023-03-12 RX ADMIN — CHLORHEXIDINE GLUCONATE 1 APPLICATION(S): 213 SOLUTION TOPICAL at 13:50

## 2023-03-12 RX ADMIN — ATORVASTATIN CALCIUM 40 MILLIGRAM(S): 80 TABLET, FILM COATED ORAL at 21:06

## 2023-03-12 RX ADMIN — POTASSIUM BICARBONATE 25 MILLIEQUIVALENT(S): 978 TABLET, EFFERVESCENT ORAL at 10:40

## 2023-03-12 RX ADMIN — Medication 0.5 MILLIGRAM(S): at 07:30

## 2023-03-12 RX ADMIN — POTASSIUM BICARBONATE 25 MILLIEQUIVALENT(S): 978 TABLET, EFFERVESCENT ORAL at 09:37

## 2023-03-12 RX ADMIN — Medication 1000 MILLIGRAM(S): at 13:45

## 2023-03-12 RX ADMIN — SENNA PLUS 2 TABLET(S): 8.6 TABLET ORAL at 21:06

## 2023-03-12 RX ADMIN — SODIUM CHLORIDE 75 MILLILITER(S): 9 INJECTION INTRAMUSCULAR; INTRAVENOUS; SUBCUTANEOUS at 09:21

## 2023-03-12 RX ADMIN — Medication 400 MILLIGRAM(S): at 11:25

## 2023-03-12 RX ADMIN — SODIUM CHLORIDE 100 MILLILITER(S): 9 INJECTION INTRAMUSCULAR; INTRAVENOUS; SUBCUTANEOUS at 09:46

## 2023-03-12 RX ADMIN — Medication 40 MILLIEQUIVALENT(S): at 13:44

## 2023-03-12 RX ADMIN — Medication 0.5 MILLIGRAM(S): at 19:02

## 2023-03-12 RX ADMIN — SODIUM CHLORIDE 750 MILLILITER(S): 9 INJECTION INTRAMUSCULAR; INTRAVENOUS; SUBCUTANEOUS at 09:21

## 2023-03-12 RX ADMIN — Medication 5 MILLIGRAM(S): at 21:06

## 2023-03-12 NOTE — PROGRESS NOTE ADULT - SUBJECTIVE AND OBJECTIVE BOX
Removal of Femoral Sheath    Pulses in the (right) lower extremity are (palpable). The patient was placed in the supine position. The insertion site was identified and the sutures were removed per protocol.  The _6___ [place] femoral sheath was then removed. Direct pressure was applied for  _22_____ minutes.     Monitoring of the (right) groin and both lower extremities including neuro-vascular checks and vital signs every 15 minutes x 4, then every 30 minutes x 2, then every 1 hour was ordered.    Complications: None    Comments:        Removal of Femoral Sheath    Pulses in the (right) lower extremity are (palpable). The patient was placed in the supine position. The insertion site was identified and the sutures were removed per protocol.  The _6___ [place] femoral sheath was then removed. Direct pressure was applied for  _22_____ minutes.     Monitoring of the (right) groin and both lower extremities including neuro-vascular checks and vital signs every 15 minutes x 4, then every 30 minutes x 2, then every 1 hour was ordered.    Complications: None    Comments: 3/12/23 s/p [facility] TIHAGO to PRox RCA 80% via RFA 6fr sheath with suture   ON ASA Plavix D/C Sheath at 1100 . Bedrest x 4 hours post sheath . May sit up at 1330 and Ambulate at 1530 if groin is stable  Check site for bleeding or hematoma . K+ 3.0 Repleated with klyte 25meq  po x 2 . IV Tylenol x 1 for sheath removal pain . continue to monitor closely. Emotional support provided .

## 2023-03-12 NOTE — PROGRESS NOTE ADULT - SUBJECTIVE AND OBJECTIVE BOX
Patient is a 47y old  Female who presents with a chief complaint of back pain (12 Mar 2023 11:48)      INTERVAL HPI/OVERNIGHT EVENTS:  T(C): 36.4 (03-12-23 @ 20:00), Max: 36.5 (03-12-23 @ 07:54)  HR: 90 (03-12-23 @ 20:00) (64 - 90)  BP: 127/91 (03-12-23 @ 20:00) (108/64 - 159/91)  RR: 18 (03-12-23 @ 20:00) (16 - 18)  SpO2: 93% (03-12-23 @ 20:00) (93% - 99%)  Wt(kg): --  I&O's Summary    12 Mar 2023 07:01  -  12 Mar 2023 23:51  --------------------------------------------------------  IN: 120 mL / OUT: 0 mL / NET: 120 mL        PAST MEDICAL & SURGICAL HISTORY:  Hypertension      CAD (coronary artery disease)      Hyperlipidemia      Anxiety and depression      GERD (gastroesophageal reflux disease)      COVID-19      Benign ovarian tumor, left  and fibroid - partial hysterectomy      S/P cardiac cath  2 stents 12/2020      H/O meniscectomy of right knee          SOCIAL HISTORY  Alcohol:  Tobacco:  Illicit substance use:    FAMILY HISTORY:    REVIEW OF SYSTEMS:  CONSTITUTIONAL: No fever, weight loss, or fatigue  EYES: No eye pain, visual disturbances, or discharge  ENMT:  No difficulty hearing, tinnitus, vertigo; No sinus or throat pain  NECK: No pain or stiffness  RESPIRATORY: No cough, wheezing, chills or hemoptysis; No shortness of breath  CARDIOVASCULAR: No chest pain, palpitations, dizziness, or leg swelling  GASTROINTESTINAL: No abdominal or epigastric pain. No nausea, vomiting, or hematemesis; No diarrhea or constipation. No melena or hematochezia.  GENITOURINARY: No dysuria, frequency, hematuria, or incontinence  NEUROLOGICAL: No headaches, memory loss, loss of strength, numbness, or tremors  SKIN: No itching, burning, rashes, or lesions   LYMPH NODES: No enlarged glands  ENDOCRINE: No heat or cold intolerance; No hair loss  MUSCULOSKELETAL: No joint pain or swelling; No muscle, back, or extremity pain  PSYCHIATRIC: No depression, anxiety, mood swings, or difficulty sleeping  HEME/LYMPH: No easy bruising, or bleeding gums  ALLERY AND IMMUNOLOGIC: No hives or eczema    RADIOLOGY & ADDITIONAL TESTS:    Imaging Personally Reviewed:  [ ] YES  [ ] NO    Consultant(s) Notes Reviewed:  [ ] YES  [ ] NO    PHYSICAL EXAM:  GENERAL: NAD, well-groomed, well-developed  HEAD:  Atraumatic, Normocephalic  EYES: EOMI, PERRLA, conjunctiva and sclera clear  ENMT: No tonsillar erythema, exudates, or enlargement; Moist mucous membranes, Good dentition, No lesions  NECK: Supple, No JVD, Normal thyroid  NERVOUS SYSTEM:  Alert & Oriented X3, Good concentration; Motor Strength 5/5 B/L upper and lower extremities; DTRs 2+ intact and symmetric  CHEST/LUNG: Clear to percussion bilaterally; No rales, rhonchi, wheezing, or rubs  HEART: Regular rate and rhythm; No murmurs, rubs, or gallops  ABDOMEN: Soft, Nontender, Nondistended; Bowel sounds present  EXTREMITIES:  2+ Peripheral Pulses, No clubbing, cyanosis, or edema  LYMPH: No lymphadenopathy noted  SKIN: No rashes or lesions    LABS:                        13.3   8.12  )-----------( 244      ( 12 Mar 2023 07:14 )             42.2     03-12    142  |  102  |  11  ----------------------------<  124<H>  3.0<L>   |  27  |  0.50    Ca    9.2      12 Mar 2023 07:14      PT/INR - ( 12 Mar 2023 07:14 )   PT: 12.4 sec;   INR: 1.08 ratio             CAPILLARY BLOOD GLUCOSE                MEDICATIONS  (STANDING):  amLODIPine   Tablet 5 milliGRAM(s) Oral daily  atorvastatin 40 milliGRAM(s) Oral at bedtime  chlorhexidine 2% Cloths 1 Application(s) Topical daily  losartan 100 milliGRAM(s) Oral at bedtime  potassium chloride    Tablet ER 40 milliEquivalent(s) Oral daily  senna 2 Tablet(s) Oral at bedtime  sodium chloride 0.9%. 1000 milliLiter(s) (75 mL/Hr) IV Continuous <Continuous>  sodium chloride 0.9%. 1000 milliLiter(s) (100 mL/Hr) IV Continuous <Continuous>    MEDICATIONS  (PRN):  LORazepam     Tablet 0.5 milliGRAM(s) Oral two times a day PRN Anxiety  melatonin 5 milliGRAM(s) Oral at bedtime PRN Sleep      Care Discussed with Consultants/Other Providers [ ] YES  [ ] NO Patient is a 47y old  Female who presents with a chief complaint of back pain (12 Mar 2023 11:48)      INTERVAL HPI/OVERNIGHT EVENTS: s/p cardiac cath with stent placement in RCA  T(C): 36.4 (03-12-23 @ 20:00), Max: 36.5 (03-12-23 @ 07:54)  HR: 90 (03-12-23 @ 20:00) (64 - 90)  BP: 127/91 (03-12-23 @ 20:00) (108/64 - 159/91)  RR: 18 (03-12-23 @ 20:00) (16 - 18)  SpO2: 93% (03-12-23 @ 20:00) (93% - 99%)  Wt(kg): --  I&O's Summary    12 Mar 2023 07:01  -  12 Mar 2023 23:51  --------------------------------------------------------  IN: 120 mL / OUT: 0 mL / NET: 120 mL        PAST MEDICAL & SURGICAL HISTORY:  Hypertension      CAD (coronary artery disease)      Hyperlipidemia      Anxiety and depression      GERD (gastroesophageal reflux disease)      COVID-19      Benign ovarian tumor, left  and fibroid - partial hysterectomy      S/P cardiac cath  2 stents 12/2020      H/O meniscectomy of right knee          SOCIAL HISTORY  Alcohol:  Tobacco:  Illicit substance use:    FAMILY HISTORY:    REVIEW OF SYSTEMS:  CONSTITUTIONAL: No fever, weight loss, or fatigue  EYES: No eye pain, visual disturbances, or discharge  ENMT:  No difficulty hearing, tinnitus, vertigo; No sinus or throat pain  NECK: No pain or stiffness  RESPIRATORY: No cough, wheezing, chills or hemoptysis; No shortness of breath  CARDIOVASCULAR: No chest pain, palpitations, dizziness, or leg swelling  GASTROINTESTINAL: No abdominal or epigastric pain. No nausea, vomiting, or hematemesis; No diarrhea or constipation. No melena or hematochezia.  GENITOURINARY: No dysuria, frequency, hematuria, or incontinence  NEUROLOGICAL: No headaches, memory loss, loss of strength, numbness, or tremors  SKIN: No itching, burning, rashes, or lesions   LYMPH NODES: No enlarged glands  ENDOCRINE: No heat or cold intolerance; No hair loss  MUSCULOSKELETAL: No joint pain or swelling; No muscle, back, or extremity pain  PSYCHIATRIC: No depression, anxiety, mood swings, or difficulty sleeping  HEME/LYMPH: No easy bruising, or bleeding gums  ALLERY AND IMMUNOLOGIC: No hives or eczema    RADIOLOGY & ADDITIONAL TESTS:    Imaging Personally Reviewed:  [ ] YES  [ ] NO    Consultant(s) Notes Reviewed:  [ ] YES  [ ] NO    PHYSICAL EXAM:  GENERAL: NAD, well-groomed, well-developed  HEAD:  Atraumatic, Normocephalic  EYES: EOMI, PERRLA, conjunctiva and sclera clear  ENMT: No tonsillar erythema, exudates, or enlargement; Moist mucous membranes, Good dentition, No lesions  NECK: Supple, No JVD, Normal thyroid  NERVOUS SYSTEM:  Alert & Oriented X3, Good concentration; Motor Strength 5/5 B/L upper and lower extremities; DTRs 2+ intact and symmetric  CHEST/LUNG: Clear to percussion bilaterally; No rales, rhonchi, wheezing, or rubs  HEART: Regular rate and rhythm; No murmurs, rubs, or gallops  ABDOMEN: Soft, Nontender, Nondistended; Bowel sounds present  EXTREMITIES:  2+ Peripheral Pulses, No clubbing, cyanosis, or edema  LYMPH: No lymphadenopathy noted  SKIN: No rashes or lesions    LABS:                        13.3   8.12  )-----------( 244      ( 12 Mar 2023 07:14 )             42.2     03-12    142  |  102  |  11  ----------------------------<  124<H>  3.0<L>   |  27  |  0.50    Ca    9.2      12 Mar 2023 07:14      PT/INR - ( 12 Mar 2023 07:14 )   PT: 12.4 sec;   INR: 1.08 ratio             CAPILLARY BLOOD GLUCOSE                MEDICATIONS  (STANDING):  amLODIPine   Tablet 5 milliGRAM(s) Oral daily  atorvastatin 40 milliGRAM(s) Oral at bedtime  chlorhexidine 2% Cloths 1 Application(s) Topical daily  losartan 100 milliGRAM(s) Oral at bedtime  potassium chloride    Tablet ER 40 milliEquivalent(s) Oral daily  senna 2 Tablet(s) Oral at bedtime  sodium chloride 0.9%. 1000 milliLiter(s) (75 mL/Hr) IV Continuous <Continuous>  sodium chloride 0.9%. 1000 milliLiter(s) (100 mL/Hr) IV Continuous <Continuous>    MEDICATIONS  (PRN):  LORazepam     Tablet 0.5 milliGRAM(s) Oral two times a day PRN Anxiety  melatonin 5 milliGRAM(s) Oral at bedtime PRN Sleep      Care Discussed with Consultants/Other Providers [ ] YES  [ ] NO

## 2023-03-12 NOTE — PROGRESS NOTE ADULT - SUBJECTIVE AND OBJECTIVE BOX
DATE OF SERVICE: 03-12-23 @ 09:58    Patient is a 47y old  Female who presents with a chief complaint of back pain (11 Mar 2023 15:58)      INTERVAL HISTORY: Feels ok.     REVIEW OF SYSTEMS:  CONSTITUTIONAL: No weakness  EYES/ENT: No visual changes;  No throat pain   NECK: No pain or stiffness  RESPIRATORY: No cough, wheezing; No shortness of breath  CARDIOVASCULAR: No chest pain or palpitations  GASTROINTESTINAL: No abdominal  pain. No nausea, vomiting, or hematemesis  GENITOURINARY: No dysuria, frequency or hematuria  NEUROLOGICAL: No stroke like symptoms  SKIN: No rashes    TELEMETRY Personally reviewed: SR  PVCs  	  MEDICATIONS:  chlorthalidone 25 milliGRAM(s) Oral daily  losartan 100 milliGRAM(s) Oral at bedtime        PHYSICAL EXAM:  T(C): 36.5 (03-12-23 @ 07:56), Max: 36.6 (03-11-23 @ 20:00)  HR: 71 (03-12-23 @ 09:30) (71 - 83)  BP: 141/91 (03-12-23 @ 09:30) (111/76 - 141/91)  RR: 18 (03-12-23 @ 09:30) (16 - 18)  SpO2: 95% (03-12-23 @ 09:30) (95% - 97%)  Wt(kg): --  I&O's Summary    Height (cm): 172.7 (03-11 @ 18:08)  Weight (kg): 97.6 (03-11 @ 18:08)  BMI (kg/m2): 32.7 (03-11 @ 18:08)  BSA (m2): 2.11 (03-11 @ 18:08)    Appearance: In no distress	  HEENT:    PERRL, EOMI	  Cardiovascular:  S1 S2, No JVD  Respiratory: Lungs clear to auscultation	  Gastrointestinal:  Soft, Non-tender, + BS	  Vascularature:  No edema of LE  Psychiatric: Appropriate affect   Neuro: no acute focal deficits                               13.3   8.12  )-----------( 244      ( 12 Mar 2023 07:14 )             42.2     03-12    142  |  102  |  11  ----------------------------<  124<H>  3.0<L>   |  27  |  0.50    Ca    9.2      12 Mar 2023 07:14    TPro  7.0  /  Alb  4.3  /  TBili  0.2  /  DBili  x   /  AST  15  /  ALT  22  /  AlkPhos  83  03-10        Labs personally reviewed      ASSESSMENT/PLAN: 	    47F w/ PMHx of CAD s/p PCI of LAD in 2020, HTN, HLD, anxiety, GERD presents to ED c/o mid back pain (variations of squeezing and sharp sensations) onset 2-3 weeks ago. Pt states that symptoms are similar to when she had the heart attack. Pt was experiencing nausea a couple of days ago w/ diaphoresis. Pt states Sx worsens when staying still.     Problem/Plan - 1:  ·  Problem: Chest pain.   ·  Plan: s/p Cath s/p PCI  Trop=<6  Ekg: NSR  CXR: with clear lungs  c/w asa, Brilinta, metoprolol  losartan, Lipitor  Echo from 2021 shows preserved EF (recovered from prior MI when EF was 40%), mild DD  TTE shows EF 73%, severe basal septal hypertrophy, normal LV systolic function, prob no WMA  Now s/p CAth with PCI to RCA      Problem/Plan - 2:  ·  Problem: CAD (coronary artery disease).   ·  Plan: - Continue with aspirin and Brilinta.  - s/p PCI to m/pLAD in 2020  - TTE shows EF 73%, severe basal septal hypertrophy, normal LV systolic function, prob no WMA  - s/p Cath with PCI to RCA  - c/w ASA, Plavix, Atorvastatin  - Will consider Repatha as OP. CCB for poss microvascular dz    Problem/Plan - 3:  ·  Problem: Hypertension.   ·  Plan: low salt, low fat, low cholesterol.  - Continue with Metoprolol , losartan, HCTZ    Problem/Plan - 4:  ·  Problem: Hyperlipidemia.   ·  Plan: lipitor.    Problem/Plan - 5:  ·  Problem: Anxiety.   ·  Plan: - Ativan PRN.    Problem/Plan - 6:  ·  Problem: Need for prophylactic measure.   ·  Plan: DASH diet        Arely Hill, AG-NP   Yunior Giang, DO [facility]  Cardiovascular Medicine  [address], [address]  Available through call or text on [employer]  Office: [phone]   DATE OF SERVICE: 03-12-23 @ 09:58    Patient is a 47y old  Female who presents with a chief complaint of back pain (11 Mar 2023 15:58)      INTERVAL HISTORY: Feels ok.     REVIEW OF SYSTEMS:  CONSTITUTIONAL: No weakness  EYES/ENT: No visual changes;  No throat pain   NECK: No pain or stiffness  RESPIRATORY: No cough, wheezing; No shortness of breath  CARDIOVASCULAR: No chest pain or palpitations  GASTROINTESTINAL: No abdominal  pain. No nausea, vomiting, or hematemesis  GENITOURINARY: No dysuria, frequency or hematuria  NEUROLOGICAL: No stroke like symptoms  SKIN: No rashes    TELEMETRY Personally reviewed: SR  PVCs  	  MEDICATIONS:  chlorthalidone 25 milliGRAM(s) Oral daily  losartan 100 milliGRAM(s) Oral at bedtime        PHYSICAL EXAM:  T(C): 36.5 (03-12-23 @ 07:56), Max: 36.6 (03-11-23 @ 20:00)  HR: 71 (03-12-23 @ 09:30) (71 - 83)  BP: 141/91 (03-12-23 @ 09:30) (111/76 - 141/91)  RR: 18 (03-12-23 @ 09:30) (16 - 18)  SpO2: 95% (03-12-23 @ 09:30) (95% - 97%)  Wt(kg): --  I&O's Summary    Height (cm): 172.7 (03-11 @ 18:08)  Weight (kg): 97.6 (03-11 @ 18:08)  BMI (kg/m2): 32.7 (03-11 @ 18:08)  BSA (m2): 2.11 (03-11 @ 18:08)    Appearance: In no distress	  HEENT:    PERRL, EOMI	  Cardiovascular:  S1 S2, No JVD  Respiratory: Lungs clear to auscultation	  Gastrointestinal:  Soft, Non-tender, + BS	  Vascularature:  No edema of LE  Psychiatric: Appropriate affect   Neuro: no acute focal deficits                               13.3   8.12  )-----------( 244      ( 12 Mar 2023 07:14 )             42.2     03-12    142  |  102  |  11  ----------------------------<  124<H>  3.0<L>   |  27  |  0.50    Ca    9.2      12 Mar 2023 07:14    TPro  7.0  /  Alb  4.3  /  TBili  0.2  /  DBili  x   /  AST  15  /  ALT  22  /  AlkPhos  83  03-10        Labs personally reviewed      ASSESSMENT/PLAN: 	    47F w/ PMHx of CAD s/p PCI of LAD in 2020, HTN, HLD, anxiety, GERD presents to ED c/o mid back pain (variations of squeezing and sharp sensations) onset 2-3 weeks ago. Pt states that symptoms are similar to when she had the heart attack. Pt was experiencing nausea a couple of days ago w/ diaphoresis. Pt states Sx worsens when staying still.     Problem/Plan - 1:  ·  Problem: Chest pain.   ·  Plan: s/p Cath s/p PCI  Trop=<6  Ekg: NSR  CXR: with clear lungs  Echo from 2021 shows preserved EF (recovered from prior MI when EF was 40%), mild DD  TTE shows EF 73%, severe basal septal hypertrophy, normal LV systolic function, prob no WMA  Now s/p CAth with PCI to RCA  - c/w ASA, plavix, atorvastatin  - Will DC diuretic and start amlodipine for suspected microvascular disease      Problem/Plan - 2:  ·  Problem: CAD (coronary artery disease).   ·  Plan: - Continue with aspirin and Brilinta.  - s/p PCI to m/pLAD in 2020  - TTE shows EF 73%, severe basal septal hypertrophy, normal LV systolic function, prob no WMA  - s/p Cath with PCI to RCA  - c/w ASA, Plavix, Atorvastatin  - Will consider Repatha as OP.     Problem/Plan - 3:  ·  Problem: Hypertension.   ·  Plan: low salt, low fat, low cholesterol.  - Continue with losartan  - Will DC diuretic and start amlodipine for suspected microvascular disease    Problem/Plan - 4:  ·  Problem: Hyperlipidemia.   ·  Plan: lipitor.  - Will consider Repatha as OP.     Problem/Plan - 5:  ·  Problem: Anxiety.   ·  Plan: - Ativan PRN.    Problem/Plan - 6:  ·  Problem: Need for prophylactic measure.   ·  Plan: DASH diet        Arely Hill, AG-NP   Yunior Giang DO [facility]  Cardiovascular Medicine  [address] [facility], [address]  Available through call or text on [employer] TE[employer]  Office: [phone]

## 2023-03-12 NOTE — CHART NOTE - NSCHARTNOTEFT_GEN_A_CORE
Post sheath removal pt complained tenderness upon palpation to right groin soft hematoma 2x2cm Manual pressure held x 10 minutes now site soft intact +ecchymosis no hematoma no bruit noted by auscultation at this time . +ppx 4 Vitals 119/77 HR 69 RR 16 O2 sats 98% RA . MD Giang at bedside . Questions answers discussed with patient . NO acute distress noted. Plavix 75mg po daily sent to pt pharmacy [facility]. Pt stable for transfer to floor.     Treva Elizabeth Np   Cardiology   [phone] Post sheath removal pt complained tenderness upon palpation to right groin soft hematoma 2x2cm Manual pressure held x 10 minutes now site soft intact +ecchymosis no hematoma no bruit noted by auscultation at this time . +ppx 4 Vitals 119/77 HR 69 RR 16 O2 sats 98% RA . MD Giang at bedside . Questions answers discussed with patient . NO acute distress noted. Plavix 75mg po daily sent to pt pharmacy [facility]. Pt stable for transfer to floor.     Treva Elizabeth Np   Cardiology   [phone]  Addendum at 8847 pt having difficulty urinating on bedpan agreed to straight catheter 400cc clear ambrocio urine noted with relief. Stable for transfer to floor at this time.   no distress noted. Right groin with dsg soft no hematoma noted .+PPx 4 .     Treva Elizabeth NP   Cardiology   [phone]

## 2023-03-12 NOTE — PROGRESS NOTE ADULT - ASSESSMENT
A/P    47 F PMHx of CAD s/p PCI of LAD in 2020, HTN, HLD, anxiety, GERD admitted for ACS work up.       # CAD (coronary artery disease). / s/p THIAGO in RCA   start asa/plavix if cleared by interventional cardio   -denies any CP   cath site : looks ok , no hematoma     # Hypertension.   ·  Plan: c/w losartan 100 mg daily  c/w Chlorthalidone 25 mg.    # Hyperlipidemia.   ·  Plan: lipid panel reviewed  - c/w Atorvastatin 40 mg HS.    dispo: start asa and plavix or brilinta ( as per recommended by cardio) , if cleared by cardio , plan for d/c home in am

## 2023-03-13 ENCOUNTER — TRANSCRIPTION ENCOUNTER (OUTPATIENT)
Age: 48
End: 2023-03-13

## 2023-03-13 VITALS
RESPIRATION RATE: 18 BRPM | SYSTOLIC BLOOD PRESSURE: 108 MMHG | OXYGEN SATURATION: 96 % | HEART RATE: 93 BPM | DIASTOLIC BLOOD PRESSURE: 71 MMHG | TEMPERATURE: 98 F

## 2023-03-13 LAB
ANION GAP SERPL CALC-SCNC: 12 MMOL/L — SIGNIFICANT CHANGE UP (ref 5–17)
BUN SERPL-MCNC: 9 MG/DL — SIGNIFICANT CHANGE UP (ref 7–23)
CALCIUM SERPL-MCNC: 9.3 MG/DL — SIGNIFICANT CHANGE UP (ref 8.4–10.5)
CHLORIDE SERPL-SCNC: 102 MMOL/L — SIGNIFICANT CHANGE UP (ref 96–108)
CO2 SERPL-SCNC: 28 MMOL/L — SIGNIFICANT CHANGE UP (ref 22–31)
CREAT SERPL-MCNC: 0.51 MG/DL — SIGNIFICANT CHANGE UP (ref 0.5–1.3)
EGFR: 114 ML/MIN/1.73M2 — SIGNIFICANT CHANGE UP
GLUCOSE SERPL-MCNC: 98 MG/DL — SIGNIFICANT CHANGE UP (ref 70–99)
POTASSIUM SERPL-MCNC: 3.4 MMOL/L — LOW (ref 3.5–5.3)
POTASSIUM SERPL-SCNC: 3.4 MMOL/L — LOW (ref 3.5–5.3)
SODIUM SERPL-SCNC: 142 MMOL/L — SIGNIFICANT CHANGE UP (ref 135–145)

## 2023-03-13 PROCEDURE — 80061 LIPID PANEL: CPT

## 2023-03-13 PROCEDURE — C1887: CPT

## 2023-03-13 PROCEDURE — C1874: CPT

## 2023-03-13 PROCEDURE — C1894: CPT

## 2023-03-13 PROCEDURE — 93005 ELECTROCARDIOGRAM TRACING: CPT

## 2023-03-13 PROCEDURE — 85025 COMPLETE CBC W/AUTO DIFF WBC: CPT

## 2023-03-13 PROCEDURE — 86901 BLOOD TYPING SEROLOGIC RH(D): CPT

## 2023-03-13 PROCEDURE — 80048 BASIC METABOLIC PNL TOTAL CA: CPT

## 2023-03-13 PROCEDURE — 87641 MR-STAPH DNA AMP PROBE: CPT

## 2023-03-13 PROCEDURE — 86850 RBC ANTIBODY SCREEN: CPT

## 2023-03-13 PROCEDURE — 85610 PROTHROMBIN TIME: CPT

## 2023-03-13 PROCEDURE — 82553 CREATINE MB FRACTION: CPT

## 2023-03-13 PROCEDURE — 86900 BLOOD TYPING SEROLOGIC ABO: CPT

## 2023-03-13 PROCEDURE — C8929: CPT

## 2023-03-13 PROCEDURE — 85027 COMPLETE CBC AUTOMATED: CPT

## 2023-03-13 PROCEDURE — 87637 SARSCOV2&INF A&B&RSV AMP PRB: CPT

## 2023-03-13 PROCEDURE — C1769: CPT

## 2023-03-13 PROCEDURE — C9600: CPT | Mod: RC

## 2023-03-13 PROCEDURE — 36415 COLL VENOUS BLD VENIPUNCTURE: CPT

## 2023-03-13 PROCEDURE — 84484 ASSAY OF TROPONIN QUANT: CPT

## 2023-03-13 PROCEDURE — 93454 CORONARY ARTERY ANGIO S&I: CPT | Mod: 59

## 2023-03-13 PROCEDURE — 71045 X-RAY EXAM CHEST 1 VIEW: CPT

## 2023-03-13 PROCEDURE — 83036 HEMOGLOBIN GLYCOSYLATED A1C: CPT

## 2023-03-13 PROCEDURE — 83880 ASSAY OF NATRIURETIC PEPTIDE: CPT

## 2023-03-13 PROCEDURE — 99285 EMERGENCY DEPT VISIT HI MDM: CPT

## 2023-03-13 PROCEDURE — 92978 ENDOLUMINL IVUS OCT C 1ST: CPT | Mod: RC

## 2023-03-13 PROCEDURE — 84702 CHORIONIC GONADOTROPIN TEST: CPT

## 2023-03-13 PROCEDURE — 85730 THROMBOPLASTIN TIME PARTIAL: CPT

## 2023-03-13 PROCEDURE — 80053 COMPREHEN METABOLIC PANEL: CPT

## 2023-03-13 PROCEDURE — 87640 STAPH A DNA AMP PROBE: CPT

## 2023-03-13 RX ORDER — CHLORTHALIDONE 50 MG
1 TABLET ORAL
Qty: 0 | Refills: 0 | DISCHARGE

## 2023-03-13 RX ORDER — AMLODIPINE BESYLATE 2.5 MG/1
1 TABLET ORAL
Qty: 30 | Refills: 0
Start: 2023-03-13 | End: 2023-04-11

## 2023-03-13 RX ADMIN — CLOPIDOGREL BISULFATE 75 MILLIGRAM(S): 75 TABLET, FILM COATED ORAL at 11:13

## 2023-03-13 RX ADMIN — Medication 40 MILLIEQUIVALENT(S): at 11:13

## 2023-03-13 RX ADMIN — Medication 81 MILLIGRAM(S): at 11:13

## 2023-03-13 RX ADMIN — AMLODIPINE BESYLATE 5 MILLIGRAM(S): 2.5 TABLET ORAL at 05:05

## 2023-03-13 NOTE — DISCHARGE NOTE PROVIDER - HOSPITAL COURSE
47 F PMHx of CAD s/p PCI of LAD in 2020, HTN, HLD, anxiety, GERD admitted for ACS work up. PT underwent ->  3/12/23 s/p LHC with  THIAGO to PRox RCA 80%.Pt to continue with  asa and plavix . Pt ambulating well with no complaints of pain , site C/D/I  no hematoma. Pt medically cleared for discharge and can follow up with his cardiologist.       CAD (coronary artery disease).  -s/p THIAGO in RCA    cath site : looks ok , no hematoma   Echo from 2021 shows preserved EF (recovered from prior MI when EF was 40%), mild DD  TTE shows EF 73%, severe basal septal hypertrophy, normal LV systolic function, prob no WMA  - c/w ASA, plavix, atorvastatin  - Will DC diuretic and start amlodipine for suspected microvascular disease       Hypertension.   ·  Plan: c/w losartan 100 mg daily       Hyperlipidemia.   ·  Plan: lipid panel reviewed  - c/w Atorvastatin 40 mg HS.   47 F PMHx of CAD s/p PCI of LAD in 2020, HTN, HLD, anxiety, GERD admitted for ACS work up. PT underwent ->  3/12/23 s/p LHC with  THIAGO to PRox RCA 80%.Pt to continue with  asa and plavix . Pt ambulating well with no complaints of pain , site C/D/I  no hematoma. Pt medically cleared for discharge and can follow up with his cardiologist.       CAD (coronary artery disease).  -s/p THIAGO in RCA    cath site : looks ok , no hematoma   Echo from 2021 shows preserved EF (recovered from prior MI when EF was 40%), mild DD  TTE shows EF 73%, severe basal septal hypertrophy, normal LV systolic function, prob no WMA  - c/w ASA, plavix, atorvastatin  - Will DC diuretic and start amlodipine for suspected microvascular disease     Hypertension.   c/w losartan 100 mg daily     Hyperlipidemia.   lipid panel reviewed  - c/w Atorvastatin 40 mg HS.    Patient medically cleared per Dr. Howell and cardiology, Dr. Giang.

## 2023-03-13 NOTE — DISCHARGE NOTE PROVIDER - NSDCCPTREATMENT_GEN_ALL_CORE_FT
PRINCIPAL PROCEDURE  Procedure: Left heart cardiac cath  Findings and Treatment: Cardiac catheterization or coronary angiogram is done to understand how the heart is working and to look at the coronary arteries which supply oxygen to the heart muscles, to look at the heart chambers and the valves in the heart.  The doctor uses your groin or your arm to do the procedure  Your doctor will let you know when you can drive and do your usual physical activities  You will need to see your doctor within one week after discharge  Call your doctor if the insertion site bleeds a lot, if you get a fever or have pain, swelling, or redness where the tube went in, or if your leg feels weak, numb, or cold  Continue your medications. Do not stop Aspirin or Plavix unless instructed by your cardiologist. No heavy lifting, strenuous activity, bending, straining or unnecessary stair climbing for 2 weeks. No sex for 1 week. No driving for 2 days. You may shower 24 hours following procedure but avoid baths and swimming for 1 week. Check groin site for bleeding and/or swelling daily following procedure. Call your doctor/cardiologist immediately for bleeding or swelling or if you have increased/persistent pain, fever/chills, or drainage at the site. Follow up with your cardiologist in 1- 2 weeks. You may call [facility] at [phone] or [phone] after office hours and weekends with any questions or concerns following our procedure.

## 2023-03-13 NOTE — DISCHARGE NOTE PROVIDER - CARE PROVIDER_API CALL
Misael Martinez  INTERNAL MEDICINE  [address] [place] [address]  Phone: [phone]  Fax: [phone]  Follow Up Time:     Yunior Giang (DO)  Cardiovascular Disease; Internal Medicine; Nuclear Cardiology  [address] [facility], [address]  [place] [address]  Phone: [phone]  Fax: [phone]  Follow Up Time:

## 2023-03-13 NOTE — DISCHARGE NOTE NURSING/CASE MANAGEMENT/SOCIAL WORK - PATIENT PORTAL LINK FT
You can access the FollowMyHealth Patient Portal offered by [facility] by registering at the following website: [email or web address]. By joining [employer]’s FollowMyHealth portal, you will also be able to view your health information using other applications (apps) compatible with our system.

## 2023-03-13 NOTE — CHART NOTE - NSCHARTNOTEFT_GEN_A_CORE
Patient medically cleared per Dr. Howell and cardiology, Dr. Giang.   [facility] meds d/w Dr. Giang/kalpesh NP. D/C rec reviewed with Dr. Howell.   Hemodynamically stable for discharge today.

## 2023-03-13 NOTE — PROGRESS NOTE ADULT - SUBJECTIVE AND OBJECTIVE BOX
DATE OF SERVICE: 03-13-23 @ 12:13    Patient is a 47y old  Female who presents with a chief complaint of CAD (13 Mar 2023 08:38)      INTERVAL HISTORY: Feels ok.     REVIEW OF SYSTEMS:  CONSTITUTIONAL: No weakness  EYES/ENT: No visual changes;  No throat pain   NECK: No pain or stiffness  RESPIRATORY: No cough, wheezing; No shortness of breath  CARDIOVASCULAR: No chest pain or palpitations  GASTROINTESTINAL: No abdominal  pain. No nausea, vomiting, or hematemesis  GENITOURINARY: No dysuria, frequency or hematuria  NEUROLOGICAL: No stroke like symptoms  SKIN: No rashes    TELEMETRY Personally reviewed: SR   	  MEDICATIONS:  amLODIPine   Tablet 5 milliGRAM(s) Oral daily  losartan 100 milliGRAM(s) Oral at bedtime        PHYSICAL EXAM:  T(C): 36.4 (03-13-23 @ 08:08), Max: 36.7 (03-13-23 @ 00:12)  HR: 93 (03-13-23 @ 08:08) (64 - 93)  BP: 108/71 (03-13-23 @ 08:08) (107/79 - 159/91)  RR: 18 (03-13-23 @ 08:08) (16 - 18)  SpO2: 96% (03-13-23 @ 08:08) (93% - 99%)  Wt(kg): --  I&O's Summary    12 Mar 2023 07:01  -  13 Mar 2023 07:00  --------------------------------------------------------  IN: 120 mL / OUT: 0 mL / NET: 120 mL    13 Mar 2023 07:01  -  13 Mar 2023 12:13  --------------------------------------------------------  IN: 240 mL / OUT: 0 mL / NET: 240 mL          Appearance: In no distress	  HEENT:    PERRL, EOMI	  Cardiovascular:  S1 S2, No JVD  Respiratory: Lungs clear to auscultation	  Gastrointestinal:  Soft, Non-tender, + BS	  Vascularature:  No edema of LE  Psychiatric: Appropriate affect   Neuro: no acute focal deficits                               13.3   8.12  )-----------( 244      ( 12 Mar 2023 07:14 )             42.2     03-13    142  |  102  |  9   ----------------------------<  98  3.4<L>   |  28  |  0.51    Ca    9.3      13 Mar 2023 06:55          Labs personally reviewed      ASSESSMENT/PLAN: 	  47F w/ PMHx of CAD s/p PCI of LAD in 2020, HTN, HLD, anxiety, GERD presents to ED c/o mid back pain (variations of squeezing and sharp sensations) onset 2-3 weeks ago. Pt states that symptoms are similar to when she had the heart attack. Pt was experiencing nausea a couple of days ago w/ diaphoresis. Pt states Sx worsens when staying still.     Problem/Plan - 1:  ·  Problem: Chest pain.   ·  Plan: s/p Cath s/p PCI  Trop=<6  Ekg: NSR  CXR: with clear lungs  Echo from 2021 shows preserved EF (recovered from prior MI when EF was 40%), mild DD  TTE shows EF 73%, severe basal septal hypertrophy, normal LV systolic function, prob no WMA  Now s/p CAth with PCI to RCA  - c/w ASA, plavix, atorvastatin  - Will DC diuretic and start amlodipine for suspected microvascular disease      Problem/Plan - 2:  ·  Problem: CAD (coronary artery disease).   ·  Plan: - Continue with aspirin and Brilinta.  - s/p PCI to m/pLAD in 2020  - TTE shows EF 73%, severe basal septal hypertrophy, normal LV systolic function, prob no WMA  - s/p Cath with PCI to RCA  - c/w ASA, Plavix, Atorvastatin  - Will consider Repatha as OP.     Problem/Plan - 3:  ·  Problem: Hypertension.   ·  Plan: low salt, low fat, low cholesterol.  - Continue with losartan  - Will DC diuretic and start amlodipine for suspected microvascular disease    Problem/Plan - 4:  ·  Problem: Hyperlipidemia.   ·  Plan: lipitor.  - Will consider Repatha as OP.     Problem/Plan - 5:  ·  Problem: Anxiety.   ·  Plan: - Ativan PRN.    Problem/Plan - 6:  ·  Problem: Need for prophylactic measure.   ·  Plan: DASH diet    OP follow up with Padmaja Rodriguez on Wednesday, 3/15/23. Lifestyle modifications such as smoking cessation (patient vapes), dietary counseling with heart healthy options and physical acivities discussed at length.     Arely Hill, TY-NP   Yunior Giang,  [facility]  Cardiovascular Medicine  [address] [place] [address], [address]  Available through call or text on [employer] TEAM[employer]  Office: [phone]

## 2023-03-13 NOTE — DISCHARGE NOTE PROVIDER - NSDCCPCAREPLAN_GEN_ALL_CORE_FT
PRINCIPAL DISCHARGE DIAGNOSIS  Diagnosis: CAD (coronary artery disease)  Assessment and Plan of Treatment: You required to have an angiogram. You were given a stent to open up your coronoary artery -> s/p THIAGO in RCA    cath site : looks ok , no hematoma   Echo from 2021 shows preserved EF (recovered from prior MI when EF was 40%), mild DD  TTE shows EF 73%, severe basal septal hypertrophy, normal LV systolic function, prob no WMA  - c/w ASA, plavix, atorvastatin  - Will DC diuretic and start amlodipine for suspected microvascular disease  Coronary artery disease is a condition where the arteries the supply the heart muscle get clogges with fatty deposits & puts you at risk for a heart attack  Call your doctor if you have any new pain, pressure, or discomfort in the center of your chest, pain, tingling or discomfort in arms, back, neck, jaw, or stomach, shortness of breath, nausea, vomiting, burping or heartburn, sweating, cold and clammy skin, racing or abnormal heartbeat for more than 10 minutes or if they keep coming & going.  Call 911 and do not tr to get to hospital by care  You can help yourself with lefestyle changes (quitting smoking if you smoke), eat lots of fruits & vegetables & low fat dairy products, not a lot of meat & fatty foods, walk or some form of physical activity most days of the week, lose weight if you are overweight  Take your cardiac medication as prescribed to lower cholesterol, to lower blood pressure, aspirin to prevent blood clots, and diabetes control  Make sure to keep appointments with doctor for cardiac follow up care        SECONDARY DISCHARGE DIAGNOSES  Diagnosis: Hypertension  Assessment and Plan of Treatment: Low salt diet  Activity as tolerated.  Take all medication as prescribed.  Follow up with your medical doctor for routine blood pressure monitoring at your next visit.  Notify your doctor if you have any of the following symptoms:   Dizziness, Lightheadedness, Blurry vision, Headache, Chest pain, Shortness of breath      Diagnosis: Hyperlipidemia  Assessment and Plan of Treatment: Continue with your cholesterol medications. Eat a heart healthy diet that is low in saturated fats and salt, and includes whole grains, fruits, vegetables and lean protein; exercise regularly (consult with your physician or cardiologist first); maintain a heart healthy weight; if you smoke - quit (A resource to help you stop smoking is the [facility] – phone number [phone].). Continue to follow with your primary physician or cardiologist.  Seek medical help for dizziness, Lightheadedness, Blurry vision, Headache, Chest pain, Shortness of breath

## 2023-03-13 NOTE — CHART NOTE - NSCHARTNOTEFT_GEN_A_CORE
Interventional Cardiology Post Cath Progress Note:                Subjective:   Patient feels well- no current complaints- Denies chest pain, shortness of breath. Denies pain, numbness, tingling or swelling around (groin/wrist) access site      MEDICATIONS  (STANDING):  amLODIPine   Tablet 5 milliGRAM(s) Oral daily  aspirin enteric coated 81 milliGRAM(s) Oral daily  atorvastatin 40 milliGRAM(s) Oral at bedtime  clopidogrel Tablet 75 milliGRAM(s) Oral daily  losartan 100 milliGRAM(s) Oral at bedtime  potassium chloride    Tablet ER 40 milliEquivalent(s) Oral daily  senna 2 Tablet(s) Oral at bedtime  sodium chloride 0.9%. 1000 milliLiter(s) (75 mL/Hr) IV Continuous <Continuous>  sodium chloride 0.9%. 1000 milliLiter(s) (100 mL/Hr) IV Continuous <Continuous>    MEDICATIONS  (PRN):  LORazepam     Tablet 0.5 milliGRAM(s) Oral two times a day PRN Anxiety  melatonin 5 milliGRAM(s) Oral at bedtime PRN Sleep      Objective:  Vital Signs Last 24 Hrs  T(C): 36.4 (13 Mar 2023 08:08), Max: 36.7 (13 Mar 2023 00:12)  T(F): 97.5 (13 Mar 2023 08:08), Max: 98.1 (13 Mar 2023 00:12)  HR: 93 (13 Mar 2023 08:08) (64 - 93)  BP: 108/71 (13 Mar 2023 08:08) (107/79 - 159/91)  BP(mean): --  RR: 18 (13 Mar 2023 08:08) (16 - 18)  SpO2: 96% (13 Mar 2023 08:08) (93% - 99%)    Parameters below as of 13 Mar 2023 08:08  Patient On (Oxygen Delivery Method): room air        03-12-23 @ 07:01  -  03-13-23 @ 07:00  --------------------------------------------------------  IN: 120 mL / OUT: 0 mL / NET: 120 mL                              13.3   8.12  )-----------( 244      ( 12 Mar 2023 07:14 )             42.2     03-13    142  |  102  |  9   ----------------------------<  98  3.4<L>   |  28  |  0.51    Ca    9.3      13 Mar 2023 06:55      PT/INR - ( 12 Mar 2023 07:14 )   PT: 12.4 sec;   INR: 1.08 ratio             Physical Exam:  No apparent distress, alert and oriented times three, appropriate affect  JVD is not elevated, supple  Clear to auscultation with no wheezing, rhonchi or crackles  Regular rate and rhythm with no murmur, rub or gallop  Right groin: dressing removed site is Soft, non tender, no bleeding or hematoma, dressing removed - RLE/LLE +2 palpable  No clubbing, cyanosis or edema      Assessment/Plan:   HPI:  47 F PMHx of CAD s/p PCI of LAD in 2020, HTN, HLD, anxiety, GERD who presents with back pain for a few days. Patient states she had similar complains in 2020 when she has an MI. She denies any nausea and diaphoresis. States she had Chest pressor/ pain ~4 days ago at work. States she was walking when she experienced pain, unclear who long the pain lasted, relieved with rest. She has been compliant with all her meds.     3/12/2023 s/p PCI to pRCA 80% x 1 THIAGO via RFA  - groin site is stable- dressing removed. Patient in bed however states she ambulated yesterday and earlier this AM and tolerated ambulation  - Continue DAPT (aspirin 81mg and clopidogrel 75mg for 6 months)  - Continue statin  - Recommend a heart healthy diet which includes a variety of fruits and vegetables, whole grains, low fat dairy products, legumes and skinless poultry and fish; food prepared with little or no salt and minimize processed foods  - Avoid using NSAIDs  (Aleve, Motrin, ibuprofen, naproxen) while on DAPT, please utilize Tylenol for pain control (not to exceed 4gm in 24 hours)  - Patient aware to take DAPT  as prescribed and DO NOT STOP taking without consulting cardiologist first or STENT/s WILL CLOSE  - Reviewed and reinforced with patient:  site complications ( eg: bleeding, excruciating pain at the procedural site, large swelling ball size-  extremity numbness, tingling, temperature change), or CHEST PAIN; pt aware that if any of those occur he/she must call cardiologist IMMEDIATELY or 911 or go to nearest emergency room   - Patient verbalizes understanding of ALL OF THE ABOVE, and gives positive feedback   -please make sure DAPT is prescribed to pt's preferred pharmacy on dc   -f/u appt in 2 weeks post dc with outpt cardiologist, Dr Giang  - Keep Mg >2 K>4   - cont to monitor on tele  - all other care as per primary     Please check [email or web address] password [email or web address] for cardiology service schedule and contact information via TEAMS.

## 2023-03-13 NOTE — DISCHARGE NOTE PROVIDER - NSDCMRMEDTOKEN_GEN_ALL_CORE_FT
aspirin 81 mg oral tablet, chewable: 1 tab(s) orally once a day  atorvastatin 40 mg oral tablet: 1 tab(s) orally once a day (at bedtime)  chlorthalidone 25 mg oral tablet: 1 tab(s) orally once a day  K-Tab 20 mEq oral tablet, extended release: 2 tab(s) orally once a day  LORazepam 0.5 mg oral tablet: 1 tab(s) orally 2 times a day, As Needed  losartan 100 mg oral tablet: 1 tab(s) orally once a day  Ozempic:   Plavix 75 mg oral tablet: 1 tab(s) orally once a day MDD:take one tablet daily   amLODIPine 5 mg oral tablet: 1 tab(s) orally once a day  aspirin 81 mg oral tablet, chewable: 1 tab(s) orally once a day  atorvastatin 40 mg oral tablet: 1 tab(s) orally once a day (at bedtime)  LORazepam 0.5 mg oral tablet: 1 tab(s) orally 2 times a day, As Needed  losartan 100 mg oral tablet: 1 tab(s) orally once a day  Ozempic:   Plavix 75 mg oral tablet: 1 tab(s) orally once a day MDD:take one tablet daily

## 2023-03-13 NOTE — DISCHARGE NOTE NURSING/CASE MANAGEMENT/SOCIAL WORK - NSDCFUADDAPPT_GEN_ALL_CORE_FT
APPTS ARE READY TO BE MADE: [ X] YES    Best Family or Patient Contact (if needed):    Additional Information about above appointments (if needed):    1: PCP within 1-2 weeks of discharge  2: Cardiology with Dr. Giang on 3/15/23 2pm  3:     Other comments or requests:

## 2023-03-13 NOTE — PHARMACOTHERAPY INTERVENTION NOTE - COMMENTS
Counseled patient on the following new discharge medications names (brand/generic), indication, and possible side effects:  aspirin 81mg daily for s/p THIAGO  plavix 75mg daily for s/p THIAGO  amlodipine 5mg daily    Patient was provided with a medication card for their new medication. Patient questions and concerns were answered and addressed. Patient demonstrated understanding.    Evelyn PanchalD, [facility]  Clinical Pharmacy Specialist  [phone] or Teams

## 2023-03-13 NOTE — DISCHARGE NOTE PROVIDER - NSDCFUADDAPPT_GEN_ALL_CORE_FT
APPTS ARE READY TO BE MADE: [ X] YES    Best Family or Patient Contact (if needed):    Additional Information about above appointments (if needed):    1: PCP within 1-2 weeks of discharge  2: Cardiology with Dr. Giang on 3/15/23 (previously made)   3:     Other comments or requests:    APPTS ARE READY TO BE MADE: [ X] YES    Best Family or Patient Contact (if needed):    Additional Information about above appointments (if needed):    1: PCP within 1-2 weeks of discharge  2: Cardiology with Dr. Giang on 3/15/23 2pm  3:     Other comments or requests:    APPTS ARE READY TO BE MADE: [ X] YES    Best Family or Patient Contact (if needed):    Additional Information about above appointments (if needed):    1: PCP within 1-2 weeks of discharge  2: Cardiology with Dr. Giang on 3/15/23 2pm  3:     Patient is scheduled with Dr. Martinez on 3/16 at 11am, [place] location.                Other comments or requests:

## 2023-03-13 NOTE — DISCHARGE NOTE PROVIDER - CARE PROVIDERS DIRECT ADDRESSES
,[email or web address]

## 2023-03-14 ENCOUNTER — NON-APPOINTMENT (OUTPATIENT)
Age: 48
End: 2023-03-14

## 2023-03-16 ENCOUNTER — APPOINTMENT (OUTPATIENT)
Dept: INTERNAL MEDICINE | Facility: CLINIC | Age: 48
End: 2023-03-16
Payer: COMMERCIAL

## 2023-03-16 VITALS
SYSTOLIC BLOOD PRESSURE: 118 MMHG | HEIGHT: 67 IN | DIASTOLIC BLOOD PRESSURE: 80 MMHG | RESPIRATION RATE: 16 BRPM | BODY MASS INDEX: 34.21 KG/M2 | OXYGEN SATURATION: 98 % | HEART RATE: 74 BPM | WEIGHT: 218 LBS

## 2023-03-16 PROCEDURE — 99495 TRANSJ CARE MGMT MOD F2F 14D: CPT

## 2023-03-16 RX ORDER — CHLORTHALIDONE 25 MG/1
25 TABLET ORAL
Qty: 90 | Refills: 0 | Status: DISCONTINUED | COMMUNITY
Start: 2022-07-22 | End: 2023-03-16

## 2023-03-16 RX ORDER — FUROSEMIDE 20 MG/1
20 TABLET ORAL
Qty: 90 | Refills: 1 | Status: ACTIVE | COMMUNITY
Start: 2023-03-16 | End: 1900-01-01

## 2023-03-16 RX ORDER — AMLODIPINE BESYLATE 5 MG/1
5 TABLET ORAL DAILY
Qty: 90 | Refills: 0 | Status: ACTIVE | COMMUNITY
Start: 2023-03-16

## 2023-03-16 NOTE — HISTORY OF PRESENT ILLNESS
[Post-hospitalization from ___ Hospital] : Post-hospitalization from [unfilled] Hospital [Admitted on: ___] : The patient was admitted on [unfilled] [Discharged on ___] : discharged on [unfilled] [Discharge Summary] : discharge summary [Pertinent Labs] : pertinent labs [Radiology Findings] : radiology findings [Discharge Med List] : discharge medication list [Med Reconciliation] : medication reconciliation has been completed [Patient Contacted By: ____] : and contacted by [unfilled] [FreeTextEntry2] : 47 year old woman s/p admission to [facility] with AWMI ans SP PCI x 2 to LAD with resultant moderate to severe LV dysfunction - lv dysfunction recovered on echo, but has subsequent THIAGO to PRox RCA 80%.. Also with hx HTn, HLD, ovarian cyst, anx with depression, insomnia. \par s/p Robotic RSO, extensive lysis of adhesions, resection of peritoneal nodule\par s/p admission 11/22/21 - 11/23/21 with chest pains. Reports that the pain started in the back. she was seen by cardiology - cxr neg, echo with improved EF, diastolic dysfunction, Stress okay. CBC nl, Cr 0.58, A1c 5.6, lft nl, chol 139, trig 93, HDL 47, LDL 73. no palpitations, dyspnea. with occ dizziness attributed to the diuretic. diuretic changed to HCTZ and potassium added. Reports sx improved, but has returned with occ rt and lt medial chest pains. + some pains under the breasts. No current CP. no cough or wheezing. no fevers. Pain triggered/exacerbated by sitting and thinking about the pain. Better with moving around and doing things. + some fatigue. \par \par Cardiology - Dr Giang - [phone] meds have been changed since the hospital - on losartan/hctz, potassium, lasix stopped, brilinta was stopped. \par completed cardiac rehab.\par Has been following with Cardiology.  s/p admission 3/11-13/23  - on 3/12/23 for cardiac w/u s/p LHC with THIAGO to PRox RCA 80%.Pt to continue with asa and plavix.  to continue with statin.  diuretic changed to amlodipine for suspected microvascular dz.  Reprots wt gain since getting out of the hospital.  Asking to restart the diuretic.  \par \par She is an active smoker - was cutting down, trying to quit. Has changed to vaping - has been cutting down drastically.  \par cut down on the drinking - has been better - reports now rare\par Has been walking daily. Has been following diet.\par Taking meds w/o issues. \par No cv sx. \par no GI sx. no pain, nausea, vomiting diarrhea, constipation. \par no noted neuro sx. \par Taking melatonin as needed.\par SAw GI - had cologaurd 8/21\par Reports that she has been having anxiety - Denies depression. Has been improved since she stopped working. did not like the way the ssri made her feel. notr taking. Has been taking the benzo as needed - Has been lasting > 1 mo. now going through divorce.  Asking for refill b/c she has been under stress, still plans on using only as needed and cutting down when the stressors improve (divorce and selling her house).\par Has been having some pains at the left shoulder. \par \par vaccines - reported as up to date\par for flu vaccine\par \par Mammo - due - to make appt\par gyn- up to date.\par ophtho - due.

## 2023-03-16 NOTE — HEALTH RISK ASSESSMENT
[Current] : Current [Yes] : Yes [Monthly or less (1 pt)] : Monthly or less (1 point) [1 or 2 (0 pts)] : 1 or 2 (0 points) [Never (0 pts)] : Never (0 points) [No] : In the past 12 months have you used drugs other than those required for medical reasons? No [0] : 2) Feeling down, depressed, or hopeless: Not at all (0) [PHQ-2 Negative - No further assessment needed] : PHQ-2 Negative - No further assessment needed [de-identified] : Vaping [Audit-CScore] : 1 [[id number]] : 0

## 2023-03-16 NOTE — PHYSICAL EXAM
[No Acute Distress] : no acute distress [Well Nourished] : well nourished [Well Developed] : well developed [Well-Appearing] : well-appearing [Normal Voice/Communication] : normal voice/communication [Normal Sclera/Conjunctiva] : normal sclera/conjunctiva [PERRL] : pupils equal round and reactive to light [EOMI] : extraocular movements intact [Normal Outer Ear/Nose] : the outer ears and nose were normal in appearance [No JVD] : no jugular venous distention [No Lymphadenopathy] : no lymphadenopathy [Supple] : supple [Thyroid Normal, No Nodules] : the thyroid was normal and there were no nodules present [No Respiratory Distress] : no respiratory distress  [No Accessory Muscle Use] : no accessory muscle use [Clear to Auscultation] : lungs were clear to auscultation bilaterally [Normal Rate] : normal rate  [Regular Rhythm] : with a regular rhythm [Normal S1, S2] : normal S1 and S2 [No Murmur] : no murmur heard [No Carotid Bruits] : no carotid bruits [No Abdominal Bruit] : a ~M bruit was not heard ~T in the abdomen [Pedal Pulses Present] : the pedal pulses are present [No Edema] : there was no peripheral edema [No Palpable Aorta] : no palpable aorta [Soft] : abdomen soft [Non Tender] : non-tender [Non-distended] : non-distended [No Masses] : no abdominal mass palpated [No HSM] : no HSM [Normal Bowel Sounds] : normal bowel sounds [No CVA Tenderness] : no CVA  tenderness [No Spinal Tenderness] : no spinal tenderness [No Joint Swelling] : no joint swelling [Grossly Normal Strength/Tone] : grossly normal strength/tone [Coordination Grossly Intact] : coordination grossly intact [No Focal Deficits] : no focal deficits [Normal Gait] : normal gait [Speech Grossly Normal] : speech grossly normal [Memory Grossly Normal] : memory grossly normal [Normal Affect] : the affect was normal [Alert and Oriented x3] : oriented to person, place, and time [Normal Mood] : the mood was normal [Normal Insight/Judgement] : insight and judgment were intact [38398 - Moderate Complexity requires multiple possible diagnoses and/or the management options, moderate complexity of the medical data (tests, etc.) to be reviewed, and moderate risk of significant complications, morbidity, and/or mortality as well as co] : Moderate Complexity [de-identified] : obese

## 2023-03-29 NOTE — PATIENT PROFILE ADULT - HOME ACCESSIBILITY CONCERNS
Patient is off of the floor for procedure and unavailable for PT interventions. Will follow up tomorrow.      Thank you,  Candace Meza, PT, DPT none

## 2023-04-20 ENCOUNTER — APPOINTMENT (OUTPATIENT)
Dept: INTERNAL MEDICINE | Facility: CLINIC | Age: 48
End: 2023-04-20
Payer: COMMERCIAL

## 2023-04-20 ENCOUNTER — NON-APPOINTMENT (OUTPATIENT)
Age: 48
End: 2023-04-20

## 2023-04-20 VITALS
HEIGHT: 67 IN | WEIGHT: 213 LBS | SYSTOLIC BLOOD PRESSURE: 118 MMHG | HEART RATE: 72 BPM | OXYGEN SATURATION: 96 % | DIASTOLIC BLOOD PRESSURE: 80 MMHG | BODY MASS INDEX: 33.43 KG/M2 | RESPIRATION RATE: 16 BRPM

## 2023-04-20 PROCEDURE — 93000 ELECTROCARDIOGRAM COMPLETE: CPT

## 2023-04-20 PROCEDURE — 99214 OFFICE O/P EST MOD 30 MIN: CPT | Mod: 25

## 2023-04-20 NOTE — HISTORY OF PRESENT ILLNESS
[FreeTextEntry1] : HTN, HLD, CAD, ovarian cyst, anx/dep, insomnia [de-identified] : 47 year old woman s/p admission to [facility] with AWMI ans SP PCI x 2 to LAD with resultant moderate to severe LV dysfunction - lv dysfunction recovered on echo, but has subsequent THIAGO to PRox RCA 80%.. Also with hx HTn, HLD, ovarian cyst, anx with depression, insomnia. \par s/p Robotic RSO, extensive lysis of adhesions, resection of peritoneal nodule\par s/p admission 11/22/21 - 11/23/21 with chest pains. Reports that the pain started in the back. she was seen by cardiology - cxr neg, echo with improved EF, diastolic dysfunction, Stress okay. CBC nl, Cr 0.58, A1c 5.6, lft nl, chol 139, trig 93, HDL 47, LDL 73. no palpitations, dyspnea. with occ dizziness attributed to the diuretic. diuretic changed to HCTZ and potassium added. Reports sx improved, but has returned with occ rt and lt medial chest pains. + some pains under the breasts. No current CP. no cough or wheezing. no fevers. Pain triggered/exacerbated by sitting and thinking about the pain. Better with moving around and doing things. + some fatigue. \par \par Cardiology - Dr Giang - [phone] meds have been changed since the hospital - on losartan/hctz, potassium, lasix stopped, brilinta was stopped. \par completed cardiac rehab.\par Has been following with Cardiology. s/p admission 3/11-13/23 - on 3/12/23 for cardiac w/u s/p LHC with THIAGO to PRox RCA 80%.Pt to continue with asa and plavix. to continue with statin. diuretic changed to amlodipine for suspected microvascular dz. Reprots wt gain since getting out of the hospital. Asking to restart the diuretic. \par \par Has been having back pains for ~ 2 weeks.  at the upper back - located between the shoulder blades, dull ache, intermittent - occasionally radiates to the lateral back.  No noted triggers.  some relief with tylenol.  no other noted relieving or exacerbating factors.  \par Has been having some chest pains for the past couple of days.  Located at the right and left side - local points.  Vague pains.  no noted palpitations, nausea or vomiting.  some lightheadedness since the meds were changed after the cath.  no fevers, chills, sweats, coughing, wheezing.  \par \par Hx smoking - was cutting down, trying to quit. Has changed to vaping - has been cutting down drastically - quit.\par cut down on the drinking - has been better - reports now rare\par Has been walking daily. Has been following diet.\par Taking meds w/o issues. \par No cv sx. \par no GI sx. no pain, nausea, vomiting diarrhea, constipation. \par no noted neuro sx. \par Taking melatonin as needed.\par SAw GI - had cologaurd 8/21\par Reports that she has been having anxiety - Denies depression. Has been improved since she stopped working. did not like the way the ssri made her feel. not taking. Has been taking the benzo as needed - Has been lasting > 1 mo. now going through divorce. continues to be under stress, still plans on using only as needed and cutting down when the stressors improve (divorce and selling her house).\par shoulder has been better.\par \par vaccines - reported as up to date\par for flu vaccine\par \par Mammo - due - to make appt\par gyn- up to date.\par ophtho - due.

## 2023-04-20 NOTE — HEALTH RISK ASSESSMENT
[Yes] : Yes [Monthly or less (1 pt)] : Monthly or less (1 point) [1 or 2 (0 pts)] : 1 or 2 (0 points) [Never (0 pts)] : Never (0 points) [No] : In the past 12 months have you used drugs other than those required for medical reasons? No [0] : 1) Little interest or pleasure doing things: Not at all (0) [Former] : Former [Audit-CScore] : 1 [[id number]] : 0

## 2023-04-20 NOTE — PHYSICAL EXAM
[No Acute Distress] : no acute distress [Well Nourished] : well nourished [Well Developed] : well developed [Well-Appearing] : well-appearing [Normal Voice/Communication] : normal voice/communication [Normal Sclera/Conjunctiva] : normal sclera/conjunctiva [PERRL] : pupils equal round and reactive to light [EOMI] : extraocular movements intact [Normal Outer Ear/Nose] : the outer ears and nose were normal in appearance [No JVD] : no jugular venous distention [No Lymphadenopathy] : no lymphadenopathy [Supple] : supple [Thyroid Normal, No Nodules] : the thyroid was normal and there were no nodules present [No Respiratory Distress] : no respiratory distress  [No Accessory Muscle Use] : no accessory muscle use [Clear to Auscultation] : lungs were clear to auscultation bilaterally [Normal Rate] : normal rate  [Regular Rhythm] : with a regular rhythm [Normal S1, S2] : normal S1 and S2 [No Murmur] : no murmur heard [No Carotid Bruits] : no carotid bruits [No Abdominal Bruit] : a ~M bruit was not heard ~T in the abdomen [Pedal Pulses Present] : the pedal pulses are present [No Edema] : there was no peripheral edema [No Palpable Aorta] : no palpable aorta [Soft] : abdomen soft [Non Tender] : non-tender [Non-distended] : non-distended [No Masses] : no abdominal mass palpated [No HSM] : no HSM [Normal Bowel Sounds] : normal bowel sounds [No CVA Tenderness] : no CVA  tenderness [No Spinal Tenderness] : no spinal tenderness [No Joint Swelling] : no joint swelling [Grossly Normal Strength/Tone] : grossly normal strength/tone [Coordination Grossly Intact] : coordination grossly intact [No Focal Deficits] : no focal deficits [Speech Grossly Normal] : speech grossly normal [Normal Gait] : normal gait [Memory Grossly Normal] : memory grossly normal [Normal Affect] : the affect was normal [Alert and Oriented x3] : oriented to person, place, and time [Normal Mood] : the mood was normal [Normal Insight/Judgement] : insight and judgment were intact [de-identified] : obese [de-identified] : no noted tenderness or spasm.   [de-identified] : rt upper costochondral tenderness

## 2023-04-20 NOTE — REVIEW OF SYSTEMS
[Insomnia] : insomnia [Anxiety] : anxiety [Depression] : depression [Negative] : Heme/Lymph [Chest Pain] : chest pain [Back Pain] : back pain [Suicidal] : not suicidal

## 2023-04-24 ENCOUNTER — APPOINTMENT (OUTPATIENT)
Dept: RADIOLOGY | Facility: CLINIC | Age: 48
End: 2023-04-24
Payer: COMMERCIAL

## 2023-04-24 ENCOUNTER — OUTPATIENT (OUTPATIENT)
Dept: OUTPATIENT SERVICES | Facility: HOSPITAL | Age: 48
LOS: 1 days | End: 2023-04-24
Payer: COMMERCIAL

## 2023-04-24 DIAGNOSIS — Z98.890 OTHER SPECIFIED POSTPROCEDURAL STATES: Chronic | ICD-10-CM

## 2023-04-24 DIAGNOSIS — D27.1 BENIGN NEOPLASM OF LEFT OVARY: Chronic | ICD-10-CM

## 2023-04-24 DIAGNOSIS — M54.9 DORSALGIA, UNSPECIFIED: ICD-10-CM

## 2023-04-24 PROCEDURE — 72070 X-RAY EXAM THORAC SPINE 2VWS: CPT | Mod: 26

## 2023-04-24 PROCEDURE — 72070 X-RAY EXAM THORAC SPINE 2VWS: CPT

## 2023-05-01 ENCOUNTER — APPOINTMENT (OUTPATIENT)
Dept: OBGYN | Facility: CLINIC | Age: 48
End: 2023-05-01

## 2023-05-18 ENCOUNTER — RESULT REVIEW (OUTPATIENT)
Age: 48
End: 2023-05-18

## 2023-05-18 ENCOUNTER — APPOINTMENT (OUTPATIENT)
Dept: MAMMOGRAPHY | Facility: CLINIC | Age: 48
End: 2023-05-18
Payer: COMMERCIAL

## 2023-05-18 ENCOUNTER — APPOINTMENT (OUTPATIENT)
Dept: ULTRASOUND IMAGING | Facility: CLINIC | Age: 48
End: 2023-05-18

## 2023-05-18 PROCEDURE — 77063 BREAST TOMOSYNTHESIS BI: CPT

## 2023-05-18 PROCEDURE — 77067 SCR MAMMO BI INCL CAD: CPT

## 2023-05-25 ENCOUNTER — APPOINTMENT (OUTPATIENT)
Dept: INTERNAL MEDICINE | Facility: CLINIC | Age: 48
End: 2023-05-25
Payer: COMMERCIAL

## 2023-05-25 VITALS
BODY MASS INDEX: 34.37 KG/M2 | DIASTOLIC BLOOD PRESSURE: 80 MMHG | WEIGHT: 219 LBS | HEART RATE: 73 BPM | RESPIRATION RATE: 16 BRPM | SYSTOLIC BLOOD PRESSURE: 122 MMHG | HEIGHT: 67 IN | OXYGEN SATURATION: 98 %

## 2023-05-25 VITALS — WEIGHT: 212 LBS | BODY MASS INDEX: 33.2 KG/M2

## 2023-05-25 DIAGNOSIS — Z87.891 PERSONAL HISTORY OF NICOTINE DEPENDENCE: ICD-10-CM

## 2023-05-25 PROCEDURE — 99214 OFFICE O/P EST MOD 30 MIN: CPT | Mod: 25

## 2023-05-25 RX ORDER — NICOTINE TRANSDERMAL SYSTEM 7 MG/24H
7 PATCH, EXTENDED RELEASE TRANSDERMAL
Qty: 21 | Refills: 1 | Status: DISCONTINUED | COMMUNITY
Start: 2021-01-19 | End: 2023-05-25

## 2023-05-25 RX ORDER — ALIROCUMAB 150 MG/ML
150 INJECTION, SOLUTION SUBCUTANEOUS
Refills: 0 | Status: ACTIVE | COMMUNITY
Start: 2023-05-25

## 2023-05-25 RX ORDER — TICAGRELOR 60 MG/1
60 TABLET ORAL
Refills: 0 | Status: ACTIVE | COMMUNITY
Start: 2023-05-25

## 2023-05-25 RX ORDER — CLOPIDOGREL BISULFATE 75 MG/1
75 TABLET, FILM COATED ORAL
Qty: 30 | Refills: 0 | Status: DISCONTINUED | COMMUNITY
Start: 2023-03-16 | End: 2023-05-25

## 2023-05-25 NOTE — PHYSICAL EXAM
[No Acute Distress] : no acute distress [Well Nourished] : well nourished [Well Developed] : well developed [Well-Appearing] : well-appearing [Normal Voice/Communication] : normal voice/communication [Normal Sclera/Conjunctiva] : normal sclera/conjunctiva [PERRL] : pupils equal round and reactive to light [EOMI] : extraocular movements intact [Normal Outer Ear/Nose] : the outer ears and nose were normal in appearance [No JVD] : no jugular venous distention [No Lymphadenopathy] : no lymphadenopathy [Supple] : supple [Thyroid Normal, No Nodules] : the thyroid was normal and there were no nodules present [No Respiratory Distress] : no respiratory distress  [No Accessory Muscle Use] : no accessory muscle use [Clear to Auscultation] : lungs were clear to auscultation bilaterally [Normal Rate] : normal rate  [Regular Rhythm] : with a regular rhythm [Normal S1, S2] : normal S1 and S2 [No Murmur] : no murmur heard conducted a detailed discussion... I had a detailed discussion with the patient and/or guardian regarding the historical points, exam findings, and any diagnostic results supporting the discharge/admit diagnosis. [No Carotid Bruits] : no carotid bruits [No Abdominal Bruit] : a ~M bruit was not heard ~T in the abdomen [Pedal Pulses Present] : the pedal pulses are present [No Edema] : there was no peripheral edema [No Palpable Aorta] : no palpable aorta [Soft] : abdomen soft [Non Tender] : non-tender [Non-distended] : non-distended [No Masses] : no abdominal mass palpated [No HSM] : no HSM [Normal Bowel Sounds] : normal bowel sounds [No CVA Tenderness] : no CVA  tenderness [No Spinal Tenderness] : no spinal tenderness [No Joint Swelling] : no joint swelling [Grossly Normal Strength/Tone] : grossly normal strength/tone [Coordination Grossly Intact] : coordination grossly intact [No Focal Deficits] : no focal deficits [Normal Gait] : normal gait [Speech Grossly Normal] : speech grossly normal [Memory Grossly Normal] : memory grossly normal [Normal Affect] : the affect was normal [Alert and Oriented x3] : oriented to person, place, and time [Normal Mood] : the mood was normal [Normal Insight/Judgement] : insight and judgment were intact [Normal Oropharynx] : the oropharynx was normal [de-identified] : obese [de-identified] : no noted tenderness or spasm.

## 2023-05-25 NOTE — HEALTH RISK ASSESSMENT
[Yes] : Yes [Monthly or less (1 pt)] : Monthly or less (1 point) [1 or 2 (0 pts)] : 1 or 2 (0 points) [Never (0 pts)] : Never (0 points) [No] : In the past 12 months have you used drugs other than those required for medical reasons? No [0] : 2) Feeling down, depressed, or hopeless: Not at all (0) [PHQ-2 Negative - No further assessment needed] : PHQ-2 Negative - No further assessment needed [Former] : Former [Audit-CScore] : 1 [CBP3Msqdl] : 0

## 2023-05-25 NOTE — HISTORY OF PRESENT ILLNESS
[FreeTextEntry1] : HTN, HLD, CAD, ovarian cyst, anx/dep, insomnia [de-identified] : 47 year old woman s/p admission to [facility] with AWMI ans SP PCI x 2 to LAD with resultant moderate to severe LV dysfunction - lv dysfunction recovered on echo, but has subsequent THIAGO to PRox RCA 80%.. Also with hx HTn, HLD, ovarian cyst, anx with depression, insomnia. \par s/p Robotic RSO, extensive lysis of adhesions, resection of peritoneal nodule\par s/p admission 11/22/21 - 11/23/21 with chest pains. Reports that the pain started in the back. she was seen by cardiology - cxr neg, echo with improved EF, diastolic dysfunction, Stress okay. CBC nl, Cr 0.58, A1c 5.6, lft nl, chol 139, trig 93, HDL 47, LDL 73. no palpitations, dyspnea. with occ dizziness attributed to the diuretic. diuretic changed to HCTZ and potassium added. Reports sx improved, but has returned with occ rt and lt medial chest pains. + some pains under the breasts. No current CP. no cough or wheezing. no fevers. Pain triggered/exacerbated by sitting and thinking about the pain. Better with moving around and doing things. + some fatigue. \par \par Cardiology - Dr Giang - [phone] meds have been changed since the hospital - on losartan/hctz, potassium, lasix stopped, brilinta was stopped. \par completed cardiac rehab.\par Has been following with Cardiology. s/p admission 3/11-13/23 - on 3/12/23 for cardiac w/u s/p LHC with THIAGO to PRox RCA 80%.Pt to continue with asa and plavix. to continue with statin. diuretic changed to amlodipine for suspected microvascular dz. Reprots wt gain since getting out of the hospital. restarted the diuretic.\par Has just been started on Praulent.  continues on the statin.  Still from some body aches from the statin.\par \par Back has been improved.  Has been only mild.  \par \par Hx smoking - was cutting down, trying to quit. Had changed to vaping - quit.\par cut down on the drinking - has been better - reports now rare\par Has been walking daily. Has been following diet.\par Taking meds w/o issues. \par No cv sx. \par no GI sx. no pain, nausea, vomiting diarrhea, constipation. \par no noted neuro sx. \par Taking melatonin as needed.\par SAw GI - had cologaurd 8/21\par Reports that she has been having anxiety - Denies depression. Has been improved since she stopped working. did not like the way the ssri made her feel. not taking. Has been taking the benzo as needed - Has been lasting > 1 mo. now going through divorce. continues to be under stress, still plans on using only as needed and cutting down when the stressors improve (divorce and selling her house).  Taking the benzo only as needed.  \par shoulder has been better.\par \par vaccines - reported as up to date\par for flu vaccine\par \par Mammo - due - to make appt\par gyn- up to date.\par ophtho - due.

## 2023-05-25 NOTE — REVIEW OF SYSTEMS
[Chest Pain] : chest pain [Back Pain] : back pain [Insomnia] : insomnia [Anxiety] : anxiety [Depression] : depression [Negative] : Heme/Lymph [Suicidal] : not suicidal

## 2023-06-12 LAB
ALBUMIN SERPL ELPH-MCNC: 4.4 G/DL
ALP BLD-CCNC: 80 U/L
ALT SERPL-CCNC: 22 U/L
ANION GAP SERPL CALC-SCNC: 14 MMOL/L
AST SERPL-CCNC: 12 U/L
BILIRUB SERPL-MCNC: 0.6 MG/DL
BUN SERPL-MCNC: 11 MG/DL
CALCIUM SERPL-MCNC: 9.5 MG/DL
CHLORIDE SERPL-SCNC: 106 MMOL/L
CHOLEST SERPL-MCNC: 81 MG/DL
CO2 SERPL-SCNC: 24 MMOL/L
CREAT SERPL-MCNC: 0.55 MG/DL
EGFR: 112 ML/MIN/1.73M2
ESTIMATED AVERAGE GLUCOSE: 105 MG/DL
GLUCOSE SERPL-MCNC: 87 MG/DL
HBA1C MFR BLD HPLC: 5.3 %
HDLC SERPL-MCNC: 52 MG/DL
LDLC SERPL CALC-MCNC: 19 MG/DL
MAGNESIUM SERPL-MCNC: 2.1 MG/DL
NONHDLC SERPL-MCNC: 29 MG/DL
POTASSIUM SERPL-SCNC: 4.4 MMOL/L
PROT SERPL-MCNC: 6.8 G/DL
SODIUM SERPL-SCNC: 144 MMOL/L
TRIGL SERPL-MCNC: 50 MG/DL

## 2023-07-17 ENCOUNTER — OUTPATIENT (OUTPATIENT)
Dept: OUTPATIENT SERVICES | Facility: HOSPITAL | Age: 48
LOS: 1 days | End: 2023-07-17
Payer: COMMERCIAL

## 2023-07-17 ENCOUNTER — APPOINTMENT (OUTPATIENT)
Dept: ULTRASOUND IMAGING | Facility: CLINIC | Age: 48
End: 2023-07-17
Payer: COMMERCIAL

## 2023-07-17 ENCOUNTER — RESULT REVIEW (OUTPATIENT)
Age: 48
End: 2023-07-17

## 2023-07-17 DIAGNOSIS — Z98.890 OTHER SPECIFIED POSTPROCEDURAL STATES: Chronic | ICD-10-CM

## 2023-07-17 DIAGNOSIS — D27.1 BENIGN NEOPLASM OF LEFT OVARY: Chronic | ICD-10-CM

## 2023-07-17 DIAGNOSIS — N63.0 UNSPECIFIED LUMP IN UNSPECIFIED BREAST: ICD-10-CM

## 2023-07-17 PROCEDURE — 76641 ULTRASOUND BREAST COMPLETE: CPT | Mod: 26,50

## 2023-07-17 PROCEDURE — 76641 ULTRASOUND BREAST COMPLETE: CPT

## 2023-08-04 ENCOUNTER — EMERGENCY (EMERGENCY)
Facility: HOSPITAL | Age: 48
LOS: 1 days | Discharge: HOME | End: 2023-08-04
Attending: EMERGENCY MEDICINE
Payer: COMMERCIAL

## 2023-08-04 VITALS
HEART RATE: 70 BPM | DIASTOLIC BLOOD PRESSURE: 84 MMHG | SYSTOLIC BLOOD PRESSURE: 100 MMHG | RESPIRATION RATE: 18 BRPM | OXYGEN SATURATION: 99 % | TEMPERATURE: 98 F

## 2023-08-04 VITALS
HEIGHT: 67 IN | SYSTOLIC BLOOD PRESSURE: 122 MMHG | TEMPERATURE: 98 F | HEART RATE: 71 BPM | WEIGHT: 209 LBS | DIASTOLIC BLOOD PRESSURE: 80 MMHG | OXYGEN SATURATION: 97 % | RESPIRATION RATE: 20 BRPM

## 2023-08-04 DIAGNOSIS — Z98.890 OTHER SPECIFIED POSTPROCEDURAL STATES: Chronic | ICD-10-CM

## 2023-08-04 DIAGNOSIS — D27.1 BENIGN NEOPLASM OF LEFT OVARY: Chronic | ICD-10-CM

## 2023-08-04 LAB
ALBUMIN SERPL ELPH-MCNC: 4 G/DL — SIGNIFICANT CHANGE UP (ref 3.3–5)
ALP SERPL-CCNC: 78 U/L — SIGNIFICANT CHANGE UP (ref 40–120)
ALT FLD-CCNC: 16 U/L — SIGNIFICANT CHANGE UP (ref 10–45)
ANION GAP SERPL CALC-SCNC: 12 MMOL/L — SIGNIFICANT CHANGE UP (ref 5–17)
APTT BLD: 31.6 SEC — SIGNIFICANT CHANGE UP (ref 24.5–35.6)
AST SERPL-CCNC: 15 U/L — SIGNIFICANT CHANGE UP (ref 10–40)
BASOPHILS # BLD AUTO: 0.06 K/UL — SIGNIFICANT CHANGE UP (ref 0–0.2)
BASOPHILS NFR BLD AUTO: 0.6 % — SIGNIFICANT CHANGE UP (ref 0–2)
BILIRUB SERPL-MCNC: 0.4 MG/DL — SIGNIFICANT CHANGE UP (ref 0.2–1.2)
BUN SERPL-MCNC: 12 MG/DL — SIGNIFICANT CHANGE UP (ref 7–23)
CALCIUM SERPL-MCNC: 9.1 MG/DL — SIGNIFICANT CHANGE UP (ref 8.4–10.5)
CHLORIDE SERPL-SCNC: 107 MMOL/L — SIGNIFICANT CHANGE UP (ref 96–108)
CO2 SERPL-SCNC: 22 MMOL/L — SIGNIFICANT CHANGE UP (ref 22–31)
CREAT SERPL-MCNC: 0.51 MG/DL — SIGNIFICANT CHANGE UP (ref 0.5–1.3)
D DIMER BLD IA.RAPID-MCNC: 209 NG/ML DDU — SIGNIFICANT CHANGE UP
EGFR: 114 ML/MIN/1.73M2 — SIGNIFICANT CHANGE UP
EOSINOPHIL # BLD AUTO: 0.29 K/UL — SIGNIFICANT CHANGE UP (ref 0–0.5)
EOSINOPHIL NFR BLD AUTO: 2.9 % — SIGNIFICANT CHANGE UP (ref 0–6)
GLUCOSE SERPL-MCNC: 94 MG/DL — SIGNIFICANT CHANGE UP (ref 70–99)
HCT VFR BLD CALC: 42.3 % — SIGNIFICANT CHANGE UP (ref 34.5–45)
HGB BLD-MCNC: 13.4 G/DL — SIGNIFICANT CHANGE UP (ref 11.5–15.5)
IMM GRANULOCYTES NFR BLD AUTO: 0.3 % — SIGNIFICANT CHANGE UP (ref 0–0.9)
INR BLD: 1.07 RATIO — SIGNIFICANT CHANGE UP (ref 0.85–1.18)
LYMPHOCYTES # BLD AUTO: 1.63 K/UL — SIGNIFICANT CHANGE UP (ref 1–3.3)
LYMPHOCYTES # BLD AUTO: 16.1 % — SIGNIFICANT CHANGE UP (ref 13–44)
MCHC RBC-ENTMCNC: 27.3 PG — SIGNIFICANT CHANGE UP (ref 27–34)
MCHC RBC-ENTMCNC: 31.7 GM/DL — LOW (ref 32–36)
MCV RBC AUTO: 86.3 FL — SIGNIFICANT CHANGE UP (ref 80–100)
MONOCYTES # BLD AUTO: 0.51 K/UL — SIGNIFICANT CHANGE UP (ref 0–0.9)
MONOCYTES NFR BLD AUTO: 5 % — SIGNIFICANT CHANGE UP (ref 2–14)
NEUTROPHILS # BLD AUTO: 7.59 K/UL — HIGH (ref 1.8–7.4)
NEUTROPHILS NFR BLD AUTO: 75.1 % — SIGNIFICANT CHANGE UP (ref 43–77)
NRBC # BLD: 0 /100 WBCS — SIGNIFICANT CHANGE UP (ref 0–0)
NT-PROBNP SERPL-SCNC: <36 PG/ML — SIGNIFICANT CHANGE UP (ref 0–300)
PLATELET # BLD AUTO: 242 K/UL — SIGNIFICANT CHANGE UP (ref 150–400)
POTASSIUM SERPL-MCNC: 3.9 MMOL/L — SIGNIFICANT CHANGE UP (ref 3.5–5.3)
POTASSIUM SERPL-SCNC: 3.9 MMOL/L — SIGNIFICANT CHANGE UP (ref 3.5–5.3)
PROT SERPL-MCNC: 6.6 G/DL — SIGNIFICANT CHANGE UP (ref 6–8.3)
PROTHROM AB SERPL-ACNC: 11.2 SEC — SIGNIFICANT CHANGE UP (ref 9.5–13)
RBC # BLD: 4.9 M/UL — SIGNIFICANT CHANGE UP (ref 3.8–5.2)
RBC # FLD: 14.4 % — SIGNIFICANT CHANGE UP (ref 10.3–14.5)
SODIUM SERPL-SCNC: 141 MMOL/L — SIGNIFICANT CHANGE UP (ref 135–145)
TROPONIN T, HIGH SENSITIVITY RESULT: 8 NG/L — SIGNIFICANT CHANGE UP (ref 0–51)
WBC # BLD: 10.11 K/UL — SIGNIFICANT CHANGE UP (ref 3.8–10.5)
WBC # FLD AUTO: 10.11 K/UL — SIGNIFICANT CHANGE UP (ref 3.8–10.5)

## 2023-08-04 PROCEDURE — 93005 ELECTROCARDIOGRAM TRACING: CPT

## 2023-08-04 PROCEDURE — 83880 ASSAY OF NATRIURETIC PEPTIDE: CPT

## 2023-08-04 PROCEDURE — 85025 COMPLETE CBC W/AUTO DIFF WBC: CPT

## 2023-08-04 PROCEDURE — 71046 X-RAY EXAM CHEST 2 VIEWS: CPT | Mod: 26

## 2023-08-04 PROCEDURE — 85379 FIBRIN DEGRADATION QUANT: CPT

## 2023-08-04 PROCEDURE — 99285 EMERGENCY DEPT VISIT HI MDM: CPT | Mod: 25

## 2023-08-04 PROCEDURE — 85730 THROMBOPLASTIN TIME PARTIAL: CPT

## 2023-08-04 PROCEDURE — 80053 COMPREHEN METABOLIC PANEL: CPT

## 2023-08-04 PROCEDURE — 85610 PROTHROMBIN TIME: CPT

## 2023-08-04 PROCEDURE — 84484 ASSAY OF TROPONIN QUANT: CPT

## 2023-08-04 PROCEDURE — 99285 EMERGENCY DEPT VISIT HI MDM: CPT

## 2023-08-04 PROCEDURE — 36415 COLL VENOUS BLD VENIPUNCTURE: CPT

## 2023-08-04 PROCEDURE — 71046 X-RAY EXAM CHEST 2 VIEWS: CPT

## 2023-08-04 RX ORDER — ACETAMINOPHEN 500 MG
975 TABLET ORAL ONCE
Refills: 0 | Status: COMPLETED | OUTPATIENT
Start: 2023-08-04 | End: 2023-08-04

## 2023-08-04 RX ORDER — LIDOCAINE 4 G/100G
1 CREAM TOPICAL ONCE
Refills: 0 | Status: COMPLETED | OUTPATIENT
Start: 2023-08-04 | End: 2023-08-04

## 2023-08-04 RX ADMIN — Medication 975 MILLIGRAM(S): at 09:27

## 2023-08-04 RX ADMIN — LIDOCAINE 1 PATCH: 4 CREAM TOPICAL at 09:27

## 2023-08-04 NOTE — ED PROVIDER NOTE - ATTENDING APP SHARED VISIT CONTRIBUTION OF CARE
47F w/ PMHx of CAD s/p PCI of LAD in 2020, and circumflex recently, HTN, HLD, anxiety, GERD presents to  ED c/o mid back pain left of shoulder blades, not reproducible on exam but says worse with movement and breating, dimer sent with vss, no ekg =changes the past few days but has been having these sts since her last cath but feels different then prior mi, says his cards dr leonardo thought it was anxiety related, but today appears more msk in nature, works in restaurant, cardiac work up with a dimer ordered.   weeks ago. Pt states that symptoms are not similar to prior heart attacks.

## 2023-08-04 NOTE — ED ADULT NURSE NOTE - NSFALLHARMRISKINTERV_ED_ALL_ED

## 2023-08-04 NOTE — ED PROVIDER NOTE - PHYSICAL EXAMINATION
A&Ox3, NAD, well appearing  no thoracic back ttp, no scapular ttp, no ttp over the ribs of the thorax, no rash  Lungs CTAB. No w/r/r  Cardiac +S1S2, RRR, No m/r/g.   Abd soft, NT/ND, +BS, no rebound or guarding.   Extremities: no edema.   Skin without rash.   No focal Deficits, Gait steady.

## 2023-08-04 NOTE — ED PROVIDER NOTE - PROGRESS NOTE DETAILS
Patient reevaluated, discussed blood work.  Discussed blood work with patient's cardiologist Dr. Giang, who is recommending echocardiogram.  Discussed with patient recommendation for echocardiogram however patient declining echo at this time and would like to have test done outpatient.  Discussed with Dr Giang who is agreeable with patient being discharged and following up with him for outpatient echocardiogram. -Maria R Saavedra PA-C

## 2023-08-04 NOTE — ED ADULT NURSE NOTE - OBJECTIVE STATEMENT
47 yo female with a PMH of HTN, HLD, MI x 3 stents on Brilinta presents to the ED from home complaining of back pain radiating to the L chest. Patient reports she was sleeping last night and woke up due to L back pain radiating to the L chest that was sharp and constant in nature. Patient reports taking all her medications this morning PTA. Upon arrival, patient placed on cardiac monitor, NSR noted. Patient appears well at this time. Denies headache, dizziness, vision changes, shortness of breath, abdominal pain, nausea, vomiting, diarrhea, fevers, chills, dysuria, hematuria, recent illness travel or fall.

## 2023-08-04 NOTE — ED PROVIDER NOTE - OBJECTIVE STATEMENT
48-year-old female with past medical history of HTN, HLD, CAD status post stents (last stent March, 2023) here for evaluation of left thoracic back pain radiating under her axilla, constant, worse with deep inspiration and movement of the upper extremities but intermittently worsening over the past few weeks.  Patient states pain was initially very dull and has since been worsening.  Patient will take Motrin at times for pain which will help improve the pain.  Last night she says the pain was worse than usual, called her cardiologist Dr. Giang who recommended she come to the emergency room for evaluation. Patient took baby aspirin this morning, denies cough, shortness of breath, difficulty breathing, nausea, vomiting, diaphoresis.  No leg pain or swelling, no history of DVTs.

## 2023-08-04 NOTE — ED PROVIDER NOTE - NSFOLLOWUPINSTRUCTIONS_ED_ALL_ED_FT
- stay hydrated.   -take all home medications as prescribed  - take tylenol 975mg and ibuprofen 600mg every 6 hours as needed for pain-take with meals.  -replace lidocaine patches every 12 hours as needed for pain     - follow up with Dr. Giang this week    - return if symptoms worsen, worsening chest pain, shortness of breath, difficulty breathing and all other concerns.

## 2023-08-04 NOTE — ED PROVIDER NOTE - PATIENT PORTAL LINK FT
You can access the FollowMyHealth Patient Portal offered by [facility] by registering at the following website: [email or web address]. By joining [employer]’s FollowMyHealth portal, you will also be able to view your health information using other applications (apps) compatible with our system.

## 2023-08-04 NOTE — ED ADULT NURSE NOTE - NS ED NURSE LEVEL OF CONSCIOUSNESS AFFECT
Reached out to patient mother she LVM about calling her to check in for her appointment for today 09/19 at 3:30pm. Calm/Appropriate

## 2023-08-10 NOTE — DISCHARGE NOTE NURSING/CASE MANAGEMENT/SOCIAL WORK - NSTRANSFERBELONGINGSDISPO_GEN_A_NUR
Jose Cohen is a 58 year old male presenting to the walk-in clinic today alone for right hand swelling, and pain x 2 weeks, progressively worsening. Similar symptoms occred to  right ankle weeks before which resolved with topical pain relief. Denies known injury/trauma.    Treatment tried prior to visit: none    Swabs/Specimens collected during rooming process:  None    CHIEF COMPLAINT:    Chief Complaint   Patient presents with   • Hand Swelling     Right         SUBJECTIVE:  Jose Cohen is a pleasant  58 year old male, who requests evaluation for      PAIN  to hand and ankle on the right side.   The pain  Occurred 2-3 weeks hand ach e,ankel x 2-3 weeks   severr right hand pain x 2 d    The pain happened while ad solomon.   He treated it initially with *NONE*.   The patient complained of moderate and increasing pain with movement and has had decreased ROM  (range of motion).      concerned gou t, never had that he is aware  also concerned left toe bunion  Right ankle  sudden [pain upon awakening 2 weeks ago   + smoker   Not drinker     no  fall ; no  break in skin ; no bleeding ;no previous injury/ pain  no numbness/tingling ;  no LOC    no hemoptysis no hematochezia no hematemesis no hematuria no bright red blood per rectum   No  chest pain ;palpitations; shortness of breath; no dizziness/ no lightheadedness; normal sensorium /not confused; no numbness/ tingling; no facial asymmetry; no weakness ; no n/v/d ; no urinary urgency/ discomfort; no weight loss ,no travel ,    REVIEW OF SYSTEMS:  A review of systems was performed and findings relevant to this complaint are included in the HPI.    HISTORIES:  ALLERGIES:  No Known Allergies    MEDICATIONS:  Current Outpatient Medications on File Prior to Visit   Medication Sig Dispense Refill   • tamsulosin (FLOMAX) 0.4 MG Cap Take 1 capsule by mouth 2 times daily 30 minutes after a meal 180 capsule 1   • finasteride (PROSCAR) 5 MG tablet Take 1 tablet by mouth daily. 90  Please advise.     Patient would like a breast US done due to being high risk. [facility] recommends the high risk breast program.   She had a mammogram done on 1/18/22 at [facility]. Results showed dense breast tissue, no masses. Category 1 - negative.    tablet 3   • amLODIPine (NORVASC) 5 MG tablet Take 1 tablet by mouth daily for high blood pressure 90 tablet 3   • valsartan (DIOVAN) 160 MG tablet Take 1 tablet by mouth daily. Indications: High Blood Pressure Disorder 90 tablet 3   • tadalafil (CIALIS) 20 MG tablet Take 1 tablet by mouth daily as needed for Erectile Dysfunction. 20 tablet 5   • atorvastatin (LIPITOR) 20 MG tablet Take 1 tablet by mouth daily. 90 tablet 0   • Omega-3 Fatty Acids (Fish Oil Ultra) 1400 MG Cap Take 1,400 mg by mouth daily.       No current facility-administered medications on file prior to visit.       I have reviewed the past medical history, family history, social history, medications and allergies listed in the medical record as obtained by my nursing staff and support staff and agree with their documentation    OBJECTIVE:  PHYSICAL EXAM:  Visit Vitals  BP (!) 149/94   Pulse 65   Temp 97.6 °F (36.4 °C) (Oral)   Resp 20   Wt 83 kg (183 lb)   SpO2 100%   BMI 28.66 kg/m²     General:  Well hydrated male who appears in mild  acute distress.    Neck:  Anterior and posterior lymphadenopathy.  Supple. Nl ROM   Lungs:  clear no wheeze rale or rhonchi   Cardiac:  Regular rate and rhythm.  No murmur.  Abdomen:  Soft.  Nontender.  Nondistended.  No hepatomegaly, no splenomegaly. No CVAT   Skin : no rash, lesion, or  pallor    Extremity: no cyanosis normal strength normal ROM    Left toe 1 <20 degree xt rotattino at 1MTP   Physical Exam  Musculoskeletal:      Right hand: Swelling and tenderness present. No deformity, lacerations or bony tenderness. Decreased range of motion (3rd finger only). Decreased strength (3rd finger flex ). Normal sensation. There is no disruption of two-point discrimination. Normal capillary refill. Normal pulse.        Arms:          Lab Services on 08/10/2023   Component Date Value Ref Range Status   • WBC 08/10/2023 5.2  4.2 - 11.0 K/mcL Final   • RBC 08/10/2023 4.60  4.50 - 5.90 mil/mcL Final   • HGB 08/10/2023  13.7  13.0 - 17.0 g/dL Final   • HCT 08/10/2023 41.1  39.0 - 51.0 % Final   • MCV 08/10/2023 89.3  78.0 - 100.0 fl Final   • MCH 08/10/2023 29.8  26.0 - 34.0 pg Final   • MCHC 08/10/2023 33.3  32.0 - 36.5 g/dL Final   • RDW-CV 08/10/2023 13.0  11.0 - 15.0 % Final   • RDW-SD 08/10/2023 43.2  39.0 - 50.0 fL Final   • PLT 08/10/2023 207  140 - 450 K/mcL Final   • Neutrophil, Percent 08/10/2023 52  % Final   • Lymphocytes, Percent 08/10/2023 39  % Final   • Mono, Percent 08/10/2023 8  % Final   • Eosinophils, Percent 08/10/2023 0  % Final   • Basophils, Percent 08/10/2023 1  % Final   • Immature Granulocytes 08/10/2023 0  % Final   • Absolute Neutrophils 08/10/2023 2.6  1.8 - 7.7 K/mcL Final   • Absolute Lymphocytes 08/10/2023 2.0  1.0 - 4.0 K/mcL Final   • Absolute Monocytes 08/10/2023 0.4  0.3 - 0.9 K/mcL Final   • Absolute Eosinophils  08/10/2023 0.0  0.0 - 0.5 K/mcL Final   • Absolute Basophils 08/10/2023 0.0  0.0 - 0.3 K/mcL Final   • Absolute Immature Granulocytes 08/10/2023 0.0  0.0 - 0.2 K/mcL Final   Walk In on 08/10/2023   Component Date Value Ref Range Status   • Uric Acid 08/10/2023 4.3  3.5 - 7.2 mg/dL Final    XR HAND 3 OR MORE VIEWS RIGHT    Result Date: 8/10/2023  XR HAND 3 OR MORE VIEWS RIGHT CLINICAL INFORMATION: possible gout swelling MCP 2,3,4,  RIGHT, Effusion, right hand, Essential (primary) hypertension  COMPARISON: None available. FINDINGS /     IMPRESSION:  AP, lateral, and oblique views. 1.   No acute fracture or malalignment. 2.   No other significant osseous or soft tissue abnormality. No significant osteoarthropathy. //Location Code:  Electronically Signed by: Zarina Montano MD Signed on: 8/10/2023 9:57 AM Workstation ID: [id number]     D/w pt before results   Sx/s c/w gout or inflam arthitis   Use meds and need f/u pcp   recommend toe wedge , btw 1&2 then f;u       ASSESSMENT:  1. Swelling of joint, hand, right    2. Hypertension, unspecified type    3. Bunion    4. Pain in finger of right hand     5. Arthritis of hand          PLAN:  Orders Placed This Encounter   • XR HAND 3 OR MORE VIEWS RIGHT   • CBC with Automated Differential   • Uric Acid   • diclofenac (VOLTAREN) 1 % gel   • allopurinol (ZYLOPRIM) 100 MG tablet   • colchicine (COLCRYS) 0.6 MG tablet   • predniSONE (DELTASONE) 10 MG tablet       No follow-ups on file.      General Orthopedic referral   > [place]:  [phone]  >[place]  [address], [place]:  [phone]  >[address]   in [place]:  [phone]  >[place] & [address] [facility] : [phone]  >[facility] : [phone]        Over the counter medications:  -Alternate tylenol 500mg every 8 hours   -Muscle creams:    Bio-freeze  [place] gel  arnica  epsom salt paste: 1/4 c Epsom + 2 tblsp h20 > mix to gritty paste, apply directly  and cover w/ very warm , moist  wash cloth   -Rest the area   -Ice or heat for maximum of 20 minutes 3xday      Ordered from Pharmacy   See orders     PATIENT INSTRUCTIONS:      Refer to AVS   The patient was advised to follow up with primary physician or to recheck with the urgent care clinic sooner if symptoms get worse or if new symptoms appear.  Patient expressed understanding and appreciation for care and explanation   Portions of this note are brought forward from RN  note; reviewed and edited by me as appropriate.    not applicable

## 2023-08-31 ENCOUNTER — APPOINTMENT (OUTPATIENT)
Dept: INTERNAL MEDICINE | Facility: CLINIC | Age: 48
End: 2023-08-31
Payer: COMMERCIAL

## 2023-08-31 VITALS
RESPIRATION RATE: 16 BRPM | HEART RATE: 78 BPM | SYSTOLIC BLOOD PRESSURE: 122 MMHG | WEIGHT: 206 LBS | HEIGHT: 67 IN | OXYGEN SATURATION: 98 % | DIASTOLIC BLOOD PRESSURE: 80 MMHG | BODY MASS INDEX: 32.33 KG/M2

## 2023-08-31 DIAGNOSIS — M54.9 DORSALGIA, UNSPECIFIED: ICD-10-CM

## 2023-08-31 PROCEDURE — 99214 OFFICE O/P EST MOD 30 MIN: CPT

## 2023-08-31 NOTE — HISTORY OF PRESENT ILLNESS
[FreeTextEntry1] : HTN, HLD, CAD, ovarian cyst, anx/dep, insomnia [de-identified] : 48 year old woman s/p admission to [facility] with AWMI ans SP PCI x 2 to LAD with resultant moderate to severe LV dysfunction - lv dysfunction recovered on echo, but has subsequent THIAGO to PRox RCA 80%.. Also with hx HTn, HLD, ovarian cyst, anx with depression, insomnia.  s/p Robotic RSO, extensive lysis of adhesions, resection of peritoneal nodule s/p admission 11/22/21 - 11/23/21 with chest pains. Reports that the pain started in the back. she was seen by cardiology - cxr neg, echo with improved EF, diastolic dysfunction, Stress okay. CBC nl, Cr 0.58, A1c 5.6, lft nl, chol 139, trig 93, HDL 47, LDL 73. no palpitations, dyspnea. with occ dizziness attributed to the diuretic. diuretic changed to HCTZ and potassium added. Reports sx improved, but has returned with occ rt and lt medial chest pains. + some pains under the breasts. No current CP. no cough or wheezing. no fevers. Pain triggered/exacerbated by sitting and thinking about the pain. Better with moving around and doing things. + some fatigue.   Cardiology - Dr Giang - [phone] meds have been changed since the hospital - on losartan/hctz, potassium, lasix stopped, brilinta was stopped.   completed cardiac rehab. Has been following with Cardiology. s/p admission 3/11-13/23 - on 3/12/23 for cardiac w/u s/p LHC with THIAGO to PRox RCA 80%.Pt to continue with asa and plavix. to continue with statin. diuretic changed to amlodipine for suspected microvascular dz. Reprots wt gain since getting out of the hospital. restarted the diuretic. Has just been started on Praulent.  statin was held.  Still from some body aches from the statin. Has followed up and had echo for lt back pain to the chest continues to have pain from the left upper back to the lt side and anteriorly.  Like a band.  + some relief with advil and lidocaine patch. no relief with tylenol.   Back has been improved.  Has been intermittent, no current pain. Notes and recent labs from the ER d/w pt.  Reports that she had lipids done with cardiology the week after the Er visit.    Hx smoking - was cutting down, trying to quit. Had changed to vaping - quit.  continues to abstain cut down on the drinking - has been better - reports now rare Has been walking daily. Has been following diet. Taking meds w/o issues.  No cv sx.  no GI sx. no pain, nausea, vomiting diarrhea, constipation.  no noted neuro sx.  Taking melatonin as needed. SAw GI - had cologaurd 8/21 Reports that she has been having anxiety - Denies depression. Has been improved since she stopped working. did not like the way the ssri made her feel. not taking. Has been taking the benzo as needed - Has been lasting > 1 mo. now going through divorce. continues to be under stress, still plans on using only as needed and cutting down when the stressors improve (divorce and selling her house).  Taking the benzo only as needed.   shoulder has been better.  vaccines - reported as up to date for flu vaccine  Mammo - to make appt for 6 mo f/u gyn- up to date. ophtho - due.

## 2023-08-31 NOTE — HEALTH RISK ASSESSMENT
[Yes] : Yes [Monthly or less (1 pt)] : Monthly or less (1 point) [1 or 2 (0 pts)] : 1 or 2 (0 points) [Never (0 pts)] : Never (0 points) [No] : In the past 12 months have you used drugs other than those required for medical reasons? No [0] : 2) Feeling down, depressed, or hopeless: Not at all (0) [PHQ-2 Negative - No further assessment needed] : PHQ-2 Negative - No further assessment needed [Former] : Former [Audit-CScore] : 1 [[id number]] : 0

## 2023-08-31 NOTE — PHYSICAL EXAM
[No Acute Distress] : no acute distress [Well Nourished] : well nourished [Well Developed] : well developed [Well-Appearing] : well-appearing [Normal Voice/Communication] : normal voice/communication [Normal Sclera/Conjunctiva] : normal sclera/conjunctiva [PERRL] : pupils equal round and reactive to light [EOMI] : extraocular movements intact [Normal Outer Ear/Nose] : the outer ears and nose were normal in appearance [Normal Oropharynx] : the oropharynx was normal [No JVD] : no jugular venous distention [No Lymphadenopathy] : no lymphadenopathy [Supple] : supple [Thyroid Normal, No Nodules] : the thyroid was normal and there were no nodules present [No Respiratory Distress] : no respiratory distress  [No Accessory Muscle Use] : no accessory muscle use [Clear to Auscultation] : lungs were clear to auscultation bilaterally [Normal Rate] : normal rate  [Regular Rhythm] : with a regular rhythm [Normal S1, S2] : normal S1 and S2 [No Murmur] : no murmur heard [No Carotid Bruits] : no carotid bruits [No Abdominal Bruit] : a ~M bruit was not heard ~T in the abdomen [Pedal Pulses Present] : the pedal pulses are present [No Edema] : there was no peripheral edema [No Palpable Aorta] : no palpable aorta [Soft] : abdomen soft [Non Tender] : non-tender [Non-distended] : non-distended [No Masses] : no abdominal mass palpated [No HSM] : no HSM [Normal Bowel Sounds] : normal bowel sounds [No CVA Tenderness] : no CVA  tenderness [No Spinal Tenderness] : no spinal tenderness [No Joint Swelling] : no joint swelling [Grossly Normal Strength/Tone] : grossly normal strength/tone [Coordination Grossly Intact] : coordination grossly intact [No Focal Deficits] : no focal deficits [Normal Gait] : normal gait [Speech Grossly Normal] : speech grossly normal [Memory Grossly Normal] : memory grossly normal [Normal Affect] : the affect was normal [Alert and Oriented x3] : oriented to person, place, and time [Normal Mood] : the mood was normal [Normal Insight/Judgement] : insight and judgment were intact [Normal Posterior Cervical Nodes] : no posterior cervical lymphadenopathy [Normal Anterior Cervical Nodes] : no anterior cervical lymphadenopathy [de-identified] : obese [de-identified] : no noted tenderness or spasm.  no rib tenderness

## 2023-09-23 ENCOUNTER — APPOINTMENT (OUTPATIENT)
Dept: MRI IMAGING | Facility: CLINIC | Age: 48
End: 2023-09-23

## 2023-09-29 ENCOUNTER — APPOINTMENT (OUTPATIENT)
Dept: OBGYN | Facility: CLINIC | Age: 48
End: 2023-09-29
Payer: COMMERCIAL

## 2023-09-29 VITALS
WEIGHT: 202 LBS | SYSTOLIC BLOOD PRESSURE: 100 MMHG | DIASTOLIC BLOOD PRESSURE: 68 MMHG | BODY MASS INDEX: 31.71 KG/M2 | HEIGHT: 67 IN

## 2023-09-29 DIAGNOSIS — Z01.419 ENCOUNTER FOR GYNECOLOGICAL EXAMINATION (GENERAL) (ROUTINE) W/OUT ABNORMAL FINDINGS: ICD-10-CM

## 2023-09-29 PROCEDURE — 99396 PREV VISIT EST AGE 40-64: CPT

## 2023-10-02 PROBLEM — Z01.419 WELL WOMAN EXAM WITH ROUTINE GYNECOLOGICAL EXAM: Status: ACTIVE | Noted: 2021-05-18

## 2023-10-02 LAB — HPV HIGH+LOW RISK DNA PNL CVX: NOT DETECTED

## 2023-10-04 LAB — CYTOLOGY CVX/VAG DOC THIN PREP: ABNORMAL

## 2023-12-04 ENCOUNTER — APPOINTMENT (OUTPATIENT)
Dept: INTERNAL MEDICINE | Facility: CLINIC | Age: 48
End: 2023-12-04

## 2023-12-05 ENCOUNTER — APPOINTMENT (OUTPATIENT)
Dept: INTERNAL MEDICINE | Facility: CLINIC | Age: 48
End: 2023-12-05
Payer: COMMERCIAL

## 2023-12-05 VITALS
HEIGHT: 67 IN | RESPIRATION RATE: 16 BRPM | DIASTOLIC BLOOD PRESSURE: 80 MMHG | WEIGHT: 204 LBS | BODY MASS INDEX: 32.02 KG/M2 | SYSTOLIC BLOOD PRESSURE: 126 MMHG | OXYGEN SATURATION: 98 % | HEART RATE: 90 BPM

## 2023-12-05 DIAGNOSIS — R07.89 OTHER CHEST PAIN: ICD-10-CM

## 2023-12-05 DIAGNOSIS — Z23 ENCOUNTER FOR IMMUNIZATION: ICD-10-CM

## 2023-12-05 PROCEDURE — 90686 IIV4 VACC NO PRSV 0.5 ML IM: CPT

## 2023-12-05 PROCEDURE — 99214 OFFICE O/P EST MOD 30 MIN: CPT | Mod: 25

## 2023-12-05 PROCEDURE — 90471 IMMUNIZATION ADMIN: CPT

## 2023-12-05 RX ORDER — ATORVASTATIN CALCIUM 40 MG/1
40 TABLET, FILM COATED ORAL DAILY
Qty: 1 | Refills: 3 | Status: DISCONTINUED | COMMUNITY
End: 2023-12-05

## 2023-12-05 RX ORDER — SEMAGLUTIDE 1.34 MG/ML
4 INJECTION, SOLUTION SUBCUTANEOUS
Qty: 3 | Refills: 2 | Status: ACTIVE | COMMUNITY
Start: 2023-03-16

## 2023-12-09 ENCOUNTER — OUTPATIENT (OUTPATIENT)
Dept: OUTPATIENT SERVICES | Facility: HOSPITAL | Age: 48
LOS: 1 days | End: 2023-12-09
Payer: COMMERCIAL

## 2023-12-09 ENCOUNTER — APPOINTMENT (OUTPATIENT)
Dept: ULTRASOUND IMAGING | Facility: CLINIC | Age: 48
End: 2023-12-09
Payer: COMMERCIAL

## 2023-12-09 DIAGNOSIS — Z98.890 OTHER SPECIFIED POSTPROCEDURAL STATES: Chronic | ICD-10-CM

## 2023-12-09 DIAGNOSIS — Z00.8 ENCOUNTER FOR OTHER GENERAL EXAMINATION: ICD-10-CM

## 2023-12-09 DIAGNOSIS — D27.1 BENIGN NEOPLASM OF LEFT OVARY: Chronic | ICD-10-CM

## 2023-12-09 DIAGNOSIS — R59.0 LOCALIZED ENLARGED LYMPH NODES: ICD-10-CM

## 2023-12-09 PROCEDURE — 76536 US EXAM OF HEAD AND NECK: CPT

## 2023-12-09 PROCEDURE — 76536 US EXAM OF HEAD AND NECK: CPT | Mod: 26

## 2023-12-14 LAB
25(OH)D3 SERPL-MCNC: 23.5 NG/ML
ALBUMIN SERPL ELPH-MCNC: 4.3 G/DL
ALP BLD-CCNC: 90 U/L
ALT SERPL-CCNC: 11 U/L
ANION GAP SERPL CALC-SCNC: 12 MMOL/L
APPEARANCE: CLEAR
AST SERPL-CCNC: 11 U/L
BACTERIA UR CULT: NORMAL
BACTERIA: ABNORMAL /HPF
BASOPHILS # BLD AUTO: 0.08 K/UL
BASOPHILS NFR BLD AUTO: 1.3 %
BILIRUB SERPL-MCNC: 0.5 MG/DL
BILIRUBIN URINE: NEGATIVE
BLOOD URINE: NEGATIVE
BUN SERPL-MCNC: 9 MG/DL
CALCIUM SERPL-MCNC: 9.2 MG/DL
CAST: 1 /LPF
CHLORIDE SERPL-SCNC: 107 MMOL/L
CO2 SERPL-SCNC: 25 MMOL/L
COLOR: YELLOW
CREAT SERPL-MCNC: 0.53 MG/DL
EGFR: 113 ML/MIN/1.73M2
EOSINOPHIL # BLD AUTO: 0.3 K/UL
EOSINOPHIL NFR BLD AUTO: 5 %
EPITHELIAL CELLS: 7 /HPF
ESTIMATED AVERAGE GLUCOSE: 103 MG/DL
FRUCTOSAMINE SERPL-MCNC: 198 UMOL/L
GLUCOSE QUALITATIVE U: NEGATIVE MG/DL
GLUCOSE SERPL-MCNC: 101 MG/DL
HBA1C MFR BLD HPLC: 5.2 %
HCT VFR BLD CALC: 43.3 %
HGB BLD-MCNC: 13.4 G/DL
IMM GRANULOCYTES NFR BLD AUTO: 0.2 %
KETONES URINE: NEGATIVE MG/DL
LEUKOCYTE ESTERASE URINE: ABNORMAL
LYMPHOCYTES # BLD AUTO: 1.74 K/UL
LYMPHOCYTES NFR BLD AUTO: 28.9 %
MAGNESIUM SERPL-MCNC: 2.1 MG/DL
MAN DIFF?: NORMAL
MCHC RBC-ENTMCNC: 27.2 PG
MCHC RBC-ENTMCNC: 30.9 GM/DL
MCV RBC AUTO: 88 FL
MICROSCOPIC-UA: NORMAL
MONOCYTES # BLD AUTO: 0.37 K/UL
MONOCYTES NFR BLD AUTO: 6.1 %
NEUTROPHILS # BLD AUTO: 3.53 K/UL
NEUTROPHILS NFR BLD AUTO: 58.5 %
NITRITE URINE: NEGATIVE
PH URINE: 6.5
PLATELET # BLD AUTO: 275 K/UL
POTASSIUM SERPL-SCNC: 4.5 MMOL/L
PROT SERPL-MCNC: 6.9 G/DL
PROTEIN URINE: NEGATIVE MG/DL
RBC # BLD: 4.92 M/UL
RBC # FLD: 15.2 %
RED BLOOD CELLS URINE: 2 /HPF
SODIUM SERPL-SCNC: 143 MMOL/L
SPECIFIC GRAVITY URINE: 1.02
TSH SERPL-ACNC: 0.89 UIU/ML
UROBILINOGEN URINE: 0.2 MG/DL
WBC # FLD AUTO: 6.03 K/UL
WHITE BLOOD CELLS URINE: 1 /HPF

## 2024-01-16 ENCOUNTER — APPOINTMENT (OUTPATIENT)
Dept: INTERNAL MEDICINE | Facility: CLINIC | Age: 49
End: 2024-01-16
Payer: SELF-PAY

## 2024-01-16 VITALS
BODY MASS INDEX: 31.86 KG/M2 | WEIGHT: 203 LBS | SYSTOLIC BLOOD PRESSURE: 116 MMHG | OXYGEN SATURATION: 98 % | RESPIRATION RATE: 16 BRPM | HEART RATE: 84 BPM | DIASTOLIC BLOOD PRESSURE: 70 MMHG | HEIGHT: 67 IN

## 2024-01-16 DIAGNOSIS — I21.3 ST ELEVATION (STEMI) MYOCARDIAL INFARCTION OF UNSPECIFIED SITE: ICD-10-CM

## 2024-01-16 DIAGNOSIS — N63.0 UNSPECIFIED LUMP IN UNSPECIFIED BREAST: ICD-10-CM

## 2024-01-16 DIAGNOSIS — R30.0 DYSURIA: ICD-10-CM

## 2024-01-16 PROCEDURE — G2211 COMPLEX E/M VISIT ADD ON: CPT

## 2024-01-16 PROCEDURE — 99214 OFFICE O/P EST MOD 30 MIN: CPT

## 2024-01-16 NOTE — HEALTH RISK ASSESSMENT
[Yes] : Yes [Monthly or less (1 pt)] : Monthly or less (1 point) [1 or 2 (0 pts)] : 1 or 2 (0 points) [Never (0 pts)] : Never (0 points) [No] : In the past 12 months have you used drugs other than those required for medical reasons? No [0] : 2) Feeling down, depressed, or hopeless: Not at all (0) [PHQ-2 Negative - No further assessment needed] : PHQ-2 Negative - No further assessment needed [Former] : Former [Audit-CScore] : 1 [[id number]] : 0

## 2024-01-16 NOTE — PHYSICAL EXAM
[No Acute Distress] : no acute distress [Well Nourished] : well nourished [Well Developed] : well developed [Well-Appearing] : well-appearing [Normal Voice/Communication] : normal voice/communication [Normal Sclera/Conjunctiva] : normal sclera/conjunctiva [PERRL] : pupils equal round and reactive to light [EOMI] : extraocular movements intact [Normal Outer Ear/Nose] : the outer ears and nose were normal in appearance [Normal Oropharynx] : the oropharynx was normal [No JVD] : no jugular venous distention [Supple] : supple [Thyroid Normal, No Nodules] : the thyroid was normal and there were no nodules present [No Respiratory Distress] : no respiratory distress  [No Accessory Muscle Use] : no accessory muscle use [Clear to Auscultation] : lungs were clear to auscultation bilaterally [Normal Rate] : normal rate  [Regular Rhythm] : with a regular rhythm [Normal S1, S2] : normal S1 and S2 [No Murmur] : no murmur heard [No Carotid Bruits] : no carotid bruits [No Abdominal Bruit] : a ~M bruit was not heard ~T in the abdomen [Pedal Pulses Present] : the pedal pulses are present [No Edema] : there was no peripheral edema [No Palpable Aorta] : no palpable aorta [Soft] : abdomen soft [Non Tender] : non-tender [Non-distended] : non-distended [No Masses] : no abdominal mass palpated [No HSM] : no HSM [Normal Bowel Sounds] : normal bowel sounds [Normal Anterior Cervical Nodes] : no anterior cervical lymphadenopathy [No CVA Tenderness] : no CVA  tenderness [No Spinal Tenderness] : no spinal tenderness [No Joint Swelling] : no joint swelling [Grossly Normal Strength/Tone] : grossly normal strength/tone [Coordination Grossly Intact] : coordination grossly intact [No Focal Deficits] : no focal deficits [Normal Gait] : normal gait [Speech Grossly Normal] : speech grossly normal [Memory Grossly Normal] : memory grossly normal [Normal Affect] : the affect was normal [Alert and Oriented x3] : oriented to person, place, and time [Normal Mood] : the mood was normal [Normal Insight/Judgement] : insight and judgment were intact [de-identified] : obese [de-identified] : lt lower posterior small node

## 2024-02-23 NOTE — ED PROVIDER NOTE - IV ALTEPLASE DOOR HIDDEN
Patient Education       Well Child Exam 2 Months   About this topic   Your baby's 2-month well child exam is a visit with the doctor to check your baby's health. The doctor measures your child's weight, height, and head size. The doctor plots these numbers on a growth curve. The growth curve gives a picture of your baby's growth at each visit. The doctor may listen to your baby's heart, lungs, and belly. Your doctor will do a full exam of your baby from the head to the toes.  Your baby may also need shots or blood tests during this visit.  General   Growth and Development   Your doctor will ask you how your baby is developing. The doctor will focus on the skills that most children your child's age are expected to do. During the first months of your child's life, here are some things you can expect.  Movement - Your baby may:  Lift the head up when lying on the belly  Hold a small toy or rattle when you place it in the hand  Hearing, seeing, and talking - Your baby will likely:  Know your face and voice  Enjoy hearing you sing or talk  Start to smile at people  Begin making cooing sounds  Start to follow things with the eyes  Still have their eyes cross or wander from time to time  Act fussy if bored or activity doesnt change  Feeding - Your baby:  Needs breast milk or formula for nutrition. Always hold your baby when feeding. Do not prop a bottle. Propping the bottle makes it easier for your baby to choke and get ear infections.  Should not yet have baby cereal, juice, cows milk, or other food unless instructed by your doctor. Your baby's body is not ready for these foods yet. Your baby does not need to have water.  May needed burped often if your baby has problems with spitting up. Hold your baby upright for about an hour after feeding to help with spitting up.  May put hands in the mouth, root, or suck to show hunger  Should not be overfed. Turning away, closing the mouth, and relaxing arms are signs your baby  is full.  Sleep - Your child:  Sleeps for about 2 to 4 hours at a time. May start to sleep for longer stretches of time at night.  Is likely sleeping about 14 to 16 hours total out of each day, with 4 to 5 daytime naps.  May sleep better when swaddled. Monitor your baby when swaddled. Check to make sure your baby has not rolled over. Also, make sure the swaddle blanket has not come loose. Keep the swaddle blanket loose around your babys hips. Stop swaddling your baby before your baby starts to roll over. Most times, you will need to stop swaddling your baby by 2 months of age.  Should always sleep on the back, in your child's own bed, on a firm mattress  Vaccines - It is important for your baby to get vaccines on time. This protects from very serious illnesses like lung infections, meningitis, or infections that damage their nervous system. Most vaccines are given by shot, and others are given orally as a drink or pill. Your baby may need:  DTaP or diphtheria, tetanus, and pertussis vaccine  Hib or Haemophilus influenzae type b vaccine  IPV or polio vaccine  PCV or pneumococcal conjugate vaccine  RV or rotavirus vaccine  Hep B or hepatitis B vaccine  Some of these vaccines may be given as combined vaccines. This means your child may get fewer shots.  Help for Parents   Develop bathing, sleeping, feeding, napping, and playing routines.  Play with your baby.  Keep doing tummy time a few times each day while your baby is awake. Lie your baby on your chest and talk or sing to your baby. Put toys in front of your baby when lying on the tummy. This will encourage your baby to raise the head.  Talk or sing to your baby often. Respond when your baby makes sounds.  Use an infant gym or hold a toy slightly out of your baby's reach. This lets your baby look at it and reach for the toy.  Gently, clap your baby's hands or feet together. Rub them over different kinds of materials.  Slowly, move a toy in front of your baby's eyes  so your baby can follow the toy.  Here are some things you can do to help keep your baby safe and healthy.  Learn CPR and basic first aid.  Do not allow anyone to smoke in your home or around your baby. Second hand smoke can harm your baby.  Have the right size car seat for your baby and use it every time your baby is in the car. Your baby should be rear facing until 2 years of age.  Always place your baby on the back for sleep. Keep soft bedding, bumpers, loose blankets, and toys out of your baby's bed.  Keep one hand on your baby whenever you are changing a diaper or clothes to prevent falls.  Keep small toys and objects away from your baby.  Never leave your baby alone in the bath.  Keep your baby in the shade, rather than in the sun. Doctors do not recommend sunscreen until children are 6 months and older.  Parents need to think about:  A plan for going back to work or school  A reliable  or  provider  How to handle bouts of crying or colic. It is normal for your baby to have times that are hard to console. You need a plan for what to do if you are frustrated because it is never OK to shake a baby.  Making a routine for bedtime for your baby  The next well child visit will most likely be when your baby is 4 months old. At this visit your doctor may:  Do a full check up on your baby  Talk about how your baby is sleeping, if your baby has colic, teething, and how well you are coping with your baby  Give your baby the next set of shots       When do I need to call the doctor?   Fever of 100.4°F (38°C) or higher  Problems eating or spits up a lot  Legs and arms are very loose or floppy all the time  Legs and arms are very stiff  Won't stop crying  Doesn't blink or startle with loud sounds  Where can I learn more?   American Academy of Pediatrics  https://www.healthychildren.org/English/ages-stages/toddler/Pages/Milestones-During-The-First-2-Years.aspx   American Academy of  Pediatrics  https://www.healthychildren.org/English/ages-stages/baby/Pages/Hearing-and-Making-Sounds.aspx   Centers for Disease Control and Prevention  https://www.cdc.gov/ncbddd/actearly/milestones/   KidsHealth  https://kidshealth.org/en/parents/growth-2mos.html?ref=search   Last Reviewed Date   2021-05-06  Consumer Information Use and Disclaimer   This information is not specific medical advice and does not replace information you receive from your health care provider. This is only a brief summary of general information. It does NOT include all information about conditions, illnesses, injuries, tests, procedures, treatments, therapies, discharge instructions or life-style choices that may apply to you. You must talk with your health care provider for complete information about your health and treatment options. This information should not be used to decide whether or not to accept your health care providers advice, instructions or recommendations. Only your health care provider has the knowledge and training to provide advice that is right for you.  Copyright   Copyright © 2021 UpToDate, Inc. and its affiliates and/or licensors. All rights reserved.    Children under the age of 2 years will be restrained in a rear facing child safety seat.    show

## 2024-03-01 NOTE — SBIRT NOTE ADULT - NSSBIRTDRINKAM_GEN_A_CORE
The magnesium level is normal.  I don't like the kidney function decreasing, which concerns me that omeprazole is affecting the kidneys.  See if you can reduced omeprazole to every other day.  Take the pepcid 20 mg twice a day.   Drink 8 glasses of water daily.  We should monitor the kidney function in a month. Never

## 2024-04-08 ENCOUNTER — APPOINTMENT (OUTPATIENT)
Dept: INTERNAL MEDICINE | Facility: CLINIC | Age: 49
End: 2024-04-08
Payer: COMMERCIAL

## 2024-04-08 VITALS
BODY MASS INDEX: 31.39 KG/M2 | HEIGHT: 67 IN | HEART RATE: 78 BPM | DIASTOLIC BLOOD PRESSURE: 80 MMHG | SYSTOLIC BLOOD PRESSURE: 126 MMHG | WEIGHT: 200 LBS | OXYGEN SATURATION: 98 % | RESPIRATION RATE: 16 BRPM

## 2024-04-08 DIAGNOSIS — I50.22 CHRONIC SYSTOLIC (CONGESTIVE) HEART FAILURE: ICD-10-CM

## 2024-04-08 DIAGNOSIS — R59.0 LOCALIZED ENLARGED LYMPH NODES: ICD-10-CM

## 2024-04-08 DIAGNOSIS — I25.10 ATHEROSCLEROTIC HEART DISEASE OF NATIVE CORONARY ARTERY W/OUT ANGINA PECTORIS: ICD-10-CM

## 2024-04-08 DIAGNOSIS — N83.209 UNSPECIFIED OVARIAN CYST, UNSPECIFIED SIDE: ICD-10-CM

## 2024-04-08 DIAGNOSIS — I25.85 CHRONIC CORONARY MICROVASCULAR DYSFUNCTION: ICD-10-CM

## 2024-04-08 DIAGNOSIS — R73.09 OTHER ABNORMAL GLUCOSE: ICD-10-CM

## 2024-04-08 DIAGNOSIS — I10 ESSENTIAL (PRIMARY) HYPERTENSION: ICD-10-CM

## 2024-04-08 DIAGNOSIS — M25.512 PAIN IN LEFT SHOULDER: ICD-10-CM

## 2024-04-08 DIAGNOSIS — E78.5 HYPERLIPIDEMIA, UNSPECIFIED: ICD-10-CM

## 2024-04-08 DIAGNOSIS — F41.9 ANXIETY DISORDER, UNSPECIFIED: ICD-10-CM

## 2024-04-08 DIAGNOSIS — G47.00 INSOMNIA, UNSPECIFIED: ICD-10-CM

## 2024-04-08 DIAGNOSIS — E66.9 OBESITY, UNSPECIFIED: ICD-10-CM

## 2024-04-08 DIAGNOSIS — Z95.5 PRESENCE OF CORONARY ANGIOPLASTY IMPLANT AND GRAFT: ICD-10-CM

## 2024-04-08 LAB
APPEARANCE: ABNORMAL
BACTERIA UR CULT: ABNORMAL
BACTERIA: ABNORMAL /HPF
BILIRUBIN URINE: NEGATIVE
BLOOD URINE: ABNORMAL
CAST: 3 /LPF
COLOR: NORMAL
EPITHELIAL CELLS: 5 /HPF
GLUCOSE QUALITATIVE U: NEGATIVE MG/DL
KETONES URINE: NEGATIVE MG/DL
LEUKOCYTE ESTERASE URINE: ABNORMAL
MICROSCOPIC-UA: NORMAL
NITRITE URINE: NEGATIVE
PH URINE: 7.5
PROTEIN URINE: 30 MG/DL
RED BLOOD CELLS URINE: 4 /HPF
SPECIFIC GRAVITY URINE: 1.02
UROBILINOGEN URINE: 0.2 MG/DL
WHITE BLOOD CELLS URINE: 462 /HPF

## 2024-04-08 PROCEDURE — 99214 OFFICE O/P EST MOD 30 MIN: CPT

## 2024-04-08 PROCEDURE — G2211 COMPLEX E/M VISIT ADD ON: CPT

## 2024-04-08 RX ORDER — NITROFURANTOIN (MONOHYDRATE/MACROCRYSTALS) 25; 75 MG/1; MG/1
100 CAPSULE ORAL
Qty: 10 | Refills: 0 | Status: DISCONTINUED | COMMUNITY
Start: 2024-01-16 | End: 2024-04-08

## 2024-04-08 NOTE — HISTORY OF PRESENT ILLNESS
[FreeTextEntry1] : HTN, HLD, CAD, ovarian cyst, anx/dep, insomnia [de-identified] : 48 year old woman s/p admission to [facility] with AWMI ans SP PCI x 2 to LAD with resultant moderate to severe LV dysfunction - lv dysfunction recovered on echo, but has subsequent THIAGO to PRox RCA 80%.. Also with hx HTn, HLD, ovarian cyst, anx with depression, insomnia.  s/p Robotic RSO, extensive lysis of adhesions, resection of peritoneal nodule s/p admission 11/22/21 - 11/23/21 with chest pains. Reports that the pain started in the back. she was seen by cardiology - cxr neg, echo with improved EF, diastolic dysfunction, Stress okay. CBC nl, Cr 0.58, A1c 5.6, lft nl, chol 139, trig 93, HDL 47, LDL 73. no palpitations, dyspnea. with occ dizziness attributed to the diuretic. diuretic changed to HCTZ and potassium added. Reports sx improved, but has returned with occ rt and lt medial chest pains. + some pains under the breasts. No current CP. no cough or wheezing. no fevers. Pain triggered/exacerbated by sitting and thinking about the pain. Better with moving around and doing things. + some fatigue.   Cardiology - Dr Giang - [phone] meds have been changed since the hospital - on losartan/hctz, potassium, lasix stopped, brilinta was stopped.   completed cardiac rehab. Has been following with Cardiology. s/p admission 3/11-13/23 - on 3/12/23 for cardiac w/u s/p LHC with THIAGO to PRox RCA 80%.Pt to continue with asa and plavix. to continue with statin. diuretic changed to amlodipine for suspected microvascular dz. Reprots wt gain since getting out of the hospital. restarted the diuretic. Has just been started on Praulent.  statin was held.  Still from some body aches from the statin. Has followed up and had echo for lt back pain to the chest continues to have pain from the left upper back to the lt side and anteriorly.  Like a band.  + some relief with advil and lidocaine patch. no relief with tylenol.   Back has been improved.  Has been intermittent, no current pain.  Had accupuncture.  Hx smoking - was cutting down, trying to quit. Had changed to vaping - quit.  continues to abstain cut down on the drinking - has been better - reports now rare Has been walking daily. Has been following diet. Taking meds w/o issues.  No cv sx.  no GI sx. no pain, nausea, vomiting diarrhea, constipation.  no noted neuro sx.  Taking melatonin as needed. SAw GI - had cologaurd 8/21 Reports that she has been having anxiety - Denies depression. Has been improved since she stopped working. did not like the way the ssri made her feel. not taking. Has been taking the benzo as needed - Has been lasting > 1 mo. now going through divorce. continues to be under stress, still plans on using only as needed and cutting down when the stressors improve (divorce and selling her house).  Taking the benzo only as needed.  Has been okay. shoulder has been better. with some dysuria.  asking for UTI testing.    vaccines - reported as up to date for flu vaccine  Mammo - due in march gyn- up to date. ophtho - due.

## 2024-04-08 NOTE — REVIEW OF SYSTEMS
Subjective   History of Present Illness  This is a 52-year-old male with past medical history of renal carcinoma with metastasis to the bone, liver, lungs.  Patient states that he also has a history of sickle cell.  Patient states that he has been since 11 PM last night having 3-4 episodes of bloody stools.  Patient states that he has been having watery stools with some blood in the stools as well.  Patient states that the first bowel movement it was of bright red blood.  Patient states that since then its been more of a maroon-colored blood.  Patient denies any black tarry stools.  Patient states that he has some mild generalized abdominal pain as well.  Patient has no nausea vomiting.  Patient has no chest pain or shortness of breath.  Patient denies any other symptoms at this time.        Review of Systems   Gastrointestinal: Positive for abdominal pain, blood in stool and diarrhea.   All other systems reviewed and are negative.      Past Medical History:   Diagnosis Date   • Cancer (HCC) 2016    Kidney cancer with METS to bone   • Gallstones    • Pneumonia    • Sickle cell        No Known Allergies    Past Surgical History:   Procedure Laterality Date   • CHOLECYSTECTOMY     • LEG SURGERY     • NEPHRECTOMY Right        Family History   Problem Relation Age of Onset   • Leukemia Maternal Grandmother    • Kidney failure Mother    • Hypertension Mother    • Heart disease Mother    • Sickle cell trait Father    • Hypertension Father    • Sickle cell trait Daughter    • Sickle cell trait Cousin    • Anemia Cousin    • Leukemia Other        Social History     Socioeconomic History   • Marital status:    Tobacco Use   • Smoking status: Former   Substance and Sexual Activity   • Alcohol use: Not Currently   • Drug use: Yes     Types: Marijuana           Objective   Physical Exam  Vitals and nursing note reviewed. Exam conducted with a chaperone present.   Constitutional:       Appearance: Normal appearance.    HENT:      Head: Normocephalic and atraumatic.      Mouth/Throat:      Mouth: Mucous membranes are moist.   Eyes:      General: No scleral icterus.  Cardiovascular:      Rate and Rhythm: Normal rate and regular rhythm.      Heart sounds: Normal heart sounds.   Pulmonary:      Effort: Pulmonary effort is normal.      Breath sounds: Normal breath sounds.   Abdominal:      General: Bowel sounds are normal.      Palpations: Abdomen is soft.      Tenderness: There is no abdominal tenderness. There is no guarding.   Genitourinary:     Rectum: Normal. Guaiac result negative.   Skin:     General: Skin is warm and dry.   Neurological:      General: No focal deficit present.      Mental Status: He is alert and oriented to person, place, and time.   Psychiatric:         Mood and Affect: Mood normal.         Behavior: Behavior normal.         Procedures           ED Course                                           Medical Decision Making  Patient is resting comfortably nontoxic-appearing.  Patient is in no acute distress here in the emergency room.  Patient had a rectal exam done.  Patient's guaiac came back negative for any blood.  Patient's hemoglobin was stable.  Advised patient to follow-up with GI on an outpatient basis.  Patient labs and CAT scan were discussed with him and significant other bedside.  Patient will be discharged home in a stable condition.  Patient was advised to return to the emergency room with new or worsening of symptoms.  Patient verbalized understanding and was agreeable to plan as discussed.    Amount and/or Complexity of Data Reviewed  Labs: ordered. Decision-making details documented in ED Course.  Radiology: ordered. Decision-making details documented in ED Course.      Risk  Prescription drug management.          Final diagnoses:   Diarrhea, unspecified type   Enteritis       ED Disposition  ED Disposition     ED Disposition   Discharge    Condition   Stable    Comment   --              Meredith Miller, GILBERTO  [address] [place] [id number]  [phone]    Call       [facility] Emergency Department  [address] [place] [id number] [phone]    As needed, If symptoms worsen         Medication List      No changes were made to your prescriptions during this visit.          Tl Choudhury MD  01/12/23 0951     [Chest Pain] : chest pain [Back Pain] : back pain [Insomnia] : insomnia [Anxiety] : anxiety [Depression] : depression [Negative] : Heme/Lymph [Suicidal] : not suicidal

## 2024-04-08 NOTE — HEALTH RISK ASSESSMENT
[Yes] : Yes [Monthly or less (1 pt)] : Monthly or less (1 point) [1 or 2 (0 pts)] : 1 or 2 (0 points) [Never (0 pts)] : Never (0 points) [No] : In the past 12 months have you used drugs other than those required for medical reasons? No [0] : 2) Feeling down, depressed, or hopeless: Not at all (0) [PHQ-2 Negative - No further assessment needed] : PHQ-2 Negative - No further assessment needed [Former] : Former [Audit-CScore] : 1 [[id number]] : 0

## 2024-04-08 NOTE — PHYSICAL EXAM
[No Acute Distress] : no acute distress [Well Nourished] : well nourished [Well Developed] : well developed [Well-Appearing] : well-appearing [Normal Voice/Communication] : normal voice/communication [Normal Sclera/Conjunctiva] : normal sclera/conjunctiva [PERRL] : pupils equal round and reactive to light [EOMI] : extraocular movements intact [Normal Outer Ear/Nose] : the outer ears and nose were normal in appearance [Normal Oropharynx] : the oropharynx was normal [No JVD] : no jugular venous distention [Supple] : supple [Thyroid Normal, No Nodules] : the thyroid was normal and there were no nodules present [No Respiratory Distress] : no respiratory distress  [No Accessory Muscle Use] : no accessory muscle use [Clear to Auscultation] : lungs were clear to auscultation bilaterally [Normal Rate] : normal rate  [Regular Rhythm] : with a regular rhythm [Normal S1, S2] : normal S1 and S2 [No Murmur] : no murmur heard [No Carotid Bruits] : no carotid bruits [No Abdominal Bruit] : a ~M bruit was not heard ~T in the abdomen [Pedal Pulses Present] : the pedal pulses are present [No Edema] : there was no peripheral edema [No Palpable Aorta] : no palpable aorta [Soft] : abdomen soft [Non Tender] : non-tender [Non-distended] : non-distended [No Masses] : no abdominal mass palpated [No HSM] : no HSM [Normal Bowel Sounds] : normal bowel sounds [Normal Anterior Cervical Nodes] : no anterior cervical lymphadenopathy [No CVA Tenderness] : no CVA  tenderness [No Spinal Tenderness] : no spinal tenderness [No Joint Swelling] : no joint swelling [Grossly Normal Strength/Tone] : grossly normal strength/tone [Coordination Grossly Intact] : coordination grossly intact [No Focal Deficits] : no focal deficits [Normal Gait] : normal gait [Speech Grossly Normal] : speech grossly normal [Memory Grossly Normal] : memory grossly normal [Normal Affect] : the affect was normal [Alert and Oriented x3] : oriented to person, place, and time [Normal Mood] : the mood was normal [Normal Insight/Judgement] : insight and judgment were intact [No Lymphadenopathy] : no lymphadenopathy [Normal Posterior Cervical Nodes] : no posterior cervical lymphadenopathy [de-identified] : obese

## 2024-04-15 RX ORDER — LORAZEPAM 0.5 MG/1
0.5 TABLET ORAL
Qty: 30 | Refills: 0 | Status: ACTIVE | COMMUNITY
Start: 2021-07-07 | End: 1900-01-01

## 2024-05-06 ENCOUNTER — RESULT REVIEW (OUTPATIENT)
Age: 49
End: 2024-05-06

## 2024-05-06 LAB
25(OH)D3 SERPL-MCNC: 27.5 NG/ML
ALBUMIN SERPL ELPH-MCNC: 4.4 G/DL
ALP BLD-CCNC: 78 U/L
ALT SERPL-CCNC: 15 U/L
ANION GAP SERPL CALC-SCNC: 13 MMOL/L
AST SERPL-CCNC: 13 U/L
BASOPHILS # BLD AUTO: 0.1 K/UL
BASOPHILS NFR BLD AUTO: 1.4 %
BILIRUB SERPL-MCNC: 0.5 MG/DL
BUN SERPL-MCNC: 12 MG/DL
CALCIUM SERPL-MCNC: 9.4 MG/DL
CHLORIDE SERPL-SCNC: 105 MMOL/L
CHOLEST SERPL-MCNC: 126 MG/DL
CO2 SERPL-SCNC: 26 MMOL/L
CREAT SERPL-MCNC: 0.76 MG/DL
EGFR: 95 ML/MIN/1.73M2
EOSINOPHIL # BLD AUTO: 0.31 K/UL
EOSINOPHIL NFR BLD AUTO: 4.5 %
ESTIMATED AVERAGE GLUCOSE: 108 MG/DL
GLUCOSE SERPL-MCNC: 91 MG/DL
HBA1C MFR BLD HPLC: 5.4 %
HCT VFR BLD CALC: 45.7 %
HDLC SERPL-MCNC: 56 MG/DL
HGB BLD-MCNC: 13.8 G/DL
IMM GRANULOCYTES NFR BLD AUTO: 0.4 %
LDLC SERPL CALC-MCNC: 54 MG/DL
LYMPHOCYTES # BLD AUTO: 2.08 K/UL
LYMPHOCYTES NFR BLD AUTO: 29.9 %
MAGNESIUM SERPL-MCNC: 2.2 MG/DL
MAN DIFF?: NORMAL
MCHC RBC-ENTMCNC: 27.2 PG
MCHC RBC-ENTMCNC: 30.2 GM/DL
MCV RBC AUTO: 90 FL
MONOCYTES # BLD AUTO: 0.4 K/UL
MONOCYTES NFR BLD AUTO: 5.7 %
NEUTROPHILS # BLD AUTO: 4.04 K/UL
NEUTROPHILS NFR BLD AUTO: 58.1 %
NONHDLC SERPL-MCNC: 70 MG/DL
PLATELET # BLD AUTO: 281 K/UL
POTASSIUM SERPL-SCNC: 4.7 MMOL/L
PROT SERPL-MCNC: 7.2 G/DL
RBC # BLD: 5.08 M/UL
RBC # FLD: 14.8 %
SODIUM SERPL-SCNC: 144 MMOL/L
TRIGL SERPL-MCNC: 80 MG/DL
WBC # FLD AUTO: 6.96 K/UL

## 2024-05-07 DIAGNOSIS — Z12.11 ENCOUNTER FOR SCREENING FOR MALIGNANT NEOPLASM OF COLON: ICD-10-CM

## 2024-06-10 ENCOUNTER — APPOINTMENT (OUTPATIENT)
Dept: DERMATOLOGY | Facility: CLINIC | Age: 49
End: 2024-06-10
Payer: MEDICAID

## 2024-06-10 DIAGNOSIS — D22.9 MELANOCYTIC NEVI, UNSPECIFIED: ICD-10-CM

## 2024-06-10 DIAGNOSIS — D48.5 NEOPLASM OF UNCERTAIN BEHAVIOR OF SKIN: ICD-10-CM

## 2024-06-10 DIAGNOSIS — D36.9 BENIGN NEOPLASM, UNSPECIFIED SITE: ICD-10-CM

## 2024-06-10 DIAGNOSIS — D48.9 NEOPLASM OF UNCERTAIN BEHAVIOR, UNSPECIFIED: ICD-10-CM

## 2024-06-10 DIAGNOSIS — L81.4 OTHER MELANIN HYPERPIGMENTATION: ICD-10-CM

## 2024-06-10 PROCEDURE — 11102 TANGNTL BX SKIN SINGLE LES: CPT

## 2024-06-10 PROCEDURE — 99203 OFFICE O/P NEW LOW 30 MIN: CPT | Mod: 25

## 2024-06-10 NOTE — PHYSICAL EXAM
[Alert] : alert [Well Nourished] : well nourished [Full Body Skin Exam Performed] : performed [FreeTextEntry3] : - Scattered brown macules on the trunk, b/l upper and lower extremities  - Light brown macules on the upper chest and upper back  - 1.2cm asymmetric dark brown patch with irregular borders and lighter brown portions on the upper mid back  - Yellow, thickened nail of the left first toe

## 2024-06-10 NOTE — ASSESSMENT
[FreeTextEntry1] : #Multiple benign nevi  #Solar lentigenes  - Family hx of BCC, mother  - Counseled on adequate sun protection  #Atypical nevus, upper mid back  ddx: Atypical nevus vs. malignant melanoma  - Features concerning for melanoma: asymmetric, irregular borders, 1.2cm lesion, multiple colors/shades of brown - Shave biopsy of lesion today under local anesthesia with 1% lidocaine and epinephrine Hemostasis with aluminum chloride, Vaseline pressure dressing applied Specimen submitted to dermatopathology Will notify the patient of the biopsy results and if any further treatment is necessary.  #Onychomycosis - Pt reports she has been treating with an antifungal nail polish prescribed by an outside provider - Declined further treatment at this time as she is on multiple systemic medications for chronic medical conditions

## 2024-06-10 NOTE — HISTORY OF PRESENT ILLNESS
[FreeTextEntry1] : FBSE [de-identified] : 48 year old female with history of MI 3 years ago and systolic heart failure presenting for full body skin examination. Patient denies personal history of skin cancer. Mother with history of BCC on the face.

## 2024-06-11 ENCOUNTER — APPOINTMENT (OUTPATIENT)
Dept: ULTRASOUND IMAGING | Facility: CLINIC | Age: 49
End: 2024-06-11
Payer: MEDICAID

## 2024-06-11 ENCOUNTER — APPOINTMENT (OUTPATIENT)
Dept: MAMMOGRAPHY | Facility: CLINIC | Age: 49
End: 2024-06-11
Payer: MEDICAID

## 2024-06-11 ENCOUNTER — RESULT REVIEW (OUTPATIENT)
Age: 49
End: 2024-06-11

## 2024-06-11 ENCOUNTER — OUTPATIENT (OUTPATIENT)
Dept: OUTPATIENT SERVICES | Facility: HOSPITAL | Age: 49
LOS: 1 days | End: 2024-06-11
Payer: MEDICAID

## 2024-06-11 DIAGNOSIS — Z98.890 OTHER SPECIFIED POSTPROCEDURAL STATES: Chronic | ICD-10-CM

## 2024-06-11 DIAGNOSIS — D27.1 BENIGN NEOPLASM OF LEFT OVARY: Chronic | ICD-10-CM

## 2024-06-11 DIAGNOSIS — Z00.00 ENCOUNTER FOR GENERAL ADULT MEDICAL EXAMINATION WITHOUT ABNORMAL FINDINGS: ICD-10-CM

## 2024-06-11 PROCEDURE — 77067 SCR MAMMO BI INCL CAD: CPT | Mod: 26

## 2024-06-11 PROCEDURE — 76641 ULTRASOUND BREAST COMPLETE: CPT | Mod: 26,50

## 2024-06-11 PROCEDURE — 77063 BREAST TOMOSYNTHESIS BI: CPT | Mod: 26

## 2024-06-11 PROCEDURE — 76641 ULTRASOUND BREAST COMPLETE: CPT

## 2024-06-11 PROCEDURE — 77063 BREAST TOMOSYNTHESIS BI: CPT

## 2024-06-11 PROCEDURE — 77067 SCR MAMMO BI INCL CAD: CPT

## 2024-06-24 LAB — DERMATOLOGY BIOPSY: NORMAL

## 2024-06-25 ENCOUNTER — NON-APPOINTMENT (OUTPATIENT)
Age: 49
End: 2024-06-25

## 2024-07-08 ENCOUNTER — NON-APPOINTMENT (OUTPATIENT)
Age: 49
End: 2024-07-08

## 2024-07-09 ENCOUNTER — NON-APPOINTMENT (OUTPATIENT)
Age: 49
End: 2024-07-09

## 2024-07-15 ENCOUNTER — APPOINTMENT (OUTPATIENT)
Dept: GASTROENTEROLOGY | Facility: CLINIC | Age: 49
End: 2024-07-15
Payer: MEDICAID

## 2024-07-15 VITALS
SYSTOLIC BLOOD PRESSURE: 124 MMHG | BODY MASS INDEX: 30.29 KG/M2 | HEART RATE: 81 BPM | DIASTOLIC BLOOD PRESSURE: 70 MMHG | OXYGEN SATURATION: 98 % | TEMPERATURE: 97.1 F | HEIGHT: 67 IN | WEIGHT: 193 LBS

## 2024-07-15 DIAGNOSIS — R73.09 OTHER ABNORMAL GLUCOSE: ICD-10-CM

## 2024-07-15 DIAGNOSIS — Z12.11 ENCOUNTER FOR SCREENING FOR MALIGNANT NEOPLASM OF COLON: ICD-10-CM

## 2024-07-15 DIAGNOSIS — I50.22 CHRONIC SYSTOLIC (CONGESTIVE) HEART FAILURE: ICD-10-CM

## 2024-07-15 DIAGNOSIS — I21.3 ST ELEVATION (STEMI) MYOCARDIAL INFARCTION OF UNSPECIFIED SITE: ICD-10-CM

## 2024-07-15 DIAGNOSIS — I25.10 ATHEROSCLEROTIC HEART DISEASE OF NATIVE CORONARY ARTERY W/OUT ANGINA PECTORIS: ICD-10-CM

## 2024-07-15 DIAGNOSIS — E66.9 OBESITY, UNSPECIFIED: ICD-10-CM

## 2024-07-15 DIAGNOSIS — Z95.5 ATHEROSCLEROTIC HEART DISEASE OF NATIVE CORONARY ARTERY W/OUT ANGINA PECTORIS: ICD-10-CM

## 2024-07-15 DIAGNOSIS — Z95.5 PRESENCE OF CORONARY ANGIOPLASTY IMPLANT AND GRAFT: ICD-10-CM

## 2024-07-15 DIAGNOSIS — Z83.719 ENCOUNTER FOR SCREENING FOR MALIGNANT NEOPLASM OF COLON: ICD-10-CM

## 2024-07-15 PROCEDURE — 99204 OFFICE O/P NEW MOD 45 MIN: CPT

## 2024-07-15 RX ORDER — CHROMIUM 200 MCG
TABLET ORAL
Refills: 0 | Status: ACTIVE | COMMUNITY

## 2024-07-15 RX ORDER — MAGNESIUM 100 MG
100 TABLET ORAL
Refills: 0 | Status: ACTIVE | COMMUNITY

## 2024-07-15 RX ORDER — SODIUM SULFATE, POTASSIUM SULFATE AND MAGNESIUM SULFATE 1.6; 3.13; 17.5 G/177ML; G/177ML; G/177ML
17.5-3.13-1.6 SOLUTION ORAL
Qty: 1 | Refills: 0 | Status: ACTIVE | COMMUNITY
Start: 2024-07-15 | End: 1900-01-01

## 2024-07-17 ENCOUNTER — APPOINTMENT (OUTPATIENT)
Dept: DERMATOLOGY | Facility: CLINIC | Age: 49
End: 2024-07-17
Payer: MEDICAID

## 2024-07-17 DIAGNOSIS — C43.59 MALIGNANT MELANOMA OF OTHER PART OF TRUNK: ICD-10-CM

## 2024-07-17 PROCEDURE — 13101 CMPLX RPR TRUNK 2.6-7.5 CM: CPT

## 2024-07-17 PROCEDURE — 11606 EXC TR-EXT MAL+MARG >4 CM: CPT

## 2024-07-17 PROCEDURE — 13102 CMPLX RPR TRUNK ADDL 5CM/<: CPT

## 2024-07-21 ENCOUNTER — NON-APPOINTMENT (OUTPATIENT)
Age: 49
End: 2024-07-21

## 2024-07-23 LAB — DERMATOLOGY BIOPSY: NORMAL

## 2024-08-01 ENCOUNTER — APPOINTMENT (OUTPATIENT)
Dept: DERMATOLOGY | Facility: CLINIC | Age: 49
End: 2024-08-01
Payer: MEDICAID

## 2024-08-01 DIAGNOSIS — Z48.02 ENCOUNTER FOR REMOVAL OF SUTURES: ICD-10-CM

## 2024-08-01 DIAGNOSIS — D48.5 NEOPLASM OF UNCERTAIN BEHAVIOR OF SKIN: ICD-10-CM

## 2024-08-01 PROCEDURE — 99213 OFFICE O/P EST LOW 20 MIN: CPT

## 2024-08-01 NOTE — HISTORY OF PRESENT ILLNESS
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

## 2024-08-01 NOTE — ASSESSMENT
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

## 2024-08-08 ENCOUNTER — APPOINTMENT (OUTPATIENT)
Dept: INTERNAL MEDICINE | Facility: CLINIC | Age: 49
End: 2024-08-08

## 2024-08-08 PROCEDURE — G2211 COMPLEX E/M VISIT ADD ON: CPT | Mod: NC,1L

## 2024-08-08 PROCEDURE — 99214 OFFICE O/P EST MOD 30 MIN: CPT

## 2024-08-08 NOTE — PHYSICAL EXAM
[No Acute Distress] : no acute distress [Well Nourished] : well nourished [Well Developed] : well developed [Well-Appearing] : well-appearing [Normal Voice/Communication] : normal voice/communication [Normal Sclera/Conjunctiva] : normal sclera/conjunctiva [PERRL] : pupils equal round and reactive to light [EOMI] : extraocular movements intact [Normal Outer Ear/Nose] : the outer ears and nose were normal in appearance [Normal Oropharynx] : the oropharynx was normal [No JVD] : no jugular venous distention [No Lymphadenopathy] : no lymphadenopathy [Supple] : supple [Thyroid Normal, No Nodules] : the thyroid was normal and there were no nodules present [No Respiratory Distress] : no respiratory distress  [No Accessory Muscle Use] : no accessory muscle use [Clear to Auscultation] : lungs were clear to auscultation bilaterally [Normal Rate] : normal rate  [Regular Rhythm] : with a regular rhythm [Normal S1, S2] : normal S1 and S2 [No Murmur] : no murmur heard [No Carotid Bruits] : no carotid bruits [No Abdominal Bruit] : a ~M bruit was not heard ~T in the abdomen [Pedal Pulses Present] : the pedal pulses are present [No Edema] : there was no peripheral edema [No Palpable Aorta] : no palpable aorta [Soft] : abdomen soft [Non Tender] : non-tender [Non-distended] : non-distended [No Masses] : no abdominal mass palpated [No HSM] : no HSM [Normal Bowel Sounds] : normal bowel sounds [Normal Posterior Cervical Nodes] : no posterior cervical lymphadenopathy [Normal Anterior Cervical Nodes] : no anterior cervical lymphadenopathy [No CVA Tenderness] : no CVA  tenderness [No Spinal Tenderness] : no spinal tenderness [No Joint Swelling] : no joint swelling [Grossly Normal Strength/Tone] : grossly normal strength/tone [Coordination Grossly Intact] : coordination grossly intact [No Focal Deficits] : no focal deficits [Normal Gait] : normal gait [Speech Grossly Normal] : speech grossly normal [Memory Grossly Normal] : memory grossly normal [Normal Affect] : the affect was normal [Alert and Oriented x3] : oriented to person, place, and time [Normal Mood] : the mood was normal [Normal Insight/Judgement] : insight and judgment were intact [de-identified] : obese

## 2024-08-08 NOTE — HEALTH RISK ASSESSMENT
[Yes] : Yes [Monthly or less (1 pt)] : Monthly or less (1 point) [1 or 2 (0 pts)] : 1 or 2 (0 points) [Never (0 pts)] : Never (0 points) [No] : In the past 12 months have you used drugs other than those required for medical reasons? No [0] : 2) Feeling down, depressed, or hopeless: Not at all (0) [PHQ-2 Negative - No further assessment needed] : PHQ-2 Negative - No further assessment needed [Former] : Former [< 15 Years] : < 15 Years [15-19] : 15-19 [Audit-CScore] : 1 [[id number]] : 0 [de-identified] : 2022 - ~ 15 pack years - 1/2 ppd for ~ 30 years

## 2024-08-08 NOTE — REVIEW OF SYSTEMS
[Back Pain] : back pain [Insomnia] : insomnia [Anxiety] : anxiety [Depression] : depression [Negative] : Heme/Lymph [Fatigue] : fatigue [Suicidal] : not suicidal

## 2024-08-20 ENCOUNTER — NON-APPOINTMENT (OUTPATIENT)
Age: 49
End: 2024-08-20

## 2024-08-23 ENCOUNTER — APPOINTMENT (OUTPATIENT)
Dept: GASTROENTEROLOGY | Facility: AMBULATORY MEDICAL SERVICES | Age: 49
End: 2024-08-23

## 2024-09-18 ENCOUNTER — APPOINTMENT (OUTPATIENT)
Dept: GASTROENTEROLOGY | Facility: AMBULATORY MEDICAL SERVICES | Age: 49
End: 2024-09-18

## 2024-11-06 ENCOUNTER — APPOINTMENT (OUTPATIENT)
Dept: INTERNAL MEDICINE | Facility: CLINIC | Age: 49
End: 2024-11-06

## 2024-11-06 ENCOUNTER — TRANSCRIPTION ENCOUNTER (OUTPATIENT)
Age: 49
End: 2024-11-06

## 2024-11-06 VITALS
WEIGHT: 184 LBS | HEIGHT: 67 IN | DIASTOLIC BLOOD PRESSURE: 78 MMHG | HEART RATE: 77 BPM | SYSTOLIC BLOOD PRESSURE: 118 MMHG | OXYGEN SATURATION: 99 % | RESPIRATION RATE: 15 BRPM | BODY MASS INDEX: 28.88 KG/M2

## 2024-11-06 DIAGNOSIS — R73.09 OTHER ABNORMAL GLUCOSE: ICD-10-CM

## 2024-11-06 DIAGNOSIS — Z23 ENCOUNTER FOR IMMUNIZATION: ICD-10-CM

## 2024-11-06 DIAGNOSIS — F41.9 ANXIETY DISORDER, UNSPECIFIED: ICD-10-CM

## 2024-11-06 DIAGNOSIS — I10 ESSENTIAL (PRIMARY) HYPERTENSION: ICD-10-CM

## 2024-11-06 DIAGNOSIS — C43.59 MALIGNANT MELANOMA OF OTHER PART OF TRUNK: ICD-10-CM

## 2024-11-06 DIAGNOSIS — Z95.5 ATHEROSCLEROTIC HEART DISEASE OF NATIVE CORONARY ARTERY W/OUT ANGINA PECTORIS: ICD-10-CM

## 2024-11-06 DIAGNOSIS — Z83.719 FAMILY HISTORY OF COLON POLYPS, UNSPECIFIED: ICD-10-CM

## 2024-11-06 DIAGNOSIS — G47.00 INSOMNIA, UNSPECIFIED: ICD-10-CM

## 2024-11-06 DIAGNOSIS — Z95.5 PRESENCE OF CORONARY ANGIOPLASTY IMPLANT AND GRAFT: ICD-10-CM

## 2024-11-06 DIAGNOSIS — E66.9 OBESITY, UNSPECIFIED: ICD-10-CM

## 2024-11-06 DIAGNOSIS — I25.10 ATHEROSCLEROTIC HEART DISEASE OF NATIVE CORONARY ARTERY W/OUT ANGINA PECTORIS: ICD-10-CM

## 2024-11-06 DIAGNOSIS — I25.85 CHRONIC CORONARY MICROVASCULAR DYSFUNCTION: ICD-10-CM

## 2024-11-06 DIAGNOSIS — I50.22 CHRONIC SYSTOLIC (CONGESTIVE) HEART FAILURE: ICD-10-CM

## 2024-11-06 DIAGNOSIS — R59.0 LOCALIZED ENLARGED LYMPH NODES: ICD-10-CM

## 2024-11-06 DIAGNOSIS — I25.2 OLD MYOCARDIAL INFARCTION: ICD-10-CM

## 2024-11-06 DIAGNOSIS — N83.209 UNSPECIFIED OVARIAN CYST, UNSPECIFIED SIDE: ICD-10-CM

## 2024-11-06 DIAGNOSIS — E78.5 HYPERLIPIDEMIA, UNSPECIFIED: ICD-10-CM

## 2024-11-06 DIAGNOSIS — M17.10 UNILATERAL PRIMARY OSTEOARTHRITIS, UNSPECIFIED KNEE: ICD-10-CM

## 2024-11-06 PROCEDURE — 90656 IIV3 VACC NO PRSV 0.5 ML IM: CPT

## 2024-11-06 PROCEDURE — 99214 OFFICE O/P EST MOD 30 MIN: CPT | Mod: 25

## 2024-11-06 PROCEDURE — G0008: CPT

## 2024-11-07 LAB
ALBUMIN SERPL ELPH-MCNC: 4.4 G/DL
ALP BLD-CCNC: 74 U/L
ALT SERPL-CCNC: 15 U/L
ANION GAP SERPL CALC-SCNC: 12 MMOL/L
AST SERPL-CCNC: 12 U/L
BASOPHILS # BLD AUTO: 0.09 K/UL
BASOPHILS NFR BLD AUTO: 1.5 %
BILIRUB SERPL-MCNC: 0.4 MG/DL
BUN SERPL-MCNC: 10 MG/DL
CALCIUM SERPL-MCNC: 9.6 MG/DL
CHLORIDE SERPL-SCNC: 104 MMOL/L
CHOLEST SERPL-MCNC: 129 MG/DL
CO2 SERPL-SCNC: 28 MMOL/L
CREAT SERPL-MCNC: 0.59 MG/DL
EGFR: 109 ML/MIN/1.73M2
EOSINOPHIL # BLD AUTO: 0.16 K/UL
EOSINOPHIL NFR BLD AUTO: 2.7 %
ESTIMATED AVERAGE GLUCOSE: 97 MG/DL
GLUCOSE SERPL-MCNC: 85 MG/DL
HBA1C MFR BLD HPLC: 5 %
HCT VFR BLD CALC: 44.8 %
HDLC SERPL-MCNC: 60 MG/DL
HGB BLD-MCNC: 13.9 G/DL
IMM GRANULOCYTES NFR BLD AUTO: 0.2 %
LDLC SERPL CALC-MCNC: 59 MG/DL
LYMPHOCYTES # BLD AUTO: 2.08 K/UL
LYMPHOCYTES NFR BLD AUTO: 34.7 %
MAGNESIUM SERPL-MCNC: 2.5 MG/DL
MAN DIFF?: NORMAL
MCHC RBC-ENTMCNC: 27.8 PG
MCHC RBC-ENTMCNC: 31 G/DL
MCV RBC AUTO: 89.6 FL
MONOCYTES # BLD AUTO: 0.4 K/UL
MONOCYTES NFR BLD AUTO: 6.7 %
NEUTROPHILS # BLD AUTO: 3.25 K/UL
NEUTROPHILS NFR BLD AUTO: 54.2 %
NONHDLC SERPL-MCNC: 70 MG/DL
PLATELET # BLD AUTO: 278 K/UL
POTASSIUM SERPL-SCNC: 4.9 MMOL/L
PROT SERPL-MCNC: 6.8 G/DL
RBC # BLD: 5 M/UL
RBC # FLD: 14.9 %
SODIUM SERPL-SCNC: 144 MMOL/L
TRIGL SERPL-MCNC: 48 MG/DL
WBC # FLD AUTO: 5.99 K/UL

## 2024-11-14 ENCOUNTER — APPOINTMENT (OUTPATIENT)
Dept: DERMATOLOGY | Facility: CLINIC | Age: 49
End: 2024-11-14

## 2024-11-18 ENCOUNTER — NON-APPOINTMENT (OUTPATIENT)
Age: 49
End: 2024-11-18

## 2024-11-18 ENCOUNTER — EMERGENCY (EMERGENCY)
Facility: HOSPITAL | Age: 49
LOS: 1 days | Discharge: HOME | End: 2024-11-18
Attending: EMERGENCY MEDICINE
Payer: MEDICAID

## 2024-11-18 VITALS
HEART RATE: 83 BPM | HEIGHT: 67 IN | SYSTOLIC BLOOD PRESSURE: 137 MMHG | WEIGHT: 184.09 LBS | DIASTOLIC BLOOD PRESSURE: 94 MMHG | OXYGEN SATURATION: 99 % | TEMPERATURE: 98 F | RESPIRATION RATE: 16 BRPM

## 2024-11-18 VITALS
DIASTOLIC BLOOD PRESSURE: 79 MMHG | RESPIRATION RATE: 17 BRPM | TEMPERATURE: 98 F | HEART RATE: 79 BPM | SYSTOLIC BLOOD PRESSURE: 128 MMHG | OXYGEN SATURATION: 100 %

## 2024-11-18 DIAGNOSIS — Z98.890 OTHER SPECIFIED POSTPROCEDURAL STATES: Chronic | ICD-10-CM

## 2024-11-18 DIAGNOSIS — D27.1 BENIGN NEOPLASM OF LEFT OVARY: Chronic | ICD-10-CM

## 2024-11-18 LAB
ALBUMIN SERPL ELPH-MCNC: 4.5 G/DL — SIGNIFICANT CHANGE UP (ref 3.3–5)
ALP SERPL-CCNC: 72 U/L — SIGNIFICANT CHANGE UP (ref 40–120)
ALT FLD-CCNC: 17 U/L — SIGNIFICANT CHANGE UP (ref 10–45)
ANION GAP SERPL CALC-SCNC: 13 MMOL/L — SIGNIFICANT CHANGE UP (ref 5–17)
AST SERPL-CCNC: 15 U/L — SIGNIFICANT CHANGE UP (ref 10–40)
BASOPHILS # BLD AUTO: 0.07 K/UL — SIGNIFICANT CHANGE UP (ref 0–0.2)
BASOPHILS NFR BLD AUTO: 0.9 % — SIGNIFICANT CHANGE UP (ref 0–2)
BILIRUB SERPL-MCNC: 0.4 MG/DL — SIGNIFICANT CHANGE UP (ref 0.2–1.2)
BUN SERPL-MCNC: 14 MG/DL — SIGNIFICANT CHANGE UP (ref 7–23)
CALCIUM SERPL-MCNC: 9.3 MG/DL — SIGNIFICANT CHANGE UP (ref 8.4–10.5)
CHLORIDE SERPL-SCNC: 103 MMOL/L — SIGNIFICANT CHANGE UP (ref 96–108)
CO2 SERPL-SCNC: 26 MMOL/L — SIGNIFICANT CHANGE UP (ref 22–31)
CREAT SERPL-MCNC: 0.6 MG/DL — SIGNIFICANT CHANGE UP (ref 0.5–1.3)
EGFR: 109 ML/MIN/1.73M2 — SIGNIFICANT CHANGE UP
EOSINOPHIL # BLD AUTO: 0.07 K/UL — SIGNIFICANT CHANGE UP (ref 0–0.5)
EOSINOPHIL NFR BLD AUTO: 0.9 % — SIGNIFICANT CHANGE UP (ref 0–6)
GIANT PLATELETS BLD QL SMEAR: PRESENT — SIGNIFICANT CHANGE UP
GLUCOSE SERPL-MCNC: 95 MG/DL — SIGNIFICANT CHANGE UP (ref 70–99)
HCT VFR BLD CALC: 45.3 % — HIGH (ref 34.5–45)
HGB BLD-MCNC: 13.9 G/DL — SIGNIFICANT CHANGE UP (ref 11.5–15.5)
LIDOCAIN IGE QN: 42 U/L — SIGNIFICANT CHANGE UP (ref 7–60)
LYMPHOCYTES # BLD AUTO: 2.81 K/UL — SIGNIFICANT CHANGE UP (ref 1–3.3)
LYMPHOCYTES # BLD AUTO: 34.8 % — SIGNIFICANT CHANGE UP (ref 13–44)
MANUAL SMEAR VERIFICATION: SIGNIFICANT CHANGE UP
MCHC RBC-ENTMCNC: 26.8 PG — LOW (ref 27–34)
MCHC RBC-ENTMCNC: 30.7 G/DL — LOW (ref 32–36)
MCV RBC AUTO: 87.3 FL — SIGNIFICANT CHANGE UP (ref 80–100)
MONOCYTES # BLD AUTO: 0.44 K/UL — SIGNIFICANT CHANGE UP (ref 0–0.9)
MONOCYTES NFR BLD AUTO: 5.4 % — SIGNIFICANT CHANGE UP (ref 2–14)
NEUTROPHILS # BLD AUTO: 4.68 K/UL — SIGNIFICANT CHANGE UP (ref 1.8–7.4)
NEUTROPHILS NFR BLD AUTO: 57.1 % — SIGNIFICANT CHANGE UP (ref 43–77)
NEUTS BAND # BLD: 0.9 % — SIGNIFICANT CHANGE UP (ref 0–8)
NT-PROBNP SERPL-SCNC: <36 PG/ML — SIGNIFICANT CHANGE UP (ref 0–300)
PLAT MORPH BLD: ABNORMAL
PLATELET # BLD AUTO: 275 K/UL — SIGNIFICANT CHANGE UP (ref 150–400)
POTASSIUM SERPL-MCNC: 3.7 MMOL/L — SIGNIFICANT CHANGE UP (ref 3.5–5.3)
POTASSIUM SERPL-SCNC: 3.7 MMOL/L — SIGNIFICANT CHANGE UP (ref 3.5–5.3)
PROT SERPL-MCNC: 7.4 G/DL — SIGNIFICANT CHANGE UP (ref 6–8.3)
RBC # BLD: 5.19 M/UL — SIGNIFICANT CHANGE UP (ref 3.8–5.2)
RBC # FLD: 13.8 % — SIGNIFICANT CHANGE UP (ref 10.3–14.5)
RBC BLD AUTO: NORMAL — SIGNIFICANT CHANGE UP
SODIUM SERPL-SCNC: 142 MMOL/L — SIGNIFICANT CHANGE UP (ref 135–145)
TROPONIN T, HIGH SENSITIVITY RESULT: <6 NG/L — SIGNIFICANT CHANGE UP (ref 0–51)
WBC # BLD: 8.07 K/UL — SIGNIFICANT CHANGE UP (ref 3.8–10.5)
WBC # FLD AUTO: 8.07 K/UL — SIGNIFICANT CHANGE UP (ref 3.8–10.5)

## 2024-11-18 PROCEDURE — 85025 COMPLETE CBC W/AUTO DIFF WBC: CPT

## 2024-11-18 PROCEDURE — 71046 X-RAY EXAM CHEST 2 VIEWS: CPT

## 2024-11-18 PROCEDURE — 84484 ASSAY OF TROPONIN QUANT: CPT

## 2024-11-18 PROCEDURE — 99285 EMERGENCY DEPT VISIT HI MDM: CPT

## 2024-11-18 PROCEDURE — 96374 THER/PROPH/DIAG INJ IV PUSH: CPT

## 2024-11-18 PROCEDURE — 83880 ASSAY OF NATRIURETIC PEPTIDE: CPT

## 2024-11-18 PROCEDURE — 99285 EMERGENCY DEPT VISIT HI MDM: CPT | Mod: 25

## 2024-11-18 PROCEDURE — 93005 ELECTROCARDIOGRAM TRACING: CPT

## 2024-11-18 PROCEDURE — 80053 COMPREHEN METABOLIC PANEL: CPT

## 2024-11-18 PROCEDURE — 71046 X-RAY EXAM CHEST 2 VIEWS: CPT | Mod: 26

## 2024-11-18 PROCEDURE — 83690 ASSAY OF LIPASE: CPT

## 2024-11-18 RX ORDER — FAMOTIDINE 10 MG/ML
20 INJECTION INTRAVENOUS ONCE
Refills: 0 | Status: COMPLETED | OUTPATIENT
Start: 2024-11-18 | End: 2024-11-18

## 2024-11-18 RX ORDER — ASPIRIN/MAG CARB/ALUMINUM AMIN 325 MG
162 TABLET ORAL ONCE
Refills: 0 | Status: COMPLETED | OUTPATIENT
Start: 2024-11-18 | End: 2024-11-18

## 2024-11-18 RX ORDER — LIDOCAINE HYDROCHLORIDE 40 MG/ML
1 SOLUTION TOPICAL ONCE
Refills: 0 | Status: COMPLETED | OUTPATIENT
Start: 2024-11-18 | End: 2024-11-18

## 2024-11-18 RX ADMIN — Medication 162 MILLIGRAM(S): at 18:55

## 2024-11-18 RX ADMIN — FAMOTIDINE 20 MILLIGRAM(S): 10 INJECTION INTRAVENOUS at 21:13

## 2024-11-18 RX ADMIN — LIDOCAINE HYDROCHLORIDE 1 PATCH: 40 SOLUTION TOPICAL at 21:13

## 2024-11-18 NOTE — ED PROVIDER NOTE - PHYSICAL EXAMINATION
General: alert, oriented to person, time, place  Psych: mood appropriate  Head: normocephalic; atraumatic  Eyes: conjunctivae clear bilaterally, sclerae anicteric  ENT: no nasal flaring, patent nares  Cardio: RRR, no m/r/g, pulses 2+ b/l  Resp: CATB, no w/r/r  GI: soft/nondistended/nontender  Neuro: normal sensation, moving all four extremities equally  Skin: No evidence of rash or bruising  MSK: no reproducible chest pain on exam  Lymph/Vasc: no LE edema

## 2024-11-18 NOTE — ED PROVIDER NOTE - CLINICAL SUMMARY MEDICAL DECISION MAKING FREE TEXT BOX
The patient's risk factors for ACS were reviewed as well as the initial EKG.  A chest x-ray has been ordered to exclude pneumonia, pneumothorax, or esophageal tears.  The patient does not require CT angiogram to rule out a pulmonary embolism based on the Wells Score and/or PERC rule. There are no concerning features of history of exam that are strongly suggestive of pericarditis, endocarditis, or myocarditis. There is no fever.  There does not appear to be an aortic dissection either based on history or physical exam. Will obtain serial EKG's as indicated for evolving symptoms, an initial troponin, maintain on cardiac monitor, reassess.    Initial workup noncontributory, no evidence of current myocardial ischemia. I attempted to contact patient's cardiologist who was unavailable. Lidocaine patch and famotidine ordered, will reassess.

## 2024-11-18 NOTE — ED PROVIDER NOTE - RAPID ASSESSMENT
[id number]  49-year-old female history of coronary artery disease with stent placement presenting to the emergency department with 10 days of left-sided chest discomfort.  Patient initially thought that it was musculoskeletal related to lifting while at work.  Patient became more concerned today when she started to get the symptoms while at rest.  No palpitations no shortness of breath pain is worse with movement and lifting however patient also experiencing the pain without doing those things.  No cough no fevers no chills  Patient was rapidly assessed via a telemedicine and/or role of Quick Triage Doctor; a limited history, physical exam and assessment was performed. The patient will be seen and further evaluated in the main emergency department. The remainder of care and evaluation will be conducted by the primary emergency medicine team. Receiving team will follow up on labs, imaging and serially reassess patient as indicated. All further decisions regarding patient care, evaluation and disposition are at the discretion of the receiving primary emergency department team. [id number]  49-year-old female history of coronary artery disease with stent placement presenting to the emergency department with 10 days of left-sided chest discomfort.  Patient initially thought that it was musculoskeletal related to lifting while at work.  Patient became more concerned today when she started to get the symptoms while at rest.  No palpitations no shortness of breath pain is worse with movement and lifting however patient also experiencing the pain without doing those things.  No cough no fevers no chills  Patient was rapidly assessed via a telemedicine and/or role of Quick Triage Doctor; a limited history, physical exam and assessment was performed. The patient will be seen and further evaluated in the main emergency department. The remainder of care and evaluation will be conducted by the primary emergency medicine team. Receiving team will follow up on labs, imaging and serially reassess patient as indicated. All further decisions regarding patient care, evaluation and disposition are at the discretion of the receiving primary emergency department team.    Attending - Marcelino Salgado MD, cosign the chart.  I was available for supervisory role but did not directly see, perform a history, or examine the patient.  The patient was to be seen and further worked up in the main emergency department and their care was to be completed by the main emergency department team along with a physical exam. Receiving team will follow up on labs, analgesia, any clinical imaging, reassess and disposition as clinically indicated, all decisions regarding the progression of care will be made at their discretion.

## 2024-11-18 NOTE — ED ADULT TRIAGE NOTE - CHIEF COMPLAINT QUOTE
Chest pain x 1 week ago with radiation from midsternal area, with radiation to L side.   PMH MI with x 2 stents in 2020, x 1 stent in 2021.

## 2024-11-18 NOTE — ED PROVIDER NOTE - NSFOLLOWUPINSTRUCTIONS_ED_ALL_ED_FT
You were seen in the emergency department for chest pain. We have evaluated you and determined that you do not require further hospital interventions.    During your stay you had the following relevant results: an EKG and troponin (blood test) that do not show evidence of a heart attack, a chest X-ray that does not show evidence of a lung infection    Please follow up with your CARDIOLOGIST to discuss the results of your stay in our department.    If you start to experience worsening symptoms such as persistent chest pain, nausea or vomiting, difficulty breathing, please return to the emergency department for further evaluation. You were seen in the emergency department for chest pain. We have evaluated you and determined that you do not require further hospital interventions.    During your stay you had the following relevant results: an EKG and troponin (blood test) that do not show evidence of a heart attack, a chest X-ray that does not show evidence of a lung infection    Please follow up with your CARDIOLOGIST, Dr. Roman, to discuss the results of your stay in our department.    If you start to experience worsening symptoms such as persistent chest pain, nausea or vomiting, difficulty breathing, please return to the emergency department for further evaluation.

## 2024-11-18 NOTE — ED ADULT NURSE NOTE - OBJECTIVE STATEMENT
49-year-old female history of coronary artery disease with stent placement presenting to the emergency department with 10 days of left-sided chest discomfort.  Patient initially thought that it was musculoskeletal related to lifting while at work.  Patient became more concerned today when she started to get the symptoms while at rest.  No palpitations no shortness of breath pain is worse with movement and lifting however patient also experiencing the pain without doing those things.  No cough no fevers no chills

## 2024-11-18 NOTE — ED PROVIDER NOTE - ATTENDING CONTRIBUTION TO CARE
49-year-old female with past medical history of hypertension, dyslipidemia, coronary artery disease w MI 2020 and 2 stents and then addtl stent in 2023 presents with initially intermittent lower substernal and left-sided chest pain for the past 10 days. Sx usually self resolve, worse when lying on side. This AM sx onset 0400 and persisted for much of day. Reports does not feel similar to prior MI and does not feel like GERD, She denies palpitations, shortness of breath, [place], syncope, fevers, chills, nausea or vomiting, leg swelling. Last stress test was August 2024 and was "normal." Unable to view results in HIE. However, can see cards visit and stress echo from May 2024. VS non actionable. EKG unchanged from prior. Pt is well appearing NAD, nml heart and lung sounds, lower sternal TTP, no bony rib ttp, no crepitus, no rashes. No LE ttp or swelling, GIven hx concern for ACS vs MSK vs GI. Per chart hx has had similar sounding sx in past and attributed to MSK cause. Will check labs, monitor on tele. check CXR, attempt to reach out to her cards. Took ASA PTA.

## 2024-11-18 NOTE — ED ADULT NURSE NOTE - NSFALLUNIVINTERV_ED_ALL_ED
Bed/Stretcher in lowest position, wheels locked, appropriate side rails in place/Call bell, personal items and telephone in reach/Instruct patient to call for assistance before getting out of bed/chair/stretcher/Non-slip footwear applied when patient is off stretcher/[place] to call system/Physically safe environment - no spills, clutter or unnecessary equipment/Purposeful proactive rounding/Room/bathroom lighting operational, light cord in reach

## 2024-11-18 NOTE — ED PROVIDER NOTE - OBJECTIVE STATEMENT
49-year-old female with past medical history of hypertension, dyslipidemia, coronary artery disease with last stent placed a year and a half ago presents to the emergency department with initially intermittent left-sided chest pain for the past 10 days mostly when she was at work as a .  She thought that it was musculoskeletal, however became concerned today when she had symptoms at rest.  She denies palpitations, shortness of breath, fevers, chills, nausea or vomiting.  She recently switched from Dr. Giang to Dr. Roman, states her last stress test was several months ago and was "normal."

## 2024-11-18 NOTE — ED PROVIDER NOTE - PROGRESS NOTE DETAILS
Pt feels improved. Trop < 6. Attempted to reach out to Dr Roman office. Unable to make contact. Last stress and echo were August 2024. She reports testing was within normal limits. GIven no EKG changes from prior, trop < 6 and improved pain with ability for close OP FU (says she can FU w Dr Roman tomorrow), plan to DC w strict return precautions.

## 2024-11-18 NOTE — ED PROVIDER NOTE - PATIENT PORTAL LINK FT
You can access the FollowMyHealth Patient Portal offered by [facility] by registering at the following website: [email or web address]. By joining [facility]’s FollowMyHealth portal, you will also be able to view your health information using other applications (apps) compatible with our system.

## 2024-11-21 ENCOUNTER — APPOINTMENT (OUTPATIENT)
Dept: DERMATOLOGY | Facility: CLINIC | Age: 49
End: 2024-11-21
Payer: MEDICAID

## 2024-11-21 DIAGNOSIS — L91.0 HYPERTROPHIC SCAR: ICD-10-CM

## 2024-11-21 DIAGNOSIS — L90.5 SCAR CONDITIONS AND FIBROSIS OF SKIN: ICD-10-CM

## 2024-11-21 PROCEDURE — 99213 OFFICE O/P EST LOW 20 MIN: CPT

## 2024-11-22 ENCOUNTER — APPOINTMENT (OUTPATIENT)
Dept: INTERNAL MEDICINE | Facility: CLINIC | Age: 49
End: 2024-11-22
Payer: MEDICAID

## 2024-11-22 VITALS
HEART RATE: 76 BPM | RESPIRATION RATE: 16 BRPM | TEMPERATURE: 98.6 F | DIASTOLIC BLOOD PRESSURE: 80 MMHG | OXYGEN SATURATION: 98 % | SYSTOLIC BLOOD PRESSURE: 124 MMHG | HEIGHT: 67 IN | BODY MASS INDEX: 28.88 KG/M2 | WEIGHT: 184 LBS

## 2024-11-22 DIAGNOSIS — M94.0 CHONDROCOSTAL JUNCTION SYNDROME [TIETZE]: ICD-10-CM

## 2024-11-22 DIAGNOSIS — I25.10 ATHEROSCLEROTIC HEART DISEASE OF NATIVE CORONARY ARTERY W/OUT ANGINA PECTORIS: ICD-10-CM

## 2024-11-22 DIAGNOSIS — I25.2 OLD MYOCARDIAL INFARCTION: ICD-10-CM

## 2024-11-22 DIAGNOSIS — R07.9 CHEST PAIN, UNSPECIFIED: ICD-10-CM

## 2024-11-22 DIAGNOSIS — I50.22 CHRONIC SYSTOLIC (CONGESTIVE) HEART FAILURE: ICD-10-CM

## 2024-11-22 DIAGNOSIS — I25.85 CHRONIC CORONARY MICROVASCULAR DYSFUNCTION: ICD-10-CM

## 2024-11-22 PROCEDURE — 99213 OFFICE O/P EST LOW 20 MIN: CPT

## 2024-11-22 PROCEDURE — G2211 COMPLEX E/M VISIT ADD ON: CPT | Mod: NC

## 2024-12-11 ENCOUNTER — APPOINTMENT (OUTPATIENT)
Dept: DERMATOLOGY | Facility: CLINIC | Age: 49
End: 2024-12-11
Payer: MEDICAID

## 2024-12-11 DIAGNOSIS — D48.5 NEOPLASM OF UNCERTAIN BEHAVIOR OF SKIN: ICD-10-CM

## 2024-12-11 DIAGNOSIS — Z12.83 ENCOUNTER FOR SCREENING FOR MALIGNANT NEOPLASM OF SKIN: ICD-10-CM

## 2024-12-11 DIAGNOSIS — D22.9 MELANOCYTIC NEVI, UNSPECIFIED: ICD-10-CM

## 2024-12-11 DIAGNOSIS — L81.4 OTHER MELANIN HYPERPIGMENTATION: ICD-10-CM

## 2024-12-11 PROCEDURE — 99213 OFFICE O/P EST LOW 20 MIN: CPT | Mod: 25

## 2024-12-11 PROCEDURE — 11102 TANGNTL BX SKIN SINGLE LES: CPT

## 2024-12-12 ENCOUNTER — NON-APPOINTMENT (OUTPATIENT)
Age: 49
End: 2024-12-12

## 2024-12-17 LAB — DERMATOLOGY BIOPSY: NORMAL

## 2024-12-18 ENCOUNTER — INPATIENT (INPATIENT)
Facility: HOSPITAL | Age: 49
LOS: 0 days | Discharge: HOME | DRG: 313 | End: 2024-12-19
Attending: STUDENT IN AN ORGANIZED HEALTH CARE EDUCATION/TRAINING PROGRAM | Admitting: STUDENT IN AN ORGANIZED HEALTH CARE EDUCATION/TRAINING PROGRAM
Payer: MEDICAID

## 2024-12-18 ENCOUNTER — RX CHANGE (OUTPATIENT)
Age: 49
End: 2024-12-18

## 2024-12-18 VITALS
HEIGHT: 67 IN | DIASTOLIC BLOOD PRESSURE: 86 MMHG | TEMPERATURE: 98 F | WEIGHT: 184.97 LBS | SYSTOLIC BLOOD PRESSURE: 125 MMHG | RESPIRATION RATE: 20 BRPM | HEART RATE: 86 BPM | OXYGEN SATURATION: 97 %

## 2024-12-18 DIAGNOSIS — Z98.890 OTHER SPECIFIED POSTPROCEDURAL STATES: Chronic | ICD-10-CM

## 2024-12-18 DIAGNOSIS — D27.1 BENIGN NEOPLASM OF LEFT OVARY: Chronic | ICD-10-CM

## 2024-12-18 DIAGNOSIS — I10 ESSENTIAL (PRIMARY) HYPERTENSION: ICD-10-CM

## 2024-12-18 DIAGNOSIS — R07.9 CHEST PAIN, UNSPECIFIED: ICD-10-CM

## 2024-12-18 DIAGNOSIS — R07.89 OTHER CHEST PAIN: ICD-10-CM

## 2024-12-18 DIAGNOSIS — E78.5 HYPERLIPIDEMIA, UNSPECIFIED: ICD-10-CM

## 2024-12-18 DIAGNOSIS — Z29.9 ENCOUNTER FOR PROPHYLACTIC MEASURES, UNSPECIFIED: ICD-10-CM

## 2024-12-18 DIAGNOSIS — I25.10 ATHEROSCLEROTIC HEART DISEASE OF NATIVE CORONARY ARTERY WITHOUT ANGINA PECTORIS: ICD-10-CM

## 2024-12-18 LAB
ALBUMIN SERPL ELPH-MCNC: 4 G/DL — SIGNIFICANT CHANGE UP (ref 3.3–5)
ALP SERPL-CCNC: 84 U/L — SIGNIFICANT CHANGE UP (ref 40–120)
ALT FLD-CCNC: 14 U/L — SIGNIFICANT CHANGE UP (ref 10–45)
ANION GAP SERPL CALC-SCNC: 11 MMOL/L — SIGNIFICANT CHANGE UP (ref 5–17)
AST SERPL-CCNC: 13 U/L — SIGNIFICANT CHANGE UP (ref 10–40)
BASOPHILS # BLD AUTO: 0.04 K/UL — SIGNIFICANT CHANGE UP (ref 0–0.2)
BASOPHILS NFR BLD AUTO: 0.5 % — SIGNIFICANT CHANGE UP (ref 0–2)
BILIRUB SERPL-MCNC: 0.6 MG/DL — SIGNIFICANT CHANGE UP (ref 0.2–1.2)
BUN SERPL-MCNC: 10 MG/DL — SIGNIFICANT CHANGE UP (ref 7–23)
CALCIUM SERPL-MCNC: 9.2 MG/DL — SIGNIFICANT CHANGE UP (ref 8.4–10.5)
CHLORIDE SERPL-SCNC: 106 MMOL/L — SIGNIFICANT CHANGE UP (ref 96–108)
CO2 SERPL-SCNC: 26 MMOL/L — SIGNIFICANT CHANGE UP (ref 22–31)
CREAT SERPL-MCNC: 0.52 MG/DL — SIGNIFICANT CHANGE UP (ref 0.5–1.3)
EGFR: 112 ML/MIN/1.73M2 — SIGNIFICANT CHANGE UP
EOSINOPHIL # BLD AUTO: 1.07 K/UL — HIGH (ref 0–0.5)
EOSINOPHIL NFR BLD AUTO: 12.4 % — HIGH (ref 0–6)
GLUCOSE SERPL-MCNC: 108 MG/DL — HIGH (ref 70–99)
HCG SERPL-ACNC: <2 MIU/ML — SIGNIFICANT CHANGE UP
HCT VFR BLD CALC: 41.5 % — SIGNIFICANT CHANGE UP (ref 34.5–45)
HGB BLD-MCNC: 13.3 G/DL — SIGNIFICANT CHANGE UP (ref 11.5–15.5)
IMM GRANULOCYTES NFR BLD AUTO: 0.3 % — SIGNIFICANT CHANGE UP (ref 0–0.9)
LIDOCAIN IGE QN: 40 U/L — SIGNIFICANT CHANGE UP (ref 7–60)
LYMPHOCYTES # BLD AUTO: 1.33 K/UL — SIGNIFICANT CHANGE UP (ref 1–3.3)
LYMPHOCYTES # BLD AUTO: 15.4 % — SIGNIFICANT CHANGE UP (ref 13–44)
MAGNESIUM SERPL-MCNC: 2.1 MG/DL — SIGNIFICANT CHANGE UP (ref 1.6–2.6)
MCHC RBC-ENTMCNC: 28.1 PG — SIGNIFICANT CHANGE UP (ref 27–34)
MCHC RBC-ENTMCNC: 32 G/DL — SIGNIFICANT CHANGE UP (ref 32–36)
MCV RBC AUTO: 87.6 FL — SIGNIFICANT CHANGE UP (ref 80–100)
MONOCYTES # BLD AUTO: 0.58 K/UL — SIGNIFICANT CHANGE UP (ref 0–0.9)
MONOCYTES NFR BLD AUTO: 6.7 % — SIGNIFICANT CHANGE UP (ref 2–14)
NEUTROPHILS # BLD AUTO: 5.59 K/UL — SIGNIFICANT CHANGE UP (ref 1.8–7.4)
NEUTROPHILS NFR BLD AUTO: 64.7 % — SIGNIFICANT CHANGE UP (ref 43–77)
NRBC # BLD: 0 /100 WBCS — SIGNIFICANT CHANGE UP (ref 0–0)
NT-PROBNP SERPL-SCNC: 60 PG/ML — SIGNIFICANT CHANGE UP (ref 0–300)
PLATELET # BLD AUTO: 245 K/UL — SIGNIFICANT CHANGE UP (ref 150–400)
POTASSIUM SERPL-MCNC: 3.7 MMOL/L — SIGNIFICANT CHANGE UP (ref 3.5–5.3)
POTASSIUM SERPL-SCNC: 3.7 MMOL/L — SIGNIFICANT CHANGE UP (ref 3.5–5.3)
PROT SERPL-MCNC: 6.7 G/DL — SIGNIFICANT CHANGE UP (ref 6–8.3)
RBC # BLD: 4.74 M/UL — SIGNIFICANT CHANGE UP (ref 3.8–5.2)
RBC # FLD: 13.8 % — SIGNIFICANT CHANGE UP (ref 10.3–14.5)
SODIUM SERPL-SCNC: 143 MMOL/L — SIGNIFICANT CHANGE UP (ref 135–145)
TROPONIN T, HIGH SENSITIVITY RESULT: <6 NG/L — SIGNIFICANT CHANGE UP (ref 0–51)
WBC # BLD: 8.64 K/UL — SIGNIFICANT CHANGE UP (ref 3.8–10.5)
WBC # FLD AUTO: 8.64 K/UL — SIGNIFICANT CHANGE UP (ref 3.8–10.5)

## 2024-12-18 PROCEDURE — 71045 X-RAY EXAM CHEST 1 VIEW: CPT | Mod: 26

## 2024-12-18 PROCEDURE — 99223 1ST HOSP IP/OBS HIGH 75: CPT

## 2024-12-18 PROCEDURE — 93010 ELECTROCARDIOGRAM REPORT: CPT

## 2024-12-18 PROCEDURE — 99285 EMERGENCY DEPT VISIT HI MDM: CPT

## 2024-12-18 RX ORDER — AMLODIPINE BESYLATE 10 MG/1
5 TABLET ORAL DAILY
Refills: 0 | Status: DISCONTINUED | OUTPATIENT
Start: 2024-12-18 | End: 2024-12-19

## 2024-12-18 RX ORDER — ACETAMINOPHEN, DIPHENHYDRAMINE HCL, PHENYLEPHRINE HCL 325; 25; 5 MG/1; MG/1; MG/1
3 TABLET ORAL AT BEDTIME
Refills: 0 | Status: DISCONTINUED | OUTPATIENT
Start: 2024-12-18 | End: 2024-12-19

## 2024-12-18 RX ORDER — DIPHENHYDRAMINE HCL 25 MG
25 CAPSULE ORAL ONCE
Refills: 0 | Status: COMPLETED | OUTPATIENT
Start: 2024-12-18 | End: 2024-12-18

## 2024-12-18 RX ORDER — HYDROCORTISONE BUTYRATE 0.1 %
1 CREAM (GRAM) TOPICAL DAILY
Refills: 0 | Status: DISCONTINUED | OUTPATIENT
Start: 2024-12-18 | End: 2024-12-19

## 2024-12-18 RX ORDER — ACETAMINOPHEN 500MG 500 MG/1
650 TABLET, COATED ORAL EVERY 6 HOURS
Refills: 0 | Status: DISCONTINUED | OUTPATIENT
Start: 2024-12-18 | End: 2024-12-19

## 2024-12-18 RX ORDER — ONDANSETRON HYDROCHLORIDE 4 MG/1
4 TABLET, FILM COATED ORAL EVERY 8 HOURS
Refills: 0 | Status: DISCONTINUED | OUTPATIENT
Start: 2024-12-18 | End: 2024-12-19

## 2024-12-18 RX ORDER — LORAZEPAM 2 MG/1
0.5 TABLET ORAL
Refills: 0 | Status: DISCONTINUED | OUTPATIENT
Start: 2024-12-18 | End: 2024-12-19

## 2024-12-18 RX ORDER — LOSARTAN POTASSIUM 100 MG/1
100 TABLET, FILM COATED ORAL DAILY
Refills: 0 | Status: DISCONTINUED | OUTPATIENT
Start: 2024-12-18 | End: 2024-12-19

## 2024-12-18 RX ORDER — MAGNESIUM, ALUMINUM HYDROXIDE 200-225/5
30 SUSPENSION, ORAL (FINAL DOSE FORM) ORAL EVERY 4 HOURS
Refills: 0 | Status: DISCONTINUED | OUTPATIENT
Start: 2024-12-18 | End: 2024-12-19

## 2024-12-18 RX ORDER — DIPHENHYDRAMINE HCL 25 MG
25 CAPSULE ORAL AT BEDTIME
Refills: 0 | Status: DISCONTINUED | OUTPATIENT
Start: 2024-12-18 | End: 2024-12-19

## 2024-12-18 RX ORDER — LORAZEPAM 2 MG/1
0.5 TABLET ORAL ONCE
Refills: 0 | Status: DISCONTINUED | OUTPATIENT
Start: 2024-12-18 | End: 2024-12-18

## 2024-12-18 RX ADMIN — Medication 25 MILLIGRAM(S): at 21:11

## 2024-12-18 RX ADMIN — LORAZEPAM 0.5 MILLIGRAM(S): 2 TABLET ORAL at 19:01

## 2024-12-18 RX ADMIN — LOSARTAN POTASSIUM 100 MILLIGRAM(S): 100 TABLET, FILM COATED ORAL at 21:11

## 2024-12-18 RX ADMIN — Medication 1 APPLICATION(S): at 21:10

## 2024-12-18 RX ADMIN — Medication 25 MILLIGRAM(S): at 13:50

## 2024-12-18 NOTE — ED PROVIDER NOTE - ED STEMI HIDDEN
[FreeTextEntry1] : Location: neck \par Quality: dull ache, sharp, shooting\par Pain level: 4/10\par Duration: few weeks \par Frequency: constant pain \par Alleviating Factors:motrin when it gets bad at night \par Aggravating Factors: lying down, sitting up, standing, walking, turning head \par Conservative treatment tried: chiropractor but did not help, heat, ice \par Associated Symptoms: +headaches, no numbness, no radiation down arm\par Prior Studies: MRI\par \par cervical RFA with Dr Murphy\par \par  hide ICU

## 2024-12-18 NOTE — CONSULT NOTE ADULT - NS ATTEST RISK PROBLEM GEN_ALL_CORE FT
Review of case and exam with patient.  Discussion of risks/benefits/alternatives to procedure.    Review of outpatient and inpatient records including personal interpretation of prior catheterization films.    Discussing with outpatient cardiologist - Dr. Roman and inpatient team.    The consult today directly contributed to decision to move forward with cardiac catheterization procedure.

## 2024-12-18 NOTE — H&P ADULT - HISTORY OF PRESENT ILLNESS
49 year old woman with past medical history of anxiety and CAD s/p stent in 2020, HTN, HLD who presents for chest pain. Patient describes feeling intermittent pain in her back. The sensation is likened to what she felt when she got her stents. The pain is described as a intermittent burning sensation. She spoke to her cardiologist Dr. Roman who referred her to [facility] ED for possible cardiac cath. Per ED, Interventional cardiology is aware and planning for cath tomorrow. Of note, patient was recently treated for UTI with macrobid and had hives for the last few days. She is unsure if it is related to the antibiotic or to a new mushroom supplement that she is taking. She denies any difficulty swallowing, nausea/vomiting/dizziness. She took ASA this morning.

## 2024-12-18 NOTE — H&P ADULT - NSHPREVIEWOFSYSTEMS_GEN_ALL_CORE
Review of Systems:   CONSTITUTIONAL: No fever, weight loss  RESPIRATORY: No SOB. No cough, wheezing, chills or hemoptysis  CARDIOVASCULAR: (+) chest pain.   GASTROINTESTINAL: No abdominal or epigastric pain. No nausea, vomiting, or hematemesis; No diarrhea or constipation. No melena or hematochezia.  GENITOURINARY: No dysuria, frequency, hematuria, or incontinence  NEUROLOGICAL: No headaches, memory loss, loss of strength, numbness, or tremors  SKIN: No itching, burning, rashes, or lesions   LYMPH NODES: No enlarged glands  ENDOCRINE: No heat or cold intolerance; No hair loss  MUSCULOSKELETAL: No joint pain or swelling; No muscle, back pain  PSYCHIATRIC: No depression, anxiety, mood swings, or difficulty sleeping  HEME/LYMPH: No easy bruising, or bleeding gums.

## 2024-12-18 NOTE — CONSULT NOTE ADULT - SUBJECTIVE AND OBJECTIVE BOX
HISTORY OF PRESENT ILLNESS:   HPI:  49 year old woman with past medical history of anxiety and CAD s/p LAD PCI 2020/RCA PCI 2023, HTN, HLD who presents for chest pain. 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  She presented to the ED a few weeks ago for the same.  Today, she reports waking up with severe CP in the center of her chest radiating to the right and left chest walls, occuring at rest, no clear relation to exertion.  She states it is similar but not the same as the time of her prior PCIs.  The pain is described as a intermittent burning sensation. She spoke to her cardiologist Dr. Roman who referred her to [facility] ED.    PAST MEDICAL & SURGICAL HISTORY:  Hypertension  CAD (coronary artery disease)  Hyperlipidemia  Anxiety and depression  GERD (gastroesophageal reflux disease)  COVID-19  Benign ovarian tumor, left  and fibroid - partial hysterectomy  H/O meniscectomy of right knee  S/P cardiac cath  2 stents 12/2020    FAMILY HISTORY:  [X ] no pertinent family history    SOCIAL HISTORY:    [X] former smoker  [ ] Smoker  [ ] Alcohol    Allergies  Macrobid (Hives)  Wellbutrin (Swelling)  Intolerances    	    REVIEW OF SYSTEMS:  [X] A ten-point review of systems was otherwise negative except as noted.  [ ] Due to altered mental status/intubation, subjective information were not able to be obtained from the patient. History was obtained, to the extent possible, from review of the chart and collateral sources of information.    PHYSICAL EXAM:  T(C): 36.4 (12-18-24 @ 16:16), Max: 36.5 (12-18-24 @ 12:28)  HR: 82 (12-18-24 @ 16:16) (82 - 86)  BP: 128/88 (12-18-24 @ 16:16) (116/86 - 128/88)  RR: 16 (12-18-24 @ 16:16) (16 - 20)  SpO2: 100% (12-18-24 @ 16:16) (97% - 100%)  Wt(kg): --  I&O's Summary      General Appearance: well appearing, normal for age and gender. 	  Neck: normal JVP, no bruit.   Eyes: PERRLA, Extra Ocular muscles intact.   Cardiovascular: regular rate and rhythm S1 S2, No JVD, No murmurs, No edema  Respiratory: Lungs clear to auscultation	  Psychiatry: Alert and oriented x 3, Mood & affect appropriate  Gastrointestinal:  Soft, Non-tender  Skin/Integumen: No rashes, No ecchymoses, No cyanosis	  Neurologic: Non-focal  Musculoskeletal/ extremities: Normal range of motion, No clubbing, cyanosis or edema  Vascular: Peripheral pulses palpable 2+ bilaterally    LABS:	 	                          13.3   8.64  )-----------( 245      ( 18 Dec 2024 14:10 )             41.5     12-18    143  |  106  |  10  ----------------------------<  108[H]  3.7   |  26  |  0.52    Ca    9.2      18 Dec 2024 14:10  Mg     2.1     12-18    TPro  6.7  /  Alb  4.0  /  TBili  0.6  /  DBili  x   /  AST  13  /  ALT  14  /  AlkPhos  84  12-18            CARDIAC MARKERS:            TELEMETRY EVENTS: 	    ECG: sinus, no acute ischemic changes    Home Medications:  amLODIPine 10 mg oral tablet: 1 tab(s) orally once a day (18 Dec 2024 17:44)  aspirin 81 mg oral delayed release tablet: 1 tab(s) orally once a day (18 Dec 2024 17:38)  LORazepam 0.5 mg oral tablet: 1 tab(s) orally 2 times a day, As Needed (18 Dec 2024 17:38)  losartan 100 mg oral tablet: 1 tab(s) orally once a day (18 Dec 2024 17:38)  Multiple Vitamins oral tablet: 1 tab(s) orally once a day (18 Dec 2024 17:36)  Ozempic 2 mg/1.5 mL (1 mg dose) subcutaneous solution: 1 milligram(s) subcutaneous once a week (18 Dec 2024 17:38)  Praluent Pen 150 mg/mL subcutaneous solution: 150 milligram(s) subcutaneously every 2 weeks (18 Dec 2024 17:38)    MEDICATIONS  (STANDING):  amLODIPine   Tablet 5 milliGRAM(s) Oral daily  aspirin  chewable 81 milliGRAM(s) Oral daily  atorvastatin 40 milliGRAM(s) Oral at bedtime  LORazepam     Tablet 0.5 milliGRAM(s) Oral once  losartan 100 milliGRAM(s) Oral daily    MEDICATIONS  (PRN):  acetaminophen     Tablet .. 650 milliGRAM(s) Oral every 6 hours PRN Temp greater or equal to 38C (100.4F), Mild Pain (1 - 3)  aluminum hydroxide/magnesium hydroxide/simethicone Suspension 30 milliLiter(s) Oral every 4 hours PRN Dyspepsia  LORazepam     Tablet 0.5 milliGRAM(s) Oral two times a day PRN Anxiety  melatonin 3 milliGRAM(s) Oral at bedtime PRN Insomnia  ondansetron Injectable 4 milliGRAM(s) IV Push every 8 hours PRN Nausea and/or Vomiting

## 2024-12-18 NOTE — H&P ADULT - NSHPPHYSICALEXAM_GEN_ALL_CORE
Vital Signs Last 24 Hrs  T(C): 36.4 (18 Dec 2024 16:16), Max: 36.5 (18 Dec 2024 12:28)  T(F): 97.5 (18 Dec 2024 16:16), Max: 97.7 (18 Dec 2024 12:28)  HR: 82 (18 Dec 2024 16:16) (82 - 86)  BP: 128/88 (18 Dec 2024 16:16) (116/86 - 128/88)  BP(mean): --  RR: 16 (18 Dec 2024 16:16) (16 - 20)  SpO2: 100% (18 Dec 2024 16:16) (97% - 100%)    Parameters below as of 18 Dec 2024 16:16  Patient On (Oxygen Delivery Method): room air        CONSTITUTIONAL: NAD, well-developed, well-groomed  EYES: PERRLA; conjunctiva and sclera clear  ENMT: Moist oral mucosa, no pharyngeal injection or exudates; normal dentition  NECK: Supple, no palpable masses; no thyromegaly  RESPIRATORY: Normal respiratory effort; lungs are clear to auscultation bilaterally  CARDIOVASCULAR: Regular rate and rhythm, normal S1 and S2, no murmur/rub/gallop; No lower extremity edema; Peripheral pulses are 2+ bilaterally  ABDOMEN: Nontender to palpation, normoactive bowel sounds, no rebound/guarding; No hepatosplenomegaly  MUSCULOSKELETAL:  Normal gait; no clubbing or cyanosis of digits; no joint swelling or tenderness to palpation  PSYCH: A+O to person, place, and time; affect appropriate  NEUROLOGY: CN 2-12 are intact and symmetric; no gross sensory deficits   SKIN: No rashes; no palpable lesions

## 2024-12-18 NOTE — ED PROVIDER NOTE - PROGRESS NOTE DETAILS
Dr. Lo received a call from Cardiology advising that they are aware of patient and plan to Cath, interventionalist to see pt in ED  Tram Fraga PA-C Spoke to interventional cardiology from [facility] who will see pt later today. Plan for Cath tomorrow, per cards hospitalist is aware of patient   Tram Fraga PA-C

## 2024-12-18 NOTE — H&P ADULT - PROBLEM SELECTOR PLAN 1
EKG no ischemic changes  Troponin negative x2  Referred by cardiologist outpatient per Cath--> Per ED Cards aware and plan for cath in AM  Followup Lipid Panel, TSH, A1c  Followup formal cardiology recommendations  Continue Aspirin  Per patient, no longer taking Brilinta or Plavix  Continue high dose statin

## 2024-12-18 NOTE — CONSULT NOTE ADULT - ASSESSMENT
50 YO female with PMHx significant for CAD s/p PCI to LAD and RCA presents with chest pain.    #chest pain with concern for unstable angina  - patient with new chest pain reminiscent of when she needed PCI in the past.  currently CP free and HD stable  - EKG without STEMI  - troponin negative x 2  - continue ASA 81mg QD  - allergy to plavix, will use brilinta for DAPT as needed  - continue amlodipine 10mg QD  - lipid management with praluent  - plan for left heart catheterization with coronary angiography tomorrow    Case plan discussed with patient, inpatient and outpatient cardiology teams.    ________________  Rd Snyder MD  Interventional & Structural Cardiology

## 2024-12-18 NOTE — ED PROVIDER NOTE - ATTENDING APP SHARED VISIT CONTRIBUTION OF CARE
Middle aged woman with previous cardiac disease s/p stent p/w symptoms that she says are similar to her previous cardiac episode. She describes chest pain that radiates to her left and right chest wall and had back pain last night. Denies any shortness of breath or exertional symptoms. Overall well appearing. EKG with NSR and no ST changes. Discussed with her cardiologist Dr. Roman who stated he was hoping for a catheterization today. Will obtain labwork and admit.

## 2024-12-18 NOTE — H&P ADULT - ASSESSMENT
49 year old female with past medical history of CAD s/p 2 stents presents with CP. Plan for cardiac cath in AM.

## 2024-12-18 NOTE — ED PROVIDER NOTE - OBJECTIVE STATEMENT
49-year-old female with history of CAD s/p stent in 2020, anxiety presents to the ED for evaluation of chest pain.  Patient reports that approximately ago she began to have intermittent pains in her back which felt similar to when she needed her prior stent.  This morning had onset of similar pain in her chest as well described as an intermittent burning type sensation.  She spoke to her cardiologist who recommended she come to the emergency department for a cath.  Patient reports that she also has had hives for the last few days.  She was recently treated for UTI with Macrobid last dose taken last night and at the same time also started new mushroom supplement Ryze around the same time and is unsure which is causing her hives.  She denies difficulty swallowing, difficulty breathing, nausea/vomiting.  Patient took her home aspirin this morning

## 2024-12-18 NOTE — ED ADULT NURSE NOTE - OBJECTIVE STATEMENT
49 year old female PMH of HTN, MI in 2020, HLD, obesity on ozempic, UTI last week s/p 5 day course of Macrobid. Pt. developed hives throughout body/ BL UE/ Trunk/abdomen yesterday and woke up with chest pain today. Pt. also endorsing back pain. States back pain x 7 days, atraumatic. Chest pain is right and left upper chest, does not radiate down arm(s), not associated with exertion, no SOB, no diaphoresis. Pt. states she has also recently began drinking a new coffee and is unsure of allergy to abx vs mushroom coffee (ryze). Denies taking anything for pain or rash prior to arrival to ED. States chest pain is burning in nature. Son at bedside. NP and MD at bedside for assessment and discussion of plan of care. Patient undressed and placed into gown, call bell in hand and side rails up with bed in lowest position for safety. blanket provided. Comfort and safety provided. 49 year old female PMH of HTN, MI in 2020, HLD, obesity on ozempic, anxiety on PRN lorazepam. Patient had a  UTI last week s/p 5 day course of Macrobid. Pt. developed hives throughout body/ BL UE/ Trunk/abdomen yesterday and woke up with chest pain today. Pt. also endorsing back pain. States back pain x 7 days, atraumatic. Chest pain is right and left upper chest, does not radiate down arm(s), not associated with exertion, no SOB, no diaphoresis. Pt. states she has also recently began drinking a new coffee and is unsure of allergy to abx vs mushroom coffee (ryze). Denies taking anything for pain or rash prior to arrival to ED. States chest pain is burning in nature. Son at bedside. NP and MD at bedside for assessment and discussion of plan of care. Patient undressed and placed into gown, call bell in hand and side rails up with bed in lowest position for safety. blanket provided. Comfort and safety provided.

## 2024-12-18 NOTE — ED PROVIDER NOTE - PHYSICAL EXAMINATION
CONSTITUTIONAL: Patient is awake, alert and oriented x 3. Patient is well appearing and in no acute distress  HEAD: NCAT  EYES: PERRL b/l, EOMI  NECK: supple, FROM  LUNGS: CTA b/l, no wheezing or rales   HEART: RRR.+S1S2 no murmurs  ABDOMEN: Soft, non-distended, nttp,  no rebound or guarding  EXTREMITY: No edema or calf tenderness b/l, FROM upper and lower ext b/l  SKIN: Scattered urticaria to b/l forearms and ankles   NEURO: No focal deficits

## 2024-12-19 ENCOUNTER — TRANSCRIPTION ENCOUNTER (OUTPATIENT)
Age: 49
End: 2024-12-19

## 2024-12-19 VITALS
DIASTOLIC BLOOD PRESSURE: 84 MMHG | TEMPERATURE: 98 F | OXYGEN SATURATION: 98 % | HEART RATE: 82 BPM | RESPIRATION RATE: 18 BRPM | SYSTOLIC BLOOD PRESSURE: 119 MMHG

## 2024-12-19 LAB
A1C WITH ESTIMATED AVERAGE GLUCOSE RESULT: 5.2 % — SIGNIFICANT CHANGE UP (ref 4–5.6)
ALBUMIN SERPL ELPH-MCNC: 3.7 G/DL — SIGNIFICANT CHANGE UP (ref 3.3–5)
ALP SERPL-CCNC: 80 U/L — SIGNIFICANT CHANGE UP (ref 40–120)
ALT FLD-CCNC: 12 U/L — SIGNIFICANT CHANGE UP (ref 10–45)
ANION GAP SERPL CALC-SCNC: 11 MMOL/L — SIGNIFICANT CHANGE UP (ref 5–17)
AST SERPL-CCNC: 8 U/L — LOW (ref 10–40)
BASOPHILS # BLD AUTO: 0.03 K/UL — SIGNIFICANT CHANGE UP (ref 0–0.2)
BASOPHILS NFR BLD AUTO: 0.4 % — SIGNIFICANT CHANGE UP (ref 0–2)
BILIRUB SERPL-MCNC: 0.3 MG/DL — SIGNIFICANT CHANGE UP (ref 0.2–1.2)
BUN SERPL-MCNC: 12 MG/DL — SIGNIFICANT CHANGE UP (ref 7–23)
CALCIUM SERPL-MCNC: 9.2 MG/DL — SIGNIFICANT CHANGE UP (ref 8.4–10.5)
CHLORIDE SERPL-SCNC: 106 MMOL/L — SIGNIFICANT CHANGE UP (ref 96–108)
CHOLEST SERPL-MCNC: 100 MG/DL — SIGNIFICANT CHANGE UP
CO2 SERPL-SCNC: 25 MMOL/L — SIGNIFICANT CHANGE UP (ref 22–31)
CREAT SERPL-MCNC: 0.57 MG/DL — SIGNIFICANT CHANGE UP (ref 0.5–1.3)
EGFR: 110 ML/MIN/1.73M2 — SIGNIFICANT CHANGE UP
EOSINOPHIL # BLD AUTO: 0.94 K/UL — HIGH (ref 0–0.5)
EOSINOPHIL NFR BLD AUTO: 12.2 % — HIGH (ref 0–6)
ESTIMATED AVERAGE GLUCOSE: 103 MG/DL — SIGNIFICANT CHANGE UP (ref 68–114)
GLUCOSE SERPL-MCNC: 97 MG/DL — SIGNIFICANT CHANGE UP (ref 70–99)
HCT VFR BLD CALC: 41.6 % — SIGNIFICANT CHANGE UP (ref 34.5–45)
HDLC SERPL-MCNC: 50 MG/DL — LOW
HGB BLD-MCNC: 13.2 G/DL — SIGNIFICANT CHANGE UP (ref 11.5–15.5)
IMM GRANULOCYTES NFR BLD AUTO: 0.4 % — SIGNIFICANT CHANGE UP (ref 0–0.9)
LIPID PNL WITH DIRECT LDL SERPL: 34 MG/DL — SIGNIFICANT CHANGE UP
LYMPHOCYTES # BLD AUTO: 1.56 K/UL — SIGNIFICANT CHANGE UP (ref 1–3.3)
LYMPHOCYTES # BLD AUTO: 20.3 % — SIGNIFICANT CHANGE UP (ref 13–44)
MCHC RBC-ENTMCNC: 27.7 PG — SIGNIFICANT CHANGE UP (ref 27–34)
MCHC RBC-ENTMCNC: 31.7 G/DL — LOW (ref 32–36)
MCV RBC AUTO: 87.4 FL — SIGNIFICANT CHANGE UP (ref 80–100)
MONOCYTES # BLD AUTO: 0.59 K/UL — SIGNIFICANT CHANGE UP (ref 0–0.9)
MONOCYTES NFR BLD AUTO: 7.7 % — SIGNIFICANT CHANGE UP (ref 2–14)
NEUTROPHILS # BLD AUTO: 4.54 K/UL — SIGNIFICANT CHANGE UP (ref 1.8–7.4)
NEUTROPHILS NFR BLD AUTO: 59 % — SIGNIFICANT CHANGE UP (ref 43–77)
NON HDL CHOLESTEROL: 50 MG/DL — SIGNIFICANT CHANGE UP
NRBC # BLD: 0 /100 WBCS — SIGNIFICANT CHANGE UP (ref 0–0)
PLATELET # BLD AUTO: 239 K/UL — SIGNIFICANT CHANGE UP (ref 150–400)
POTASSIUM SERPL-MCNC: 3.5 MMOL/L — SIGNIFICANT CHANGE UP (ref 3.5–5.3)
POTASSIUM SERPL-SCNC: 3.5 MMOL/L — SIGNIFICANT CHANGE UP (ref 3.5–5.3)
PROT SERPL-MCNC: 6.3 G/DL — SIGNIFICANT CHANGE UP (ref 6–8.3)
RBC # BLD: 4.76 M/UL — SIGNIFICANT CHANGE UP (ref 3.8–5.2)
RBC # FLD: 13.7 % — SIGNIFICANT CHANGE UP (ref 10.3–14.5)
SODIUM SERPL-SCNC: 142 MMOL/L — SIGNIFICANT CHANGE UP (ref 135–145)
TRIGL SERPL-MCNC: 76 MG/DL — SIGNIFICANT CHANGE UP
TROPONIN T, HIGH SENSITIVITY RESULT: <6 NG/L — SIGNIFICANT CHANGE UP (ref 0–51)
TSH SERPL-MCNC: 2.19 UIU/ML — SIGNIFICANT CHANGE UP (ref 0.27–4.2)
WBC # BLD: 7.69 K/UL — SIGNIFICANT CHANGE UP (ref 3.8–10.5)
WBC # FLD AUTO: 7.69 K/UL — SIGNIFICANT CHANGE UP (ref 3.8–10.5)

## 2024-12-19 PROCEDURE — C1887: CPT

## 2024-12-19 PROCEDURE — 84702 CHORIONIC GONADOTROPIN TEST: CPT

## 2024-12-19 PROCEDURE — 83036 HEMOGLOBIN GLYCOSYLATED A1C: CPT

## 2024-12-19 PROCEDURE — 80053 COMPREHEN METABOLIC PANEL: CPT

## 2024-12-19 PROCEDURE — 83690 ASSAY OF LIPASE: CPT

## 2024-12-19 PROCEDURE — 93005 ELECTROCARDIOGRAM TRACING: CPT

## 2024-12-19 PROCEDURE — 71045 X-RAY EXAM CHEST 1 VIEW: CPT

## 2024-12-19 PROCEDURE — 93458 L HRT ARTERY/VENTRICLE ANGIO: CPT

## 2024-12-19 PROCEDURE — 99285 EMERGENCY DEPT VISIT HI MDM: CPT | Mod: 25

## 2024-12-19 PROCEDURE — 83735 ASSAY OF MAGNESIUM: CPT

## 2024-12-19 PROCEDURE — C1769: CPT

## 2024-12-19 PROCEDURE — 84443 ASSAY THYROID STIM HORMONE: CPT

## 2024-12-19 PROCEDURE — 83880 ASSAY OF NATRIURETIC PEPTIDE: CPT

## 2024-12-19 PROCEDURE — 80061 LIPID PANEL: CPT

## 2024-12-19 PROCEDURE — C1894: CPT

## 2024-12-19 PROCEDURE — 85025 COMPLETE CBC W/AUTO DIFF WBC: CPT

## 2024-12-19 PROCEDURE — 84484 ASSAY OF TROPONIN QUANT: CPT

## 2024-12-19 RX ORDER — SODIUM CHLORIDE 9 MG/ML
1000 INJECTION, SOLUTION INTRAMUSCULAR; INTRAVENOUS; SUBCUTANEOUS
Refills: 0 | Status: DISCONTINUED | OUTPATIENT
Start: 2024-12-19 | End: 2024-12-19

## 2024-12-19 RX ORDER — AMLODIPINE BESYLATE 10 MG/1
1 TABLET ORAL
Qty: 30 | Refills: 0
Start: 2024-12-19 | End: 2025-01-17

## 2024-12-19 RX ORDER — DIPHENHYDRAMINE HCL 25 MG
1 CAPSULE ORAL
Qty: 90 | Refills: 0
Start: 2024-12-19 | End: 2025-01-17

## 2024-12-19 RX ORDER — HYDROCORTISONE BUTYRATE 0.1 %
1 CREAM (GRAM) TOPICAL
Qty: 1 | Refills: 0
Start: 2024-12-19 | End: 2025-01-17

## 2024-12-19 RX ORDER — ACETAMINOPHEN 500MG 500 MG/1
2 TABLET, COATED ORAL
Qty: 0 | Refills: 0 | DISCHARGE
Start: 2024-12-19

## 2024-12-19 RX ORDER — AMLODIPINE BESYLATE 10 MG/1
1 TABLET ORAL
Refills: 0 | DISCHARGE

## 2024-12-19 RX ORDER — DIPHENHYDRAMINE HCL 25 MG
25 CAPSULE ORAL EVERY 6 HOURS
Refills: 0 | Status: DISCONTINUED | OUTPATIENT
Start: 2024-12-19 | End: 2024-12-19

## 2024-12-19 RX ADMIN — Medication 1 APPLICATION(S): at 11:50

## 2024-12-19 RX ADMIN — AMLODIPINE BESYLATE 5 MILLIGRAM(S): 10 TABLET ORAL at 05:42

## 2024-12-19 RX ADMIN — Medication 25 MILLIGRAM(S): at 08:31

## 2024-12-19 RX ADMIN — Medication 25 MILLIGRAM(S): at 18:26

## 2024-12-19 RX ADMIN — LORAZEPAM 0.5 MILLIGRAM(S): 2 TABLET ORAL at 10:44

## 2024-12-19 RX ADMIN — Medication 81 MILLIGRAM(S): at 06:29

## 2024-12-19 RX ADMIN — SODIUM CHLORIDE 75 MILLILITER(S): 9 INJECTION, SOLUTION INTRAMUSCULAR; INTRAVENOUS; SUBCUTANEOUS at 15:41

## 2024-12-19 NOTE — DISCHARGE NOTE NURSING/CASE MANAGEMENT/SOCIAL WORK - PATIENT PORTAL LINK FT
You can access the FollowMyHealth Patient Portal offered by [facility] by registering at the following website: [email or web address]. By joining [facility]’s FollowMyHealth portal, you will also be able to view your health information using other applications (apps) compatible with our system.

## 2024-12-19 NOTE — DISCHARGE NOTE PROVIDER - NSDCFUADDAPPT_GEN_ALL_CORE_FT
APPTS ARE READY TO BE MADE: [x ] YES    Best Family or Patient Contact (if needed):    Additional Information about above appointments (if needed):    1:   2:   3:     Other comments or requests:    APPTS ARE READY TO BE MADE: [x ] YES    Best Family or Patient Contact (if needed):    Additional Information about above appointments (if needed):    1:   2:   3:   Patient informed us they already have secured a follow up appointment which is not visible on [facility].12/23/24  Other comments or requests:

## 2024-12-19 NOTE — PROGRESS NOTE ADULT - SUBJECTIVE AND OBJECTIVE BOX
Patient is a 49y old  Female who presents with a chief complaint of Chest pain (19 Dec 2024 11:10)      SUBJECTIVE / OVERNIGHT EVENTS:    Patient seen and examined. no cp sob.      Vital Signs Last 24 Hrs  T(C): 36.9 (19 Dec 2024 11:39), Max: 36.9 (19 Dec 2024 11:39)  T(F): 98.5 (19 Dec 2024 11:39), Max: 98.5 (19 Dec 2024 11:39)  HR: 83 (19 Dec 2024 11:39) (79 - 88)  BP: 122/88 (19 Dec 2024 11:39) (113/75 - 129/88)  BP(mean): --  RR: 18 (19 Dec 2024 11:39) (16 - 18)  SpO2: 96% (19 Dec 2024 11:39) (95% - 100%)    Parameters below as of 19 Dec 2024 11:39  Patient On (Oxygen Delivery Method): room air      I&O's Summary    19 Dec 2024 07:01  -  19 Dec 2024 13:00  --------------------------------------------------------  IN: 240 mL / OUT: 0 mL / NET: 240 mL        PE:  GENERAL: NAD, AAOx3  CHEST/LUNG: CTABL, No wheeze  HEART: Regular rate and rhythm; no murmur  ABDOMEN: Soft, Nontender, Nondistended; Bowel sounds present  EXTREMITIES:  2+ Peripheral Pulses, No edema  NEURO: No focal deficits    LABS:                        13.2   7.69  )-----------( 239      ( 19 Dec 2024 06:05 )             41.6     12-19    142  |  106  |  12  ----------------------------<  97  3.5   |  25  |  0.57    Ca    9.2      19 Dec 2024 06:05  Mg     2.1     12-18    TPro  6.3  /  Alb  3.7  /  TBili  0.3  /  DBili  x   /  AST  8[L]  /  ALT  12  /  AlkPhos  80  12-19      CAPILLARY BLOOD GLUCOSE            Urinalysis Basic - ( 19 Dec 2024 06:05 )    Color: x / Appearance: x / SG: x / pH: x  Gluc: 97 mg/dL / Ketone: x  / Bili: x / Urobili: x   Blood: x / Protein: x / Nitrite: x   Leuk Esterase: x / RBC: x / WBC x   Sq Epi: x / Non Sq Epi: x / Bacteria: x        RADIOLOGY & ADDITIONAL TESTS:    Imaging Personally Reviewed:  [x] YES  [ ] NO    Consultant(s) Notes Reviewed:  [x] YES  [ ] NO    MEDICATIONS  (STANDING):  amLODIPine   Tablet 5 milliGRAM(s) Oral daily  aspirin  chewable 81 milliGRAM(s) Oral daily  atorvastatin 40 milliGRAM(s) Oral at bedtime  hydrocortisone 1% Ointment 1 Application(s) Topical daily  losartan 100 milliGRAM(s) Oral daily    MEDICATIONS  (PRN):  acetaminophen     Tablet .. 650 milliGRAM(s) Oral every 6 hours PRN Temp greater or equal to 38C (100.4F), Mild Pain (1 - 3)  aluminum hydroxide/magnesium hydroxide/simethicone Suspension 30 milliLiter(s) Oral every 4 hours PRN Dyspepsia  diphenhydrAMINE 25 milliGRAM(s) Oral every 6 hours PRN Rash and/or Itching  LORazepam     Tablet 0.5 milliGRAM(s) Oral two times a day PRN Anxiety  melatonin 3 milliGRAM(s) Oral at bedtime PRN Insomnia  ondansetron Injectable 4 milliGRAM(s) IV Push every 8 hours PRN Nausea and/or Vomiting      Care Discussed with Consultants/Other Providers [x] YES  [ ] NO    HEALTH ISSUES - PROBLEM Dx:  Chest pain    CAD (coronary artery disease)    Hypertension    Hyperlipidemia    Need for prophylactic measure    Anxiety

## 2024-12-19 NOTE — DISCHARGE NOTE NURSING/CASE MANAGEMENT/SOCIAL WORK - NSDCPEFALRISK_GEN_ALL_CORE
For information on Fall & Injury Prevention, visit: [email or web address] OR  [email or web address] OR  https://www.cdc.gov/steadi/patient.html

## 2024-12-19 NOTE — DISCHARGE NOTE NURSING/CASE MANAGEMENT/SOCIAL WORK - FINANCIAL ASSISTANCE
[facility] provides services at a reduced cost to those who are determined to be eligible through [facility]’s financial assistance program. Information regarding [facility]’s financial assistance program can be found by going to [email or web address] or by calling [phone].

## 2024-12-19 NOTE — DISCHARGE NOTE PROVIDER - CARE PROVIDER_API CALL
Arron Roman  Cardiology  [address] [place] [address], [facility] [address]  [place] [address]  Phone: [phone]  Fax: [phone]  Follow Up Time: 1-3 days

## 2024-12-19 NOTE — PROGRESS NOTE ADULT - ASSESSMENT
49 year old female with past medical history of CAD s/p 2 stents presents with CP. Plan for cardiac cath in AM.     Chest pain  EKG no ischemic changes  Troponin negative x2  Referred by cardiologist outpatient per Cath  Followup Lipid Panel, TSH, A1c  Continue Aspirin  Per patient, no longer taking Brilinta or Plavix  Continue high dose statin    CAD (coronary artery disease)  history of 2 stents  Cards consulted as above.    Hypertension.   Continue Home meds  Monitor vital signs    Hyperlipidemia  Continue high dose statin  Followup Lipid Profile    Anxiety.   Pt takes Lorazepam BID    DVT Ppx: Heparin subq    Please contact with any questions or concerns [phone].

## 2024-12-19 NOTE — DISCHARGE NOTE PROVIDER - NSDCMRMEDTOKEN_GEN_ALL_CORE_FT
acetaminophen 325 mg oral tablet: 2 tab(s) orally every 6 hours As needed Temp greater or equal to 38C (100.4F), Mild Pain (1 - 3)  aspirin 81 mg oral delayed release tablet: 1 tab(s) orally once a day  LORazepam 0.5 mg oral tablet: 1 tab(s) orally 2 times a day, As Needed  losartan 100 mg oral tablet: 1 tab(s) orally once a day  Multiple Vitamins oral tablet: 1 tab(s) orally once a day  Ozempic 2 mg/1.5 mL (1 mg dose) subcutaneous solution: 1 milligram(s) subcutaneous once a week  Praluent Pen 150 mg/mL subcutaneous solution: 150 milligram(s) subcutaneously every 2 weeks

## 2024-12-19 NOTE — PRE-OP CHECKLIST - TEMPERATURE IN CELSIUS (DEGREES C)
36.9 Detail Level: Zone Otc Regimen: Recommend spf 30 or greater, reapply to sun exposed areas every two hours.

## 2024-12-19 NOTE — DISCHARGE NOTE PROVIDER - HOSPITAL COURSE
HPI:  49 year old woman with past medical history of anxiety and CAD s/p stent in 2020, HTN, HLD who presents for chest pain. Patient describes feeling intermittent pain in her back. The sensation is likened to what she felt when she got her stents. The pain is described as a intermittent burning sensation. She spoke to her cardiologist Dr. Roman who referred her to [facility] ED for possible cardiac cath. Per ED, Interventional cardiology is aware and planning for cath tomorrow. Of note, patient was recently treated for UTI with macrobid and had hives for the last few days. She is unsure if it is related to the antibiotic or to a new mushroom supplement that she is taking. She denies any difficulty swallowing, nausea/vomiting/dizziness. She took ASA this morning.  (18 Dec 2024 17:30)    Hospital Course:      Important Medication Changes and Reason:  see medication form     Active or Pending Issues Requiring Follow-up:  Outpatient follow up  with cardiology     Advanced Directives:   [ x] Full code  [ ] DNR  [ ] Hospice    Discharge Diagnoses:         HPI:  49 year old woman with past medical history of anxiety and CAD s/p stent in 2020, HTN, HLD who presents for chest pain. Patient describes feeling intermittent pain in her back. The sensation is likened to what she felt when she got her stents. The pain is described as a intermittent burning sensation. She spoke to her cardiologist Dr. Roman who referred her to [facility] ED for possible cardiac cath. Per ED, Interventional cardiology is aware and planning for cath tomorrow. Of note, patient was recently treated for UTI with macrobid and had hives for the last few days. She is unsure if it is related to the antibiotic or to a new mushroom supplement that she is taking. She denies any difficulty swallowing, nausea/vomiting/dizziness. She took ASA this morning.  (18 Dec 2024 17:30)    Hospital Course:  49 year old female with past medical history of CAD s/p 2 stents presents with CP. Plan for cardiac cath today     1. Chest pain  EKG no ischemic changes  Troponin negative x2  Continue Aspirin  Continue high dose statin  s/p cardiac cath which was diagnostic     2. CAD (coronary artery disease)  history of 2 stents  Cards consulted as above.    3. Hypertension.   Continue Losartan 100 mg po daily    Norvasc was decreased to 5 mg po daily     4 Hyperlipidemia  Continue high dose statin    5. Anxiety.   continue Lorazepam BID as needed    Important Medication Changes and Reason:  see medication form     Active or Pending Issues Requiring Follow-up:  Outpatient follow up  with cardiology     Advanced Directives:   [ x] Full code  [ ] DNR  [ ] Hospice    Discharge Diagnoses:  Chest pain   CAD   HTN  HLD  Anxiety

## 2024-12-19 NOTE — DISCHARGE NOTE PROVIDER - NSDCCPCAREPLAN_GEN_ALL_CORE_FT
PRINCIPAL DISCHARGE DIAGNOSIS  Diagnosis: Chest pain, radiating  Assessment and Plan of Treatment: EKG without STEMI  - troponin negative x 2  - continue ASA 81mg QD  s/p cardiac cath: whid was diagnostic   Outpatient follow up with cardiologist in 3 days      SECONDARY DISCHARGE DIAGNOSES  Diagnosis: CAD (coronary artery disease)  Assessment and Plan of Treatment: Continue Aspirin and Brillinta   Coronary artery disease is a condition where the arteries the supply the heart muscle get clogged with fatty deposits & puts you at risk for a heart attack  Call your doctor if you have any new pain, pressure, or discomfort in the center of your chest, pain, tingling or discomfort in arms, back, neck, jaw, or stomach, shortness of breath, nausea, vomiting, burping or heartburn, sweating, cold and clammy skin, racing or abnormal heartbeat for more than 10 minutes or if they keep coming & going.  Call 911 and do not try to get to hospital by self   You can help yourself with lifestyle changes (quitting smoking if you smoke), eat lots of fruits & vegetables & low fat dairy products, not a lot of meat & fatty foods, walk or some form of physical activity most days of the week, lose weight if you are overweight  Take your cardiac medication as prescribed to lower cholesterol, to lower blood pressure, aspirin to prevent blood clots, and diabetes control  Make sure to keep appointments with doctor for cardiac follow up care      Diagnosis: Hypertension  Assessment and Plan of Treatment: Follow up with your medical doctor to establish long term blood pressure treatment goals.  continue Losartan 100 mg po daily   continue Norvasc 5mg po daily

## 2024-12-19 NOTE — DISCHARGE NOTE PROVIDER - NSDCFUSCHEDAPPT_GEN_ALL_CORE_FT
[facility]  INTMED [address]  Scheduled Appointment: 02/05/2025    [facility]  DERM [address]  Scheduled Appointment: 03/13/2025

## 2025-01-08 ENCOUNTER — TRANSCRIPTION ENCOUNTER (OUTPATIENT)
Age: 50
End: 2025-01-08

## 2025-01-08 ENCOUNTER — OUTPATIENT (OUTPATIENT)
Dept: OUTPATIENT SERVICES | Facility: HOSPITAL | Age: 50
LOS: 1 days | End: 2025-01-08
Payer: MEDICAID

## 2025-01-08 VITALS
RESPIRATION RATE: 15 BRPM | HEART RATE: 71 BPM | OXYGEN SATURATION: 100 % | SYSTOLIC BLOOD PRESSURE: 144 MMHG | DIASTOLIC BLOOD PRESSURE: 88 MMHG

## 2025-01-08 VITALS
SYSTOLIC BLOOD PRESSURE: 132 MMHG | WEIGHT: 184.97 LBS | HEART RATE: 73 BPM | HEIGHT: 67 IN | DIASTOLIC BLOOD PRESSURE: 84 MMHG | OXYGEN SATURATION: 100 % | RESPIRATION RATE: 15 BRPM

## 2025-01-08 DIAGNOSIS — D27.1 BENIGN NEOPLASM OF LEFT OVARY: Chronic | ICD-10-CM

## 2025-01-08 DIAGNOSIS — Z98.890 OTHER SPECIFIED POSTPROCEDURAL STATES: Chronic | ICD-10-CM

## 2025-01-08 LAB
ANION GAP SERPL CALC-SCNC: 14 MMOL/L — SIGNIFICANT CHANGE UP (ref 5–17)
BUN SERPL-MCNC: 18 MG/DL — SIGNIFICANT CHANGE UP (ref 7–23)
CALCIUM SERPL-MCNC: 8.9 MG/DL — SIGNIFICANT CHANGE UP (ref 8.4–10.5)
CHLORIDE SERPL-SCNC: 107 MMOL/L — SIGNIFICANT CHANGE UP (ref 96–108)
CO2 SERPL-SCNC: 23 MMOL/L — SIGNIFICANT CHANGE UP (ref 22–31)
CREAT SERPL-MCNC: 0.54 MG/DL — SIGNIFICANT CHANGE UP (ref 0.5–1.3)
EGFR: 111 ML/MIN/1.73M2 — SIGNIFICANT CHANGE UP
GLUCOSE SERPL-MCNC: 109 MG/DL — HIGH (ref 70–99)
HCT VFR BLD CALC: 41 % — SIGNIFICANT CHANGE UP (ref 34.5–45)
HGB BLD-MCNC: 13 G/DL — SIGNIFICANT CHANGE UP (ref 11.5–15.5)
MCHC RBC-ENTMCNC: 27.6 PG — SIGNIFICANT CHANGE UP (ref 27–34)
MCHC RBC-ENTMCNC: 31.7 G/DL — LOW (ref 32–36)
MCV RBC AUTO: 87 FL — SIGNIFICANT CHANGE UP (ref 80–100)
NRBC # BLD: 0 /100 WBCS — SIGNIFICANT CHANGE UP (ref 0–0)
PLATELET # BLD AUTO: 264 K/UL — SIGNIFICANT CHANGE UP (ref 150–400)
POTASSIUM SERPL-MCNC: 3.9 MMOL/L — SIGNIFICANT CHANGE UP (ref 3.5–5.3)
POTASSIUM SERPL-SCNC: 3.9 MMOL/L — SIGNIFICANT CHANGE UP (ref 3.5–5.3)
RBC # BLD: 4.71 M/UL — SIGNIFICANT CHANGE UP (ref 3.8–5.2)
RBC # FLD: 13.6 % — SIGNIFICANT CHANGE UP (ref 10.3–14.5)
SODIUM SERPL-SCNC: 144 MMOL/L — SIGNIFICANT CHANGE UP (ref 135–145)
WBC # BLD: 6.95 K/UL — SIGNIFICANT CHANGE UP (ref 3.8–10.5)
WBC # FLD AUTO: 6.95 K/UL — SIGNIFICANT CHANGE UP (ref 3.8–10.5)

## 2025-01-08 PROCEDURE — C1887: CPT

## 2025-01-08 PROCEDURE — C1894: CPT

## 2025-01-08 PROCEDURE — 80048 BASIC METABOLIC PNL TOTAL CA: CPT

## 2025-01-08 PROCEDURE — 93010 ELECTROCARDIOGRAM REPORT: CPT

## 2025-01-08 PROCEDURE — 93454 CORONARY ARTERY ANGIO S&I: CPT

## 2025-01-08 PROCEDURE — 85027 COMPLETE CBC AUTOMATED: CPT

## 2025-01-08 PROCEDURE — 0523T NTRAPX C FFR W/3D FUNCJL MAP: CPT

## 2025-01-08 PROCEDURE — C1769: CPT

## 2025-01-08 PROCEDURE — 93005 ELECTROCARDIOGRAM TRACING: CPT

## 2025-01-08 RX ORDER — SODIUM CHLORIDE 9 MG/ML
250 INJECTION, SOLUTION INTRAMUSCULAR; INTRAVENOUS; SUBCUTANEOUS ONCE
Refills: 0 | Status: COMPLETED | OUTPATIENT
Start: 2025-01-08 | End: 2025-01-08

## 2025-01-08 RX ORDER — ALIROCUMAB 75 MG/ML
150 INJECTION, SOLUTION SUBCUTANEOUS
Refills: 0 | DISCHARGE

## 2025-01-08 RX ORDER — ACETAMINOPHEN 80 MG/.8ML
1000 SOLUTION/ DROPS ORAL ONCE
Refills: 0 | Status: COMPLETED | OUTPATIENT
Start: 2025-01-08 | End: 2025-01-08

## 2025-01-08 RX ORDER — SODIUM CHLORIDE 9 MG/ML
1000 INJECTION, SOLUTION INTRAMUSCULAR; INTRAVENOUS; SUBCUTANEOUS
Refills: 0 | Status: DISCONTINUED | OUTPATIENT
Start: 2025-01-08 | End: 2025-01-22

## 2025-01-08 RX ORDER — CYANOCOBALAMIN/FOLIC AC/VIT B6 1-2.2-25MG
1 TABLET ORAL
Refills: 0 | DISCHARGE

## 2025-01-08 RX ORDER — ORAL SEMAGLUTIDE 7 MG/1
1 TABLET ORAL
Refills: 0 | DISCHARGE

## 2025-01-08 RX ADMIN — ACETAMINOPHEN 400 MILLIGRAM(S): 80 SOLUTION/ DROPS ORAL at 13:57

## 2025-01-08 RX ADMIN — Medication 330 MILLIGRAM(S): at 13:18

## 2025-01-08 RX ADMIN — SODIUM CHLORIDE 75 MILLILITER(S): 9 INJECTION, SOLUTION INTRAMUSCULAR; INTRAVENOUS; SUBCUTANEOUS at 10:37

## 2025-01-08 RX ADMIN — SODIUM CHLORIDE 500 MILLILITER(S): 9 INJECTION, SOLUTION INTRAMUSCULAR; INTRAVENOUS; SUBCUTANEOUS at 10:38

## 2025-01-08 NOTE — ASU DISCHARGE PLAN (ADULT/PEDIATRIC) - FINANCIAL ASSISTANCE
[facility] provides services at a reduced cost to those who are determined to be eligible through [facility]’s financial assistance program. Information regarding [facility]’s financial assistance program can be found by going to [email or web address] or by calling [phone].

## 2025-01-08 NOTE — ASU PATIENT PROFILE, ADULT - ABILITY TO HEAR (WITH HEARING AID OR HEARING APPLIANCE IF NORMALLY USED):
Complex Repair And Burow's Graft Text: The defect edges were debeveled with a #15 scalpel blade.  The primary defect was closed partially with a complex linear closure.  Given the location of the defect, shape of the defect, the proximity to free margins and the presence of a standing cone deformity a Burow's graft was deemed most appropriate to repair the remaining defect.  The graft was trimmed to fit the size of the remaining defect.  The graft was then placed in the primary defect, oriented appropriately, and sutured into place. Did You Provide Opioid Counseling: No Complex Repair And Skin Substitute Graft Text: The defect edges were debeveled with a #15 scalpel blade.  The primary defect was closed partially with a complex linear closure.  Given the location of the remaining defect, shape of the defect and the proximity to free margins a skin substitute graft was deemed most appropriate to repair the remaining defect.  The graft was trimmed to fit the size of the remaining defect.  The graft was then placed in the primary defect, oriented appropriately, and sutured into place. Hemostasis: Electrocautery Show Primary And Secondary Defect Sizes Variable (Do Not Hide If You Perform Flaps Or Graft Closures): Yes Spiral Flap Text: The defect edges were debeveled with a #15 scalpel blade.  Given the location of the defect, shape of the defect and the proximity to free margins a spiral flap was deemed most appropriate.  Using a sterile surgical marker, an appropriate rotation flap was drawn incorporating the defect and placing the expected incisions within the relaxed skin tension lines where possible. The area thus outlined was incised deep to adipose tissue with a #15 scalpel blade.  The skin margins were undermined to an appropriate distance in all directions utilizing iris scissors. Mustarde Flap Text: The defect edges were debeveled with a #15 scalpel blade.  Given the size, depth and location of the defect and the proximity to free margins a Mustarde flap was deemed most appropriate.  Using a sterile surgical marker, an appropriate flap was drawn incorporating the defect. The area thus outlined was incised with a #15 scalpel blade.  The skin margins were undermined to an appropriate distance in all directions utilizing iris scissors. Primary Defect Width (In Cm): 0 Slit Excision Additional Text (Leave Blank If You Do Not Want): A linear line was drawn on the skin overlying the lesion. An incision was made slowly until the lesion was visualized.  Once visualized, the lesion was removed with blunt dissection. Complex Repair And Xenograft Text: The defect edges were debeveled with a #15 scalpel blade.  The primary defect was closed partially with a complex linear closure.  Given the location of the defect, shape of the defect and the proximity to free margins a xenograft was deemed most appropriate to repair the remaining defect.  The graft was trimmed to fit the size of the remaining defect.  The graft was then placed in the primary defect, oriented appropriately, and sutured into place. Burow's Advancement Flap Text: The defect edges were debeveled with a #15 scalpel blade.  Given the location of the defect and the proximity to free margins a Burow's advancement flap was deemed most appropriate.  Using a sterile surgical marker, the appropriate advancement flap was drawn incorporating the defect and placing the expected incisions within the relaxed skin tension lines where possible.    The area thus outlined was incised deep to adipose tissue with a #15 scalpel blade.  The skin margins were undermined to an appropriate distance in all directions utilizing iris scissors. Keystone Flap Text: The defect edges were debeveled with a #15 scalpel blade.  Given the location of the defect, shape of the defect a keystone flap was deemed most appropriate.  Using a sterile surgical marker, an appropriate keystone flap was drawn incorporating the defect, outlining the appropriate donor tissue and placing the expected incisions within the relaxed skin tension lines where possible. The area thus outlined was incised deep to adipose tissue with a #15 scalpel blade.  The skin margins were undermined to an appropriate distance in all directions around the primary defect and laterally outward around the flap utilizing iris scissors. Complex Repair And Melolabial Flap Text: The defect edges were debeveled with a #15 scalpel blade.  The primary defect was closed partially with a complex linear closure.  Given the location of the remaining defect, shape of the defect and the proximity to free margins a melolabial flap was deemed most appropriate for complete closure of the defect.  Using a sterile surgical marker, an appropriate advancement flap was drawn incorporating the defect and placing the expected incisions within the relaxed skin tension lines where possible.    The area thus outlined was incised deep to adipose tissue with a #15 scalpel blade.  The skin margins were undermined to an appropriate distance in all directions utilizing iris scissors. Mucosal Advancement Flap Text: Given the location of the defect, shape of the defect and the proximity to free margins a mucosal advancement flap was deemed most appropriate. Incisions were made with a 15 blade scalpel in the appropriate fashion along the cutaneous vermilion border and the mucosal lip. The remaining actinically damaged mucosal tissue was excised.  The mucosal advancement flap was then elevated to the gingival sulcus with care taken to preserve the neurovascular structures and advanced into the primary defect. Care was taken to ensure that precise realignment of the vermilion border was achieved. Hatchet Flap Text: The defect edges were debeveled with a #15 scalpel blade.  Given the location of the defect, shape of the defect and the proximity to free margins a hatchet flap was deemed most appropriate.  Using a sterile surgical marker, an appropriate hatchet flap was drawn incorporating the defect and placing the expected incisions within the relaxed skin tension lines where possible.    The area thus outlined was incised deep to adipose tissue with a #15 scalpel blade.  The skin margins were undermined to an appropriate distance in all directions utilizing iris scissors. Anesthesia Volume In Cc: 3 Double M-Plasty Intermediate Repair Preamble Text (Leave Blank If You Do Not Want): Undermining was performed with blunt dissection. Burow's Graft Text: The defect edges were debeveled with a #15 scalpel blade.  Given the location of the defect, shape of the defect, the proximity to free margins and the presence of a standing cone deformity a Burow's skin graft was deemed most appropriate. The standing cone was removed and this tissue was then trimmed to the shape of the primary defect. The adipose tissue was also removed until only dermis and epidermis were left.  The skin margins of the secondary defect were undermined to an appropriate distance in all directions utilizing iris scissors.  The secondary defect was closed with interrupted buried subcutaneous sutures.  The skin edges were then re-apposed with running  sutures.  The skin graft was then placed in the primary defect and oriented appropriately. Abbe Flap (Upper To Lower Lip) Text: The defect of the lower lip was assessed and measured.  Given the location and size of the defect, an Abbe flap was deemed most appropriate.  Using a sterile surgical marker, an appropriate Abbe flap was measured and drawn on the upper lip. Local anesthesia was then infiltrated.  A scalpel was then used to incise the upper lip through and through the skin, vermilion, muscle and mucosa, leaving the flap pedicled on the opposite side.  The flap was then rotated and transferred to the lower lip defect.  The flap was then sutured into place with a three layer technique, closing the orbicularis oris muscle layer with subcutaneous buried sutures, followed by a mucosal layer and an epidermal layer. Hemigard Retention Suture: 2-0 Nylon Bilobed Flap Text: The defect edges were debeveled with a #15 scalpel blade.  Given the location of the defect and the proximity to free margins a bilobe flap was deemed most appropriate.  Using a sterile surgical marker, an appropriate bilobe flap drawn around the defect.    The area thus outlined was incised deep to adipose tissue with a #15 scalpel blade.  The skin margins were undermined to an appropriate distance in all directions utilizing iris scissors. O-Z Flap Text: The defect edges were debeveled with a #15 scalpel blade.  Given the location of the defect, shape of the defect and the proximity to free margins an O-Z flap was deemed most appropriate.  Using a sterile surgical marker, an appropriate transposition flap was drawn incorporating the defect and placing the expected incisions within the relaxed skin tension lines where possible. The area thus outlined was incised deep to adipose tissue with a #15 scalpel blade.  The skin margins were undermined to an appropriate distance in all directions utilizing iris scissors. Vermilion Border Text: The closure involved the vermilion border. Composite Graft Text: The defect edges were debeveled with a #15 scalpel blade.  Given the location of the defect, shape of the defect, the proximity to free margins and the fact the defect was full thickness a composite graft was deemed most appropriate.  The defect was outline and then transferred to the donor site.  A full thickness graft was then excised from the donor site. The graft was then placed in the primary defect, oriented appropriately and then sutured into place.  The secondary defect was then repaired using a primary closure. Orbicularis Oris Muscle Flap Text: The defect edges were debeveled with a #15 scalpel blade.  Given that the defect affected the competency of the oral sphincter an orbicularis oris muscle flap was deemed most appropriate to restore this competency and normal muscle function.  Using a sterile surgical marker, an appropriate flap was drawn incorporating the defect. The area thus outlined was incised with a #15 scalpel blade. Cartilage Graft Text: The defect edges were debeveled with a #15 scalpel blade.  Given the location of the defect, shape of the defect, the fact the defect involved a full thickness cartilage defect a cartilage graft was deemed most appropriate.  An appropriate donor site was identified, cleansed, and anesthetized. The cartilage graft was then harvested and transferred to the recipient site, oriented appropriately and then sutured into place.  The secondary defect was then repaired using a primary closure. Island Pedicle Flap With Canthal Suspension Text: The defect edges were debeveled with a #15 scalpel blade.  Given the location of the defect, shape of the defect and the proximity to free margins an island pedicle advancement flap was deemed most appropriate.  Using a sterile surgical marker, an appropriate advancement flap was drawn incorporating the defect, outlining the appropriate donor tissue and placing the expected incisions within the relaxed skin tension lines where possible. The area thus outlined was incised deep to adipose tissue with a #15 scalpel blade.  The skin margins were undermined to an appropriate distance in all directions around the primary defect and laterally outward around the island pedicle utilizing iris scissors.  There was minimal undermining beneath the pedicle flap. A suspension suture was placed in the canthal tendon to prevent tension and prevent ectropion. Split-Thickness Skin Graft Text: The defect edges were debeveled with a #15 scalpel blade.  Given the location of the defect, shape of the defect and the proximity to free margins a split thickness skin graft was deemed most appropriate.  Using a sterile surgical marker, the primary defect shape was transferred to the donor site. The split thickness graft was then harvested.  The skin graft was then placed in the primary defect and oriented appropriately. Size Of Lesion In Cm: 0.7 Complex Repair And Z Plasty Text: The defect edges were debeveled with a #15 scalpel blade.  The primary defect was closed partially with a complex linear closure.  Given the location of the remaining defect, shape of the defect and the proximity to free margins a Z plasty was deemed most appropriate for complete closure of the defect.  Using a sterile surgical marker, an appropriate advancement flap was drawn incorporating the defect and placing the expected incisions within the relaxed skin tension lines where possible.    The area thus outlined was incised deep to adipose tissue with a #15 scalpel blade.  The skin margins were undermined to an appropriate distance in all directions utilizing iris scissors. Suturegard Body: The suture ends were repeatedly re-tightened and re-clamped to achieve the desired tissue expansion. Number Of Hemigard Strips Per Side: 1 Retention Suture Bite Size: 3 mm V-Y Plasty Text: The defect edges were debeveled with a #15 scalpel blade.  Given the location of the defect, shape of the defect and the proximity to free margins an V-Y advancement flap was deemed most appropriate.  Using a sterile surgical marker, an appropriate advancement flap was drawn incorporating the defect and placing the expected incisions within the relaxed skin tension lines where possible.    The area thus outlined was incised deep to adipose tissue with a #15 scalpel blade.  The skin margins were undermined to an appropriate distance in all directions utilizing iris scissors. Repair Type: Intermediate Advancement-Rotation Flap Text: The defect edges were debeveled with a #15 scalpel blade.  Given the location of the defect, shape of the defect and the proximity to free margins an advancement-rotation flap was deemed most appropriate.  Using a sterile surgical marker, an appropriate flap was drawn incorporating the defect and placing the expected incisions within the relaxed skin tension lines where possible. The area thus outlined was incised deep to adipose tissue with a #15 scalpel blade.  The skin margins were undermined to an appropriate distance in all directions utilizing iris scissors. Ear Star Wedge Flap Text: The defect edges were debeveled with a #15 blade scalpel.  Given the location of the defect and the proximity to free margins (helical rim) an ear star wedge flap was deemed most appropriate.  Using a sterile surgical marker, the appropriate flap was drawn incorporating the defect and placing the expected incisions between the helical rim and antihelix where possible.  The area thus outlined was incised through and through with a #15 scalpel blade. Posterior Auricular Interpolation Flap Text: A decision was made to reconstruct the defect utilizing an interpolation axial flap and a staged reconstruction.  A telfa template was made of the defect.  This telfa template was then used to outline the posterior auricular interpolation flap.  The donor area for the pedicle flap was then injected with anesthesia.  The flap was excised through the skin and subcutaneous tissue down to the layer of the underlying musculature.  The pedicle flap was carefully excised within this deep plane to maintain its blood supply.  The edges of the donor site were undermined.   The donor site was closed in a primary fashion.  The pedicle was then rotated into position and sutured.  Once the tube was sutured into place, adequate blood supply was confirmed with blanching and refill.  The pedicle was then wrapped with xeroform gauze and dressed appropriately with a telfa and gauze bandage to ensure continued blood supply and protect the attached pedicle. Nasalis-Muscle-Based Myocutaneous Island Pedicle Flap Text: Using a #15 blade, an incision was made around the donor flap to the level of the nasalis muscle. Wide lateral undermining was then performed in both the subcutaneous plane above the nasalis muscle, and in a submuscular plane just above periosteum. This allowed the formation of a free nasalis muscle axial pedicle (based on the angular artery) which was still attached to the actual cutaneous flap, increasing its mobility and vascular viability. Hemostasis was obtained with pinpoint electrocoagulation. The flap was mobilized into position and the pivotal anchor points positioned and stabilized with buried interrupted sutures. Subcutaneous and dermal tissues were closed in a multilayered fashion with sutures. Tissue redundancies were excised, and the epidermal edges were apposed without significant tension and sutured with sutures. A-T Advancement Flap Text: The defect edges were debeveled with a #15 scalpel blade.  Given the location of the defect, shape of the defect and the proximity to free margins an A-T advancement flap was deemed most appropriate.  Using a sterile surgical marker, an appropriate advancement flap was drawn incorporating the defect and placing the expected incisions within the relaxed skin tension lines where possible.    The area thus outlined was incised deep to adipose tissue with a #15 scalpel blade.  The skin margins were undermined to an appropriate distance in all directions utilizing iris scissors. Complex Repair And Split-Thickness Skin Graft Text: The defect edges were debeveled with a #15 scalpel blade.  The primary defect was closed partially with a complex linear closure.  Given the location of the defect, shape of the defect and the proximity to free margins a split thickness skin graft was deemed most appropriate to repair the remaining defect.  The graft was trimmed to fit the size of the remaining defect.  The graft was then placed in the primary defect, oriented appropriately, and sutured into place. Where Do You Want The Question To Include Opioid Counseling Located?: Case Summary Tab Detail Level: Detailed Path Notes (To The Dermatopathologist): Please check margins. Star Wedge Flap Text: The defect edges were debeveled with a #15 scalpel blade.  Given the location of the defect, shape of the defect and the proximity to free margins a star wedge flap was deemed most appropriate.  Using a sterile surgical marker, an appropriate rotation flap was drawn incorporating the defect and placing the expected incisions within the relaxed skin tension lines where possible. The area thus outlined was incised deep to adipose tissue with a #15 scalpel blade.  The skin margins were undermined to an appropriate distance in all directions utilizing iris scissors. Double O-Z Plasty Text: The defect edges were debeveled with a #15 scalpel blade.  Given the location of the defect, shape of the defect and the proximity to free margins a Double O-Z plasty (double transposition flap) was deemed most appropriate.  Using a sterile surgical marker, the appropriate transposition flaps were drawn incorporating the defect and placing the expected incisions within the relaxed skin tension lines where possible. The area thus outlined was incised deep to adipose tissue with a #15 scalpel blade.  The skin margins were undermined to an appropriate distance in all directions utilizing iris scissors.  Hemostasis was achieved with electrocautery.  The flaps were then transposed into place, one clockwise and the other counterclockwise, and anchored with interrupted buried subcutaneous sutures. Helical Rim Advancement Flap Text: The defect edges were debeveled with a #15 blade scalpel.  Given the location of the defect and the proximity to free margins (helical rim) a double helical rim advancement flap was deemed most appropriate.  Using a sterile surgical marker, the appropriate advancement flaps were drawn incorporating the defect and placing the expected incisions between the helical rim and antihelix where possible.  The area thus outlined was incised through and through with a #15 scalpel blade.  With a skin hook and iris scissors, the flaps were gently and sharply undermined and freed up. Complex Repair And Dorsal Nasal Flap Text: The defect edges were debeveled with a #15 scalpel blade.  The primary defect was closed partially with a complex linear closure.  Given the location of the remaining defect, shape of the defect and the proximity to free margins a dorsal nasal flap was deemed most appropriate for complete closure of the defect.  Using a sterile surgical marker, an appropriate flap was drawn incorporating the defect and placing the expected incisions within the relaxed skin tension lines where possible.    The area thus outlined was incised deep to adipose tissue with a #15 scalpel blade.  The skin margins were undermined to an appropriate distance in all directions utilizing iris scissors. Melolabial Transposition Flap Text: The defect edges were debeveled with a #15 scalpel blade.  Given the location of the defect and the proximity to free margins a melolabial flap was deemed most appropriate.  Using a sterile surgical marker, an appropriate melolabial transposition flap was drawn incorporating the defect.    The area thus outlined was incised deep to adipose tissue with a #15 scalpel blade.  The skin margins were undermined to an appropriate distance in all directions utilizing iris scissors. Staged Advancement Flap Text: The defect edges were debeveled with a #15 scalpel blade.  Given the location of the defect, shape of the defect and the proximity to free margins a staged advancement flap was deemed most appropriate.  Using a sterile surgical marker, an appropriate advancement flap was drawn incorporating the defect and placing the expected incisions within the relaxed skin tension lines where possible. The area thus outlined was incised deep to adipose tissue with a #15 scalpel blade.  The skin margins were undermined to an appropriate distance in all directions utilizing iris scissors. Crescentic Advancement Flap Text: The defect edges were debeveled with a #15 scalpel blade.  Given the location of the defect and the proximity to free margins a crescentic advancement flap was deemed most appropriate.  Using a sterile surgical marker, the appropriate advancement flap was drawn incorporating the defect and placing the expected incisions within the relaxed skin tension lines where possible.    The area thus outlined was incised deep to adipose tissue with a #15 scalpel blade.  The skin margins were undermined to an appropriate distance in all directions utilizing iris scissors. Adequate: hears normal conversation without difficulty M-Plasty Complex Repair Preamble Text (Leave Blank If You Do Not Want): Extensive wide undermining was performed. Complex Repair And Transposition Flap Text: The defect edges were debeveled with a #15 scalpel blade.  The primary defect was closed partially with a complex linear closure.  Given the location of the remaining defect, shape of the defect and the proximity to free margins a transposition flap was deemed most appropriate for complete closure of the defect.  Using a sterile surgical marker, an appropriate advancement flap was drawn incorporating the defect and placing the expected incisions within the relaxed skin tension lines where possible.    The area thus outlined was incised deep to adipose tissue with a #15 scalpel blade.  The skin margins were undermined to an appropriate distance in all directions utilizing iris scissors. Excision Depth: adipose tissue Island Pedicle Flap Text: The defect edges were debeveled with a #15 scalpel blade.  Given the location of the defect, shape of the defect and the proximity to free margins an island pedicle advancement flap was deemed most appropriate.  Using a sterile surgical marker, an appropriate advancement flap was drawn incorporating the defect, outlining the appropriate donor tissue and placing the expected incisions within the relaxed skin tension lines where possible.    The area thus outlined was incised deep to adipose tissue with a #15 scalpel blade.  The skin margins were undermined to an appropriate distance in all directions around the primary defect and laterally outward around the island pedicle utilizing iris scissors.  There was minimal undermining beneath the pedicle flap. Curvilinear Excision Additional Text (Leave Blank If You Do Not Want): The margin was drawn around the clinically apparent lesion.  A curvilinear shape was then drawn on the skin incorporating the lesion and margins.  Incisions were then made along these lines to the appropriate tissue plane and the lesion was extirpated. Complex Repair And Epidermal Autograft Text: The defect edges were debeveled with a #15 scalpel blade.  The primary defect was closed partially with a complex linear closure.  Given the location of the defect, shape of the defect and the proximity to free margins an epidermal autograft was deemed most appropriate to repair the remaining defect.  The graft was trimmed to fit the size of the remaining defect.  The graft was then placed in the primary defect, oriented appropriately, and sutured into place. Rotation Flap Text: The defect edges were debeveled with a #15 scalpel blade.  Given the location of the defect, shape of the defect and the proximity to free margins a rotation flap was deemed most appropriate.  Using a sterile surgical marker, an appropriate rotation flap was drawn incorporating the defect and placing the expected incisions within the relaxed skin tension lines where possible.    The area thus outlined was incised deep to adipose tissue with a #15 scalpel blade.  The skin margins were undermined to an appropriate distance in all directions utilizing iris scissors. Complex Repair And O-L Flap Text: The defect edges were debeveled with a #15 scalpel blade.  The primary defect was closed partially with a complex linear closure.  Given the location of the remaining defect, shape of the defect and the proximity to free margins an O-L flap was deemed most appropriate for complete closure of the defect.  Using a sterile surgical marker, an appropriate flap was drawn incorporating the defect and placing the expected incisions within the relaxed skin tension lines where possible.    The area thus outlined was incised deep to adipose tissue with a #15 scalpel blade.  The skin margins were undermined to an appropriate distance in all directions utilizing iris scissors. Billing Type: Third-Party Bill Surgeon Performing The Repair (Optional): Daphne O-T Plasty Text: The defect edges were debeveled with a #15 scalpel blade.  Given the location of the defect, shape of the defect and the proximity to free margins an O-T plasty was deemed most appropriate.  Using a sterile surgical marker, an appropriate O-T plasty was drawn incorporating the defect and placing the expected incisions within the relaxed skin tension lines where possible.    The area thus outlined was incised deep to adipose tissue with a #15 scalpel blade.  The skin margins were undermined to an appropriate distance in all directions utilizing iris scissors. H Plasty Text: Given the location of the defect, shape of the defect and the proximity to free margins a H-plasty was deemed most appropriate for repair.  Using a sterile surgical marker, the appropriate advancement arms of the H-plasty were drawn incorporating the defect and placing the expected incisions within the relaxed skin tension lines where possible. The area thus outlined was incised deep to adipose tissue with a #15 scalpel blade. The skin margins were undermined to an appropriate distance in all directions utilizing iris scissors.  The opposing advancement arms were then advanced into place in opposite direction and anchored with interrupted buried subcutaneous sutures. Ftsg Text: The defect edges were debeveled with a #15 scalpel blade.  Given the location of the defect, shape of the defect and the proximity to free margins a full thickness skin graft was deemed most appropriate.  Using a sterile surgical marker, the primary defect shape was transferred to the donor site. The area thus outlined was incised deep to adipose tissue with a #15 scalpel blade.  The harvested graft was then trimmed of adipose tissue until only dermis and epidermis was left.  The skin margins of the secondary defect were undermined to an appropriate distance in all directions utilizing iris scissors.  The secondary defect was closed with interrupted buried subcutaneous sutures.  The skin edges were then re-apposed with running  sutures.  The skin graft was then placed in the primary defect and oriented appropriately. Interpolation Flap Text: A decision was made to reconstruct the defect utilizing an interpolation axial flap and a staged reconstruction.  A telfa template was made of the defect.  This telfa template was then used to outline the interpolation flap.  The donor area for the pedicle flap was then injected with anesthesia.  The flap was excised through the skin and subcutaneous tissue down to the layer of the underlying musculature.  The interpolation flap was carefully excised within this deep plane to maintain its blood supply.  The edges of the donor site were undermined.   The donor site was closed in a primary fashion.  The pedicle was then rotated into position and sutured.  Once the tube was sutured into place, adequate blood supply was confirmed with blanching and refill.  The pedicle was then wrapped with xeroform gauze and dressed appropriately with a telfa and gauze bandage to ensure continued blood supply and protect the attached pedicle. Length To Time In Minutes Device Was In Place: 10 Z Plasty Text: The lesion was extirpated to the level of the fat with a #15 scalpel blade.  Given the location of the defect, shape of the defect and the proximity to free margins a Z-plasty was deemed most appropriate for repair.  Using a sterile surgical marker, the appropriate transposition arms of the Z-plasty were drawn incorporating the defect and placing the expected incisions within the relaxed skin tension lines where possible.    The area thus outlined was incised deep to adipose tissue with a #15 scalpel blade.  The skin margins were undermined to an appropriate distance in all directions utilizing iris scissors.  The opposing transposition arms were then transposed into place in opposite direction and anchored with interrupted buried subcutaneous sutures. Dressing: pressure dressing Fusiform Excision Additional Text (Leave Blank If You Do Not Want): The margin was drawn around the clinically apparent lesion.  A fusiform shape was then drawn on the skin incorporating the lesion and margins.  Incisions were then made along these lines to the appropriate tissue plane and the lesion was extirpated. Banner Transposition Flap Text: The defect edges were debeveled with a #15 scalpel blade.  Given the location of the defect and the proximity to free margins a Banner transposition flap was deemed most appropriate.  Using a sterile surgical marker, an appropriate flap drawn around the defect. The area thus outlined was incised deep to adipose tissue with a #15 scalpel blade.  The skin margins were undermined to an appropriate distance in all directions utilizing iris scissors. Abbe Flap (Lower To Upper Lip) Text: The defect of the upper lip was assessed and measured.  Given the location and size of the defect, an Abbe flap was deemed most appropriate.  Using a sterile surgical marker, an appropriate Abbe flap was measured and drawn on the lower lip. Local anesthesia was then infiltrated. A scalpel was then used to incise the upper lip through and through the skin, vermilion, muscle and mucosa, leaving the flap pedicled on the opposite side.  The flap was then rotated and transferred to the lower lip defect.  The flap was then sutured into place with a three layer technique, closing the orbicularis oris muscle layer with subcutaneous buried sutures, followed by a mucosal layer and an epidermal layer. Double O-Z Flap Text: The defect edges were debeveled with a #15 scalpel blade.  Given the location of the defect, shape of the defect and the proximity to free margins a Double O-Z flap was deemed most appropriate.  Using a sterile surgical marker, an appropriate transposition flap was drawn incorporating the defect and placing the expected incisions within the relaxed skin tension lines where possible. The area thus outlined was incised deep to adipose tissue with a #15 scalpel blade.  The skin margins were undermined to an appropriate distance in all directions utilizing iris scissors. Nasal Turnover Hinge Flap Text: The defect edges were debeveled with a #15 scalpel blade.  Given the size, depth, location of the defect and the defect being full thickness a nasal turnover hinge flap was deemed most appropriate.  Using a sterile surgical marker, an appropriate hinge flap was drawn incorporating the defect. The area thus outlined was incised with a #15 scalpel blade. The flap was designed to recreate the nasal mucosal lining and the alar rim. The skin margins were undermined to an appropriate distance in all directions utilizing iris scissors. Rhombic Flap Text: The defect edges were debeveled with a #15 scalpel blade.  Given the location of the defect and the proximity to free margins a rhombic flap was deemed most appropriate.  Using a sterile surgical marker, an appropriate rhombic flap was drawn incorporating the defect.    The area thus outlined was incised deep to adipose tissue with a #15 scalpel blade.  The skin margins were undermined to an appropriate distance in all directions utilizing iris scissors. Partial Purse String (Simple) Text: Given the location of the defect and the characteristics of the surrounding skin a simple purse string closure was deemed most appropriate.  Undermining was performed circumferentially around the surgical defect.  A purse string suture was then placed and tightened. Wound tension of the circular defect prevented complete closure of the wound. Graft Donor Site Bandage (Optional-Leave Blank If You Don't Want In Note): Steri-strips and a pressure bandage were applied to the donor site. Hemigard Intro: Due to skin fragility and wound tension, it was decided to use HEMIGARD adhesive retention suture devices to permit a linear closure. The skin was cleaned and dried for a 6cm distance away from the wound. Excessive hair, if present, was removed to allow for adhesion. Complex Repair And Tissue Cultured Epidermal Autograft Text: The defect edges were debeveled with a #15 scalpel blade.  The primary defect was closed partially with a complex linear closure.  Given the location of the defect, shape of the defect and the proximity to free margins an tissue cultured epidermal autograft was deemed most appropriate to repair the remaining defect.  The graft was trimmed to fit the size of the remaining defect.  The graft was then placed in the primary defect, oriented appropriately, and sutured into place. Advancement Flap (Single) Text: The defect edges were debeveled with a #15 scalpel blade.  Given the location of the defect and the proximity to free margins a single advancement flap was deemed most appropriate.  Using a sterile surgical marker, an appropriate advancement flap was drawn incorporating the defect and placing the expected incisions within the relaxed skin tension lines where possible.    The area thus outlined was incised deep to adipose tissue with a #15 scalpel blade.  The skin margins were undermined to an appropriate distance in all directions utilizing iris scissors. V-Y Flap Text: The defect edges were debeveled with a #15 scalpel blade.  Given the location of the defect, shape of the defect and the proximity to free margins a V-Y flap was deemed most appropriate.  Using a sterile surgical marker, an appropriate advancement flap was drawn incorporating the defect and placing the expected incisions within the relaxed skin tension lines where possible.    The area thus outlined was incised deep to adipose tissue with a #15 scalpel blade.  The skin margins were undermined to an appropriate distance in all directions utilizing iris scissors. Suturegard Intro: Intraoperative tissue expansion was performed, utilizing the SUTUREGARD device, in order to reduce wound tension. Complex Repair And Modified Advancement Flap Text: The defect edges were debeveled with a #15 scalpel blade.  The primary defect was closed partially with a complex linear closure.  Given the location of the remaining defect, shape of the defect and the proximity to free margins a modified advancement flap was deemed most appropriate for complete closure of the defect.  Using a sterile surgical marker, an appropriate advancement flap was drawn incorporating the defect and placing the expected incisions within the relaxed skin tension lines where possible.    The area thus outlined was incised deep to adipose tissue with a #15 scalpel blade.  The skin margins were undermined to an appropriate distance in all directions utilizing iris scissors. Deep Sutures: 3-0 Vicryl Additional Anesthesia Volume In Cc: 6 Trilobed Flap Text: The defect edges were debeveled with a #15 scalpel blade.  Given the location of the defect and the proximity to free margins a trilobed flap was deemed most appropriate.  Using a sterile surgical marker, an appropriate trilobed flap drawn around the defect.    The area thus outlined was incised deep to adipose tissue with a #15 scalpel blade.  The skin margins were undermined to an appropriate distance in all directions utilizing iris scissors. Lip Wedge Excision Repair Text: Given the location of the defect and the proximity to free margins a full thickness wedge repair was deemed most appropriate.  Using a sterile surgical marker, the appropriate repair was drawn incorporating the defect and placing the expected incisions perpendicular to the vermilion border.  The vermilion border was also meticulously outlined to ensure appropriate reapproximation during the repair.  The area thus outlined was incised through and through with a #15 scalpel blade.  The muscularis and dermis were reaproximated with deep sutures following hemostasis. Care was taken to realign the vermilion border before proceeding with the superficial closure.  Once the vermilion was realigned the superfical and mucosal closure was finished. Complex Repair And Rhombic Flap Text: The defect edges were debeveled with a #15 scalpel blade.  The primary defect was closed partially with a complex linear closure.  Given the location of the remaining defect, shape of the defect and the proximity to free margins a rhombic flap was deemed most appropriate for complete closure of the defect.  Using a sterile surgical marker, an appropriate advancement flap was drawn incorporating the defect and placing the expected incisions within the relaxed skin tension lines where possible.    The area thus outlined was incised deep to adipose tissue with a #15 scalpel blade.  The skin margins were undermined to an appropriate distance in all directions utilizing iris scissors. Consent was obtained from the patient. The risks and benefits to therapy were discussed in detail. Specifically, the risks of infection, scarring, bleeding, prolonged wound healing, incomplete removal, allergy to anesthesia, nerve injury and recurrence were addressed. Prior to the procedure, the treatment site was clearly identified and confirmed by the patient. All components of Universal Protocol/PAUSE Rule completed. Purse String (Simple) Text: Given the location of the defect and the characteristics of the surrounding skin a purse string simple closure was deemed most appropriate.  Undermining was performed circumferentially around the surgical defect.  A purse string suture was then placed and tightened. Tissue Cultured Epidermal Autograft Text: The defect edges were debeveled with a #15 scalpel blade.  Given the location of the defect, shape of the defect and the proximity to free margins a tissue cultured epidermal autograft was deemed most appropriate.  The graft was then trimmed to fit the size of the defect.  The graft was then placed in the primary defect and oriented appropriately. Information: Selecting Yes will display possible errors in your note based on the variables you have selected. This validation is only offered as a suggestion for you. PLEASE NOTE THAT THE VALIDATION TEXT WILL BE REMOVED WHEN YOU FINALIZE YOUR NOTE. IF YOU WANT TO FAX A PRELIMINARY NOTE YOU WILL NEED TO TOGGLE THIS TO 'NO' IF YOU DO NOT WANT IT IN YOUR FAXED NOTE. Mastoid Interpolation Flap Text: A decision was made to reconstruct the defect utilizing an interpolation axial flap and a staged reconstruction.  A telfa template was made of the defect.  This telfa template was then used to outline the mastoid interpolation flap.  The donor area for the pedicle flap was then injected with anesthesia.  The flap was excised through the skin and subcutaneous tissue down to the layer of the underlying musculature.  The pedicle flap was carefully excised within this deep plane to maintain its blood supply.  The edges of the donor site were undermined.   The donor site was closed in a primary fashion.  The pedicle was then rotated into position and sutured.  Once the tube was sutured into place, adequate blood supply was confirmed with blanching and refill.  The pedicle was then wrapped with xeroform gauze and dressed appropriately with a telfa and gauze bandage to ensure continued blood supply and protect the attached pedicle. Modified Advancement Flap Text: The defect edges were debeveled with a #15 scalpel blade.  Given the location of the defect, shape of the defect and the proximity to free margins a modified advancement flap was deemed most appropriate.  Using a sterile surgical marker, an appropriate advancement flap was drawn incorporating the defect and placing the expected incisions within the relaxed skin tension lines where possible.    The area thus outlined was incised deep to adipose tissue with a #15 scalpel blade.  The skin margins were undermined to an appropriate distance in all directions utilizing iris scissors. Lab: [id number] Anesthesia Type: 1% lidocaine with epinephrine W Plasty Text: The lesion was extirpated to the level of the fat with a #15 scalpel blade.  Given the location of the defect, shape of the defect and the proximity to free margins a W-plasty was deemed most appropriate for repair.  Using a sterile surgical marker, the appropriate transposition arms of the W-plasty were drawn incorporating the defect and placing the expected incisions within the relaxed skin tension lines where possible.    The area thus outlined was incised deep to adipose tissue with a #15 scalpel blade.  The skin margins were undermined to an appropriate distance in all directions utilizing iris scissors.  The opposing transposition arms were then transposed into place in opposite direction and anchored with interrupted buried subcutaneous sutures. Partial Purse String (Intermediate) Text: Given the location of the defect and the characteristics of the surrounding skin an intermediate purse string closure was deemed most appropriate.  Undermining was performed circumferentially around the surgical defect.  A purse string suture was then placed and tightened. Wound tension of the circular defect prevented complete closure of the wound. Estimated Blood Loss (Cc): minimal Bilateral Helical Rim Advancement Flap Text: The defect edges were debeveled with a #15 blade scalpel.  Given the location of the defect and the proximity to free margins (helical rim) a bilateral helical rim advancement flap was deemed most appropriate.  Using a sterile surgical marker, the appropriate advancement flaps were drawn incorporating the defect and placing the expected incisions between the helical rim and antihelix where possible.  The area thus outlined was incised through and through with a #15 scalpel blade.  With a skin hook and iris scissors, the flaps were gently and sharply undermined and freed up. Epidermal Closure Graft Donor Site (Optional): simple interrupted Complex Repair And Double Advancement Flap Text: The defect edges were debeveled with a #15 scalpel blade.  The primary defect was closed partially with a complex linear closure.  Given the location of the remaining defect, shape of the defect and the proximity to free margins a double advancement flap was deemed most appropriate for complete closure of the defect.  Using a sterile surgical marker, an appropriate advancement flap was drawn incorporating the defect and placing the expected incisions within the relaxed skin tension lines where possible.    The area thus outlined was incised deep to adipose tissue with a #15 scalpel blade.  The skin margins were undermined to an appropriate distance in all directions utilizing iris scissors. Elliptical Excision Additional Text (Leave Blank If You Do Not Want): The margin was drawn around the clinically apparent lesion.  An elliptical shape was then drawn on the skin incorporating the lesion and margins.  Incisions were then made along these lines to the appropriate tissue plane and the lesion was extirpated. Zygomaticofacial Flap Text: Given the location of the defect, shape of the defect and the proximity to free margins a zygomaticofacial flap was deemed most appropriate for repair.  Using a sterile surgical marker, the appropriate flap was drawn incorporating the defect and placing the expected incisions within the relaxed skin tension lines where possible. The area thus outlined was incised deep to adipose tissue with a #15 scalpel blade with preservation of a vascular pedicle.  The skin margins were undermined to an appropriate distance in all directions utilizing iris scissors.  The flap was then placed into the defect and anchored with interrupted buried subcutaneous sutures. Complex Repair And Bilobe Flap Text: The defect edges were debeveled with a #15 scalpel blade.  The primary defect was closed partially with a complex linear closure.  Given the location of the remaining defect, shape of the defect and the proximity to free margins a bilobe flap was deemed most appropriate for complete closure of the defect.  Using a sterile surgical marker, an appropriate advancement flap was drawn incorporating the defect and placing the expected incisions within the relaxed skin tension lines where possible.    The area thus outlined was incised deep to adipose tissue with a #15 scalpel blade.  The skin margins were undermined to an appropriate distance in all directions utilizing iris scissors. Excisional Biopsy Additional Text (Leave Blank If You Do Not Want): The margin was drawn around the clinically apparent lesion. An elliptical shape was then drawn on the skin incorporating the lesion and margins.  Incisions were then made along these lines to the appropriate tissue plane and the lesion was extirpated. Adjacent Tissue Transfer Text: The defect edges were debeveled with a #15 scalpel blade.  Given the location of the defect and the proximity to free margins an adjacent tissue transfer was deemed most appropriate.  Using a sterile surgical marker, an appropriate flap was drawn incorporating the defect and placing the expected incisions within the relaxed skin tension lines where possible.    The area thus outlined was incised deep to adipose tissue with a #15 scalpel blade.  The skin margins were undermined to an appropriate distance in all directions utilizing iris scissors. Cheek Interpolation Flap Text: A decision was made to reconstruct the defect utilizing an interpolation axial flap and a staged reconstruction.  A telfa template was made of the defect.  This telfa template was then used to outline the Cheek Interpolation flap.  The donor area for the pedicle flap was then injected with anesthesia.  The flap was excised through the skin and subcutaneous tissue down to the layer of the underlying musculature.  The interpolation flap was carefully excised within this deep plane to maintain its blood supply.  The edges of the donor site were undermined.   The donor site was closed in a primary fashion.  The pedicle was then rotated into position and sutured.  Once the tube was sutured into place, adequate blood supply was confirmed with blanching and refill.  The pedicle was then wrapped with xeroform gauze and dressed appropriately with a telfa and gauze bandage to ensure continued blood supply and protect the attached pedicle. Skin Substitute Text: The defect edges were debeveled with a #15 scalpel blade.  Given the location of the defect, shape of the defect and the proximity to free margins a skin substitute graft was deemed most appropriate.  The graft material was trimmed to fit the size of the defect. The graft was then placed in the primary defect and oriented appropriately. Complex Repair And V-Y Plasty Text: The defect edges were debeveled with a #15 scalpel blade.  The primary defect was closed partially with a complex linear closure.  Given the location of the remaining defect, shape of the defect and the proximity to free margins a V-Y plasty was deemed most appropriate for complete closure of the defect.  Using a sterile surgical marker, an appropriate advancement flap was drawn incorporating the defect and placing the expected incisions within the relaxed skin tension lines where possible.    The area thus outlined was incised deep to adipose tissue with a #15 scalpel blade.  The skin margins were undermined to an appropriate distance in all directions utilizing iris scissors. Dorsal Nasal Flap Text: The defect edges were debeveled with a #15 scalpel blade.  Given the location of the defect and the proximity to free margins a dorsal nasal flap was deemed most appropriate.  Using a sterile surgical marker, an appropriate dorsal nasal flap was drawn around the defect.    The area thus outlined was incised deep to adipose tissue with a #15 scalpel blade.  The skin margins were undermined to an appropriate distance in all directions utilizing iris scissors. Complex Repair And A-T Advancement Flap Text: The defect edges were debeveled with a #15 scalpel blade.  The primary defect was closed partially with a complex linear closure.  Given the location of the remaining defect, shape of the defect and the proximity to free margins an A-T advancement flap was deemed most appropriate for complete closure of the defect.  Using a sterile surgical marker, an appropriate advancement flap was drawn incorporating the defect and placing the expected incisions within the relaxed skin tension lines where possible.    The area thus outlined was incised deep to adipose tissue with a #15 scalpel blade.  The skin margins were undermined to an appropriate distance in all directions utilizing iris scissors. Debridement Text: The wound edges were debrided prior to proceeding with the closure to facilitate wound healing. Complex Repair And Ftsg Text: The defect edges were debeveled with a #15 scalpel blade.  The primary defect was closed partially with a complex linear closure.  Given the location of the defect, shape of the defect and the proximity to free margins a full thickness skin graft was deemed most appropriate to repair the remaining defect.  The graft was trimmed to fit the size of the remaining defect.  The graft was then placed in the primary defect, oriented appropriately, and sutured into place. Advancement Flap (Double) Text: The defect edges were debeveled with a #15 scalpel blade.  Given the location of the defect and the proximity to free margins a double advancement flap was deemed most appropriate.  Using a sterile surgical marker, the appropriate advancement flaps were drawn incorporating the defect and placing the expected incisions within the relaxed skin tension lines where possible.    The area thus outlined was incised deep to adipose tissue with a #15 scalpel blade.  The skin margins were undermined to an appropriate distance in all directions utilizing iris scissors. Complex Repair And O-T Advancement Flap Text: The defect edges were debeveled with a #15 scalpel blade.  The primary defect was closed partially with a complex linear closure.  Given the location of the remaining defect, shape of the defect and the proximity to free margins an O-T advancement flap was deemed most appropriate for complete closure of the defect.  Using a sterile surgical marker, an appropriate advancement flap was drawn incorporating the defect and placing the expected incisions within the relaxed skin tension lines where possible.    The area thus outlined was incised deep to adipose tissue with a #15 scalpel blade.  The skin margins were undermined to an appropriate distance in all directions utilizing iris scissors. Anesthesia Volume In Cc: 2.5 Undermining Type: Entire Wound Size Of Margin In Cm: 0.3 Estlander Flap (Upper To Lower Lip) Text: The defect of the lower lip was assessed and measured.  Given the location and size of the defect, an Estlander flap was deemed most appropriate.  Using a sterile surgical marker, an appropriate Estlander flap was measured and drawn on the upper lip. Local anesthesia was then infiltrated. A scalpel was then used to incise the lateral aspect of the flap, through skin, muscle and mucosa, leaving the flap pedicled medially.  The flap was then rotated and positioned to fill the lower lip defect.  The flap was then sutured into place with a three layer technique, closing the orbicularis oris muscle layer with subcutaneous buried sutures, followed by a mucosal layer and an epidermal layer. No Repair - Repaired With Adjacent Surgical Defect Text (Leave Blank If You Do Not Want): After the excision the defect was repaired concurrently with another surgical defect which was in close approximation. Bilobed Transposition Flap Text: The defect edges were debeveled with a #15 scalpel blade.  Given the location of the defect and the proximity to free margins a bilobed transposition flap was deemed most appropriate.  Using a sterile surgical marker, an appropriate bilobe flap drawn around the defect.    The area thus outlined was incised deep to adipose tissue with a #15 scalpel blade.  The skin margins were undermined to an appropriate distance in all directions utilizing iris scissors. Bi-Rhombic Flap Text: The defect edges were debeveled with a #15 scalpel blade.  Given the location of the defect and the proximity to free margins a bi-rhombic flap was deemed most appropriate.  Using a sterile surgical marker, an appropriate rhombic flap was drawn incorporating the defect. The area thus outlined was incised deep to adipose tissue with a #15 scalpel blade.  The skin margins were undermined to an appropriate distance in all directions utilizing iris scissors. Lab Facility: [id number] Transposition Flap Text: The defect edges were debeveled with a #15 scalpel blade.  Given the location of the defect and the proximity to free margins a transposition flap was deemed most appropriate.  Using a sterile surgical marker, an appropriate transposition flap was drawn incorporating the defect.    The area thus outlined was incised deep to adipose tissue with a #15 scalpel blade.  The skin margins were undermined to an appropriate distance in all directions utilizing iris scissors. Complex Repair And Dermal Autograft Text: The defect edges were debeveled with a #15 scalpel blade.  The primary defect was closed partially with a complex linear closure.  Given the location of the defect, shape of the defect and the proximity to free margins an dermal autograft was deemed most appropriate to repair the remaining defect.  The graft was trimmed to fit the size of the remaining defect.  The graft was then placed in the primary defect, oriented appropriately, and sutured into place. Epidermal Autograft Text: The defect edges were debeveled with a #15 scalpel blade.  Given the location of the defect, shape of the defect and the proximity to free margins an epidermal autograft was deemed most appropriate.  Using a sterile surgical marker, the primary defect shape was transferred to the donor site. The epidermal graft was then harvested.  The skin graft was then placed in the primary defect and oriented appropriately. Peng Advancement Flap Text: The defect edges were debeveled with a #15 scalpel blade.  Given the location of the defect, shape of the defect and the proximity to free margins a Peng advancement flap was deemed most appropriate.  Using a sterile surgical marker, an appropriate advancement flap was drawn incorporating the defect and placing the expected incisions within the relaxed skin tension lines where possible. The area thus outlined was incised deep to adipose tissue with a #15 scalpel blade.  The skin margins were undermined to an appropriate distance in all directions utilizing iris scissors. Wound Care: Aquaphor Muscle Hinge Flap Text: The defect edges were debeveled with a #15 scalpel blade.  Given the size, depth and location of the defect and the proximity to free margins a muscle hinge flap was deemed most appropriate.  Using a sterile surgical marker, an appropriate hinge flap was drawn incorporating the defect. The area thus outlined was incised with a #15 scalpel blade.  The skin margins were undermined to an appropriate distance in all directions utilizing iris scissors. Nostril Rim Text: The closure involved the nostril rim. Repair Performed By Another Provider Text (Leave Blank If You Do Not Want): After the tissue was excised the defect was repaired by another provider. O-Z Plasty Text: The defect edges were debeveled with a #15 scalpel blade.  Given the location of the defect, shape of the defect and the proximity to free margins an O-Z plasty (double transposition flap) was deemed most appropriate.  Using a sterile surgical marker, the appropriate transposition flaps were drawn incorporating the defect and placing the expected incisions within the relaxed skin tension lines where possible.    The area thus outlined was incised deep to adipose tissue with a #15 scalpel blade.  The skin margins were undermined to an appropriate distance in all directions utilizing iris scissors.  Hemostasis was achieved with electrocautery.  The flaps were then transposed into place, one clockwise and the other counterclockwise, and anchored with interrupted buried subcutaneous sutures. Excision Method: Elliptical Eye Clamp Note Details: An eye clamp was used during the procedure. Complex Repair And Single Advancement Flap Text: The defect edges were debeveled with a #15 scalpel blade.  The primary defect was closed partially with a complex linear closure.  Given the location of the remaining defect, shape of the defect and the proximity to free margins a single advancement flap was deemed most appropriate for complete closure of the defect.  Using a sterile surgical marker, an appropriate advancement flap was drawn incorporating the defect and placing the expected incisions within the relaxed skin tension lines where possible.    The area thus outlined was incised deep to adipose tissue with a #15 scalpel blade.  The skin margins were undermined to an appropriate distance in all directions utilizing iris scissors. Purse String (Intermediate) Text: Given the location of the defect and the characteristics of the surrounding skin a purse string intermediate closure was deemed most appropriate.  Undermining was performed circumferentially around the surgical defect.  A purse string suture was then placed and tightened. Retention Suture Text: Retention sutures were placed to support the closure and prevent dehiscence. O-L Flap Text: The defect edges were debeveled with a #15 scalpel blade.  Given the location of the defect, shape of the defect and the proximity to free margins an O-L flap was deemed most appropriate.  Using a sterile surgical marker, an appropriate advancement flap was drawn incorporating the defect and placing the expected incisions within the relaxed skin tension lines where possible.    The area thus outlined was incised deep to adipose tissue with a #15 scalpel blade.  The skin margins were undermined to an appropriate distance in all directions utilizing iris scissors. Perilesional Excision Additional Text (Leave Blank If You Do Not Want): The margin was drawn around the clinically apparent lesion. Incisions were then made along these lines to the appropriate tissue plane and the lesion was extirpated. Helical Rim Text: The closure involved the helical rim. Complex Repair And Rotation Flap Text: The defect edges were debeveled with a #15 scalpel blade.  The primary defect was closed partially with a complex linear closure.  Given the location of the remaining defect, shape of the defect and the proximity to free margins a rotation flap was deemed most appropriate for complete closure of the defect.  Using a sterile surgical marker, an appropriate advancement flap was drawn incorporating the defect and placing the expected incisions within the relaxed skin tension lines where possible.    The area thus outlined was incised deep to adipose tissue with a #15 scalpel blade.  The skin margins were undermined to an appropriate distance in all directions utilizing iris scissors. Melolabial Interpolation Flap Text: A decision was made to reconstruct the defect utilizing an interpolation axial flap and a staged reconstruction.  A telfa template was made of the defect.  This telfa template was then used to outline the melolabial interpolation flap.  The donor area for the pedicle flap was then injected with anesthesia.  The flap was excised through the skin and subcutaneous tissue down to the layer of the underlying musculature.  The pedicle flap was carefully excised within this deep plane to maintain its blood supply.  The edges of the donor site were undermined.   The donor site was closed in a primary fashion.  The pedicle was then rotated into position and sutured.  Once the tube was sutured into place, adequate blood supply was confirmed with blanching and refill.  The pedicle was then wrapped with xeroform gauze and dressed appropriately with a telfa and gauze bandage to ensure continued blood supply and protect the attached pedicle. Positioning (Leave Blank If You Do Not Want): The patient was placed in a comfortable position exposing the surgical site. Rhomboid Transposition Flap Text: The defect edges were debeveled with a #15 scalpel blade.  Given the location of the defect and the proximity to free margins a rhomboid transposition flap was deemed most appropriate.  Using a sterile surgical marker, an appropriate rhomboid flap was drawn incorporating the defect.    The area thus outlined was incised deep to adipose tissue with a #15 scalpel blade.  The skin margins were undermined to an appropriate distance in all directions utilizing iris scissors. Cheek-To-Nose Interpolation Flap Text: A decision was made to reconstruct the defect utilizing an interpolation axial flap and a staged reconstruction.  A telfa template was made of the defect.  This telfa template was then used to outline the Cheek-To-Nose Interpolation flap.  The donor area for the pedicle flap was then injected with anesthesia.  The flap was excised through the skin and subcutaneous tissue down to the layer of the underlying musculature.  The interpolation flap was carefully excised within this deep plane to maintain its blood supply.  The edges of the donor site were undermined.   The donor site was closed in a primary fashion.  The pedicle was then rotated into position and sutured.  Once the tube was sutured into place, adequate blood supply was confirmed with blanching and refill.  The pedicle was then wrapped with xeroform gauze and dressed appropriately with a telfa and gauze bandage to ensure continued blood supply and protect the attached pedicle. Post-Care Instructions: I reviewed with the patient in detail post-care instructions. Patient is not to engage in any heavy lifting, exercise, or swimming for the next 14 days. Should the patient develop any fevers, chills, bleeding, severe pain patient will contact the office immediately. Dermal Autograft Text: The defect edges were debeveled with a #15 scalpel blade.  Given the location of the defect, shape of the defect and the proximity to free margins a dermal autograft was deemed most appropriate.  Using a sterile surgical marker, the primary defect shape was transferred to the donor site. The area thus outlined was incised deep to adipose tissue with a #15 scalpel blade.  The harvested graft was then trimmed of adipose and epidermal tissue until only dermis was left.  The skin graft was then placed in the primary defect and oriented appropriately. Complex Repair And Double M Plasty Text: The defect edges were debeveled with a #15 scalpel blade.  The primary defect was closed partially with a complex linear closure.  Given the location of the remaining defect, shape of the defect and the proximity to free margins a double M plasty was deemed most appropriate for complete closure of the defect.  Using a sterile surgical marker, an appropriate advancement flap was drawn incorporating the defect and placing the expected incisions within the relaxed skin tension lines where possible.    The area thus outlined was incised deep to adipose tissue with a #15 scalpel blade.  The skin margins were undermined to an appropriate distance in all directions utilizing iris scissors. Alar Island Pedicle Flap Text: The defect edges were debeveled with a #15 scalpel blade.  Given the location of the defect, shape of the defect and the proximity to the alar rim an island pedicle advancement flap was deemed most appropriate.  Using a sterile surgical marker, an appropriate advancement flap was drawn incorporating the defect, outlining the appropriate donor tissue and placing the expected incisions within the nasal ala running parallel to the alar rim. The area thus outlined was incised with a #15 scalpel blade.  The skin margins were undermined minimally to an appropriate distance in all directions around the primary defect and laterally outward around the island pedicle utilizing iris scissors.  There was minimal undermining beneath the pedicle flap. Epidermal Sutures: 4-0 Ethilon Pre-Excision Curettage Text (Leave Blank If You Do Not Want): Prior to drawing the surgical margin the visible lesion was removed with electrodesiccation and curettage to clearly define the lesion size. Suture Removal: 10 days Chonodrocutaneous Helical Advancement Flap Text: The defect edges were debeveled with a #15 scalpel blade.  Given the location of the defect and the proximity to free margins a chondrocutaneous helical advancement flap was deemed most appropriate.  Using a sterile surgical marker, the appropriate advancement flap was drawn incorporating the defect and placing the expected incisions within the relaxed skin tension lines where possible.    The area thus outlined was incised deep to adipose tissue with a #15 scalpel blade.  The skin margins were undermined to an appropriate distance in all directions utilizing iris scissors. Saucerization Excision Additional Text (Leave Blank If You Do Not Want): The margin was drawn around the clinically apparent lesion.  Incisions were then made along these lines, in a tangential fashion, to the appropriate tissue plane and the lesion was extirpated. Mercedes Flap Text: The defect edges were debeveled with a #15 scalpel blade.  Given the location of the defect, shape of the defect and the proximity to free margins a Mercedes flap was deemed most appropriate.  Using a sterile surgical marker, an appropriate advancement flap was drawn incorporating the defect and placing the expected incisions within the relaxed skin tension lines where possible. The area thus outlined was incised deep to adipose tissue with a #15 scalpel blade.  The skin margins were undermined to an appropriate distance in all directions utilizing iris scissors. O-T Advancement Flap Text: The defect edges were debeveled with a #15 scalpel blade.  Given the location of the defect, shape of the defect and the proximity to free margins an O-T advancement flap was deemed most appropriate.  Using a sterile surgical marker, an appropriate advancement flap was drawn incorporating the defect and placing the expected incisions within the relaxed skin tension lines where possible.    The area thus outlined was incised deep to adipose tissue with a #15 scalpel blade.  The skin margins were undermined to an appropriate distance in all directions utilizing iris scissors. Complex Repair And M Plasty Text: The defect edges were debeveled with a #15 scalpel blade.  The primary defect was closed partially with a complex linear closure.  Given the location of the remaining defect, shape of the defect and the proximity to free margins an M plasty was deemed most appropriate for complete closure of the defect.  Using a sterile surgical marker, an appropriate advancement flap was drawn incorporating the defect and placing the expected incisions within the relaxed skin tension lines where possible.    The area thus outlined was incised deep to adipose tissue with a #15 scalpel blade.  The skin margins were undermined to an appropriate distance in all directions utilizing iris scissors. Island Pedicle Flap-Requiring Vessel Identification Text: The defect edges were debeveled with a #15 scalpel blade.  Given the location of the defect, shape of the defect and the proximity to free margins an island pedicle advancement flap was deemed most appropriate.  Using a sterile surgical marker, an appropriate advancement flap was drawn, based on the axial vessel mentioned above, incorporating the defect, outlining the appropriate donor tissue and placing the expected incisions within the relaxed skin tension lines where possible.    The area thus outlined was incised deep to adipose tissue with a #15 scalpel blade.  The skin margins were undermined to an appropriate distance in all directions around the primary defect and laterally outward around the island pedicle utilizing iris scissors.  There was minimal undermining beneath the pedicle flap. Complex Repair And W Plasty Text: The defect edges were debeveled with a #15 scalpel blade.  The primary defect was closed partially with a complex linear closure.  Given the location of the remaining defect, shape of the defect and the proximity to free margins a W plasty was deemed most appropriate for complete closure of the defect.  Using a sterile surgical marker, an appropriate advancement flap was drawn incorporating the defect and placing the expected incisions within the relaxed skin tension lines where possible.    The area thus outlined was incised deep to adipose tissue with a #15 scalpel blade.  The skin margins were undermined to an appropriate distance in all directions utilizing iris scissors. Double Island Pedicle Flap Text: The defect edges were debeveled with a #15 scalpel blade.  Given the location of the defect, shape of the defect and the proximity to free margins a double island pedicle advancement flap was deemed most appropriate.  Using a sterile surgical marker, an appropriate advancement flap was drawn incorporating the defect, outlining the appropriate donor tissue and placing the expected incisions within the relaxed skin tension lines where possible.    The area thus outlined was incised deep to adipose tissue with a #15 scalpel blade.  The skin margins were undermined to an appropriate distance in all directions around the primary defect and laterally outward around the island pedicle utilizing iris scissors.  There was minimal undermining beneath the pedicle flap. Home Suture Removal Text: Patient was provided a home suture removal kit and will remove their sutures at home.  If they have any questions or difficulties they will call the office. Paramedian Forehead Flap Text: A decision was made to reconstruct the defect utilizing an interpolation axial flap and a staged reconstruction.  A telfa template was made of the defect.  This telfa template was then used to outline the paramedian forehead pedicle flap.  The donor area for the pedicle flap was then injected with anesthesia.  The flap was excised through the skin and subcutaneous tissue down to the layer of the underlying musculature.  The pedicle flap was carefully excised within this deep plane to maintain its blood supply.  The edges of the donor site were undermined.   The donor site was closed in a primary fashion.  The pedicle was then rotated into position and sutured.  Once the tube was sutured into place, adequate blood supply was confirmed with blanching and refill.  The pedicle was then wrapped with xeroform gauze and dressed appropriately with a telfa and gauze bandage to ensure continued blood supply and protect the attached pedicle. Hemigard Postcare Instructions: The HEMIGARD strips are to remain completely dry for at least 5-7 days. Xenograft Text: The defect edges were debeveled with a #15 scalpel blade.  Given the location of the defect, shape of the defect and the proximity to free margins a xenograft was deemed most appropriate.  The graft was then trimmed to fit the size of the defect.  The graft was then placed in the primary defect and oriented appropriately. Saucerization Depth: dermis and superficial adipose tissue Scalpel Size: 15 blade Nasalis Myocutaneous Flap Text: Using a #15 blade, an incision was made around the donor flap to the level of the nasalis muscle. Wide lateral undermining was then performed in both the subcutaneous plane above the nasalis muscle, and in a submuscular plane just above periosteum. This allowed the formation of a free nasalis muscle axial pedicle which was still attached to the actual cutaneous flap, increasing its mobility and vascular viability. Hemostasis was obtained with pinpoint electrocoagulation. The flap was mobilized into position and the pivotal anchor points positioned and stabilized with buried interrupted sutures. Subcutaneous and dermal tissues were closed in a multilayered fashion with sutures. Tissue redundancies were excised, and the epidermal edges were apposed without significant tension and sutured with sutures. Double Z Plasty Text: The lesion was extirpated to the level of the fat with a #15 scalpel blade. Given the location of the defect, shape of the defect and the proximity to free margins a double Z-plasty was deemed most appropriate for repair. Using a sterile surgical marker, the appropriate transposition arms of the double Z-plasty were drawn incorporating the defect and placing the expected incisions within the relaxed skin tension lines where possible. The area thus outlined was incised deep to adipose tissue with a #15 scalpel blade. The skin margins were undermined to an appropriate distance in all directions utilizing iris scissors. The opposing transposition arms were then transposed and carried over into place in opposite direction and anchored with interrupted buried subcutaneous sutures. Pinch Graft Text: The defect edges were debeveled with a #15 scalpel blade. Given the location of the defect, shape of the defect and the proximity to free margins a pinch graft was deemed most appropriate. Using a sterile surgical marker, the primary defect shape was transferred to the donor site. The area thus outlined was incised deep to adipose tissue with a #15 scalpel blade.  The harvested graft was then trimmed of adipose tissue until only dermis and epidermis was left. The skin graft was then placed in the primary defect and oriented appropriately. Why Was The Change Made?: Please Select the Appropriate Response Repair Depth: use same depth as excision depth Rectangular Flap Text: The defect edges were debeveled with a #15 scalpel blade. Given the location of the defect and the proximity to free margins a rectangular flap was deemed most appropriate. Using a sterile surgical marker, an appropriate rectangular flap was drawn incorporating the defect. The area thus outlined was incised deep to adipose tissue with a #15 scalpel blade. The skin margins were undermined to an appropriate distance in all directions utilizing iris scissors. Following this, the designed flap was carried over into the primary defect and sutured into place. Nasolabial Transposition Flap Text: The defect edges were debeveled with a #15 scalpel blade.  Given the size, depth and location of the defect and the proximity to free margins a nasolabial transposition flap was deemed most appropriate. Using a sterile surgical marker, an appropriate flap was drawn incorporating the defect. The area thus outlined was incised with a #15 scalpel blade. The skin margins were undermined to an appropriate distance in all directions utilizing iris scissors. Following this, the designed flap was carried into the primary defect and sutured into place. Bilateral Rotation Flap Text: The defect edges were debeveled with a #15 scalpel blade. Given the location of the defect, shape of the defect and the proximity to free margins a bilateral rotation flap was deemed most appropriate. Using a sterile surgical marker, an appropriate rotation flap was drawn incorporating the defect and placing the expected incisions within the relaxed skin tension lines where possible. The area thus outlined was incised deep to adipose tissue with a #15 scalpel blade. The skin margins were undermined to an appropriate distance in all directions utilizing iris scissors. Following this, the designed flap was carried over into the primary defect and sutured into place. Flip-Flop Flap Text: The defect edges were debeveled with a #15 blade scalpel.  Given the location of the defect and the proximity to free margins a flip-flop flap was deemed most appropriate. Using a sterile surgical marker, the appropriate flap was drawn incorporating the defect and placing the expected incisions between the helical rim and antihelix where possible.  The area thus outlined was incised through and through with a #15 scalpel blade. Following this, the designed flap was carried over into the primary defect and sutured into place.

## 2025-01-08 NOTE — ASU PATIENT PROFILE, ADULT - INTERNATIONAL TRAVEL
Patient:   SVEN RAJPUT            MRN: [id number]            FIN: [id number]               Age:   50 years     Sex:  MALE     :  63   Associated Diagnoses:   None   Author:   ISIDORO ESPINO      PCP: Jeanmarie    Chief Complaint:  Right leg swelling and redness    History of present illness:    Pt is a 50 y M with no PMH presenting with 3 weeks of right lower extremity warmth, swelling, and redness.  Pt states 3 weeks ago he woke up in the middle of the night with fever, chills, vomiting x4.  He woke up that morning with pain in his right calf and and significant right LE swelling.  Pt states the redness started a day later.  He denied any trauma or injuries, no bug bites, no recent travel, no camping, no swimming, no recent sexual history.      2 days after onset, pt went to [facility] where he was diagnosed with Right LE cellulitis.  Previous blood cultures at [facility] was negative x2.  He was treated with clindamycin for 2 days before being discharged on a 5 day course of PO clindamycin.  At that time, pt noticed significant itching of his right LE and peeling of his skin.  Pt notes that swelling got worse after starting clindamycin.  Pt was not complaining of significant pain at this time.    He followed up with his doctor 4 days after discharge.  He states the redness improved but he still had significant swelling.  Denies any other symptoms at that time.  His doctor gave him another 5 day course of clindamycin and a 10 day course of bactrim.       Pt seen in the ED this afternoon.  His only complaint is of lower leg swelling with redness.  Pt states the redness and swelling has worsen since taking his last dose of bactrim 7 days ago.  Pt denies pain, fever, chills, N/V, weakness, numbness, or any associated symptoms.         Review of Systems:  Constitutional: denies fever, chills, fatigue  Skin: Per HPI: right LEredness  Eyes: denies recent vision problems or eye pain     ENT: denies congestion, ear pain, or sore throat  Endocrine: denies thyroid problems    Cardiovascular: denies chest pain  Respiratory:  denies cough, shortness of breath, congestion, or wheezing  Gastrointestinal: denies nausea, no vomiting or diarrhea  Musculoskeletal: Per HPI: Right LE edema  Neurologic: No headache  Hematologic: No unusual bruising or bleeding  Psychiatric: No psychiatric problems, hallucinations or depression    Past medical history:   Denies PMH    Past surgical history:   Denies Surgical History    Family history:   Father passed at 57 from lung disease, chronic smoker  Mother passed at 81 from lung cancer, had throat cancer as well  Brother passed at 39 from heart attack    Social history:  Quit alcohol 16 months ago  Denies tobacco and ilicit drug use   for [employer].  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    Not sexually active for 10 years, before had a negative screening for STDs.      Home Meds:  no home meds      Allergies (1) Active Reaction  NKA    Vitals between:   07-AUG-2018 15:26:58   TO   08-AUG-2018 15:26:58                   LAST RESULT MINIMUM MAXIMUM  Temperature 37.2 37.2 37.2  Heart Rate 99 99 99  Resp Rate 16 16 16  NISBP           147 147 147  NIDBP           89 89 89  NIMBP           108 108 108  SpO2                100 100 100    Physical exam:  General: Pt is non-diaphoretic, no acute signs of distress.  Musculoskeletal: Pt right LE edema and erythema.  Edema and diffuse erythema exends from right toe to right knee.  Right LE appears close to twice the size of Left LE.  Right LE is warm and firm to touch, nontender.  No ulcers or skin lesions present.  2+ Distal pulses   Head:  normocephalic.  Eyes:  Anicertic, PERRLA, EOMI.  Ears, nose, throat: moist mucosa.  Neck: no lymphadenopathy.  Chest: lungs clear to auscultation bilaterally.  No cough,  crackles, wheezes.  Cardiac: Normal rate/rhythm, normal S1/S2.  No murmurs or extra heart sounds appreciated.    Abdomen: Soft, Non-tender to palpation.  No rebound tenderness or rigidity.  : No CVA tenderness.  Neuro: CN 2-12 grossly intact, Strength 5/5 bilaterally, Sensation to light touch 5/5 bilaterally.    Psychiatric: Oriented to person, place, and date.      Labs:      Labs between:  07-AUG-2018 15:26 to 08-AUG-2018 15:26    CBC:                 WBC  HgB  Hct  Plt  MCV  RDW   08-AUG-2018  7.5  13.6  40.6  327  89.2  13.0     DIFF:                 Seg  Neutroph//ABS  Lymph//ABS  Mono//ABS  EOS/ABS   08-AUG-2018 NOT APPLICABLE  57 // 4.3 27 // 2.0 13 // 0.9 2 // 0.2    BMP:                 Na  Cl  BUN  Glu   08-AUG-2018 138  104  18  90                              K  CO2  Cr  Ca                              3.8  25  0.99  9.4     CMP:                 AST  ALT  AlkPhos  Bili  Albumin   08-AUG-2018 24  31  72  0.5  4.0     Other Chem:             Mg  Phos  Triglycerides  GGTP  DirectBili     CRP 2.9                                     Result title:  VASC EXT LOWR VENOUS DPLX RT  Result status:  Final  Verified by:  ADALI LOU on 08/08/2018 4:53  IMPRESSION:No evidence for right lower extremity DVT.If symptoms persist or if clinically indicated could repeat ultrasound examination in 2-5 days.      Assessment/Plan:   50 y M with no PMH presenting with 3 weeks of right lower extremity warmth, swelling, and redness.  He denied any trauma or injuries, no bug bites, no recent travel, no camping, no swimming, no recent sexual history.  Pt was treated for cellulitis with clindamycin and vancomycin without improvement.    # Right LE warmth, edema, swelling  -etiology likely cellulitis  -Pt is stable, afebrile. WBC 7.5  -CRP 2.9  -LE doppler negative for DVT  -pending blood cultures  -Broad spectrum antibiotics  -start Vancomycin IV for MRSA   -start cefazolin 2000mg IV q8hr for skin otto    DVT prophylaxis  - lovenox SC 40mg daily   FEN: Replete electrolytes PRN.  Cardiac Diet.    René Mcmahon, MS4       Patient  seen and examined. History and physical exam as documented above has been confirmed. All pertinent laboratory and diagnostic test reviewed. Plan formulated with the team and my recommendations incorporated.    49 yo m with no past medical history  presenting RLE cellulitis of 3 weeks' duration. Symptoms started with redness, warmth and pain associated with a flu-like illness 3 weeks ago, he was admitted to [facility] for 2 days  where he received IV Clinda x 2 days and discharged on oral Clindmaycin for 5 more days, upon discharge he was seen by [facility] PCP twice given worsening of symptoms, PCP extended treatment to 5 more days of clinda and 10 days of bactrim. Today, he presented to this facility because of worsening of RLE edema and redness. Otherwise denies f,c,n/v, leg pain. He's able to bear weight. Denies trauma, insect bites, contact with salt/fresh water.     O: afebrile, ,147/89  On physical exam: General: A+Ox4, no signs of distress.CVS: Normal s1 and s2, no extra heart sounds, no murmurs appreciated, distal pulses palpated.Resp: No c/w/r. Skin/MSK: RLE looks erythematous starting from the foot and extending to the level of the knee. Knee looks swollen but no tenderness is appreciated troughout the leg. ROM is preserved although limited due to edema    Assessment  1. RLECellulitis. Failed outpatient antibiotic twice. Pt looks non-toxic, hemodynamically stable.  RLE dopplers negative for DVT.   Plan:  IV abx Vancomycin and Ancef. Will follow up Blood cx.  Rest per René's note.     Isidoro Springer  PGY-2 Internal Medicine  (contact through [employer])                   Electronically Signed On 08/08/2018 17:14  __________________________________________________   PALAK SALINAS, ISIDORO              Patient seen and examined. Discussed with team at bedside.   Agree with diagnosis and  management as outlined by Resident note. To get CT right leg  Dr. Arceo,  ,             Electronically Signed On 08/09/2018 12:12  __________________________________________________   DIANE POST     No

## 2025-01-08 NOTE — H&P CARDIOLOGY - NSICDXPASTMEDICALHX_GEN_ALL_CORE_FT
PAST MEDICAL HISTORY:  Anxiety and depression     CAD (coronary artery disease)     COVID-19     GERD (gastroesophageal reflux disease)     Hyperlipidemia     Hypertension     Myocardial infarction

## 2025-01-08 NOTE — CHART NOTE - NSCHARTNOTEFT_GEN_A_CORE
Removal of Femoral Sheath    Pulses in the right lower extremity are palpable. The patient was placed in the supine position. The insertion site was identified and the sutures were removed per protocol.  The 6 [place] femoral sheath was then removed and direct pressure was applied for  15 minutes by EVONNE Perdomo.    Monitoring of the right groin and both lower extremities including neuro-vascular checks and vital signs every 15 minutes x 4, then every 30 minutes x 2, then every 1 hour was ordered.    Complications: None    Comments:  Patient tolerated procedure well. Good hemostasis achieved post sheath pull. No acute distress noted.

## 2025-01-08 NOTE — ASU DISCHARGE PLAN (ADULT/PEDIATRIC) - CARE PROVIDER_API CALL
Arron Roman  Cardiology  [address] [place] [address], [facility] [address]  [place] [address]  Phone: [phone]  Fax: [phone]  Established Patient  Follow Up Time: 2 weeks

## 2025-01-16 NOTE — PATIENT PROFILE ADULT - NSPROEXTENSIONSOFSELF_GEN_A_NUR
Patient was received semi-supine in bed in NAD, grandson at bedside, +IV.
Patient received supine in bed, cooperative with physical therapy, no apparent complaints
none

## 2025-01-27 ENCOUNTER — NON-APPOINTMENT (OUTPATIENT)
Age: 50
End: 2025-01-27

## 2025-02-05 ENCOUNTER — APPOINTMENT (OUTPATIENT)
Dept: INTERNAL MEDICINE | Facility: CLINIC | Age: 50
End: 2025-02-05
Payer: MEDICAID

## 2025-02-05 VITALS
HEIGHT: 67 IN | OXYGEN SATURATION: 98 % | HEART RATE: 90 BPM | SYSTOLIC BLOOD PRESSURE: 116 MMHG | RESPIRATION RATE: 16 BRPM | DIASTOLIC BLOOD PRESSURE: 72 MMHG | BODY MASS INDEX: 29.82 KG/M2 | WEIGHT: 190 LBS

## 2025-02-05 DIAGNOSIS — I50.22 CHRONIC SYSTOLIC (CONGESTIVE) HEART FAILURE: ICD-10-CM

## 2025-02-05 DIAGNOSIS — Z95.5 PRESENCE OF CORONARY ANGIOPLASTY IMPLANT AND GRAFT: ICD-10-CM

## 2025-02-05 DIAGNOSIS — R07.9 CHEST PAIN, UNSPECIFIED: ICD-10-CM

## 2025-02-05 DIAGNOSIS — N83.209 UNSPECIFIED OVARIAN CYST, UNSPECIFIED SIDE: ICD-10-CM

## 2025-02-05 DIAGNOSIS — C43.59 MALIGNANT MELANOMA OF OTHER PART OF TRUNK: ICD-10-CM

## 2025-02-05 DIAGNOSIS — E78.5 HYPERLIPIDEMIA, UNSPECIFIED: ICD-10-CM

## 2025-02-05 DIAGNOSIS — M94.0 CHONDROCOSTAL JUNCTION SYNDROME [TIETZE]: ICD-10-CM

## 2025-02-05 DIAGNOSIS — I25.85 CHRONIC CORONARY MICROVASCULAR DYSFUNCTION: ICD-10-CM

## 2025-02-05 DIAGNOSIS — R59.0 LOCALIZED ENLARGED LYMPH NODES: ICD-10-CM

## 2025-02-05 DIAGNOSIS — I10 ESSENTIAL (PRIMARY) HYPERTENSION: ICD-10-CM

## 2025-02-05 DIAGNOSIS — E66.9 OBESITY, UNSPECIFIED: ICD-10-CM

## 2025-02-05 DIAGNOSIS — F41.9 ANXIETY DISORDER, UNSPECIFIED: ICD-10-CM

## 2025-02-05 DIAGNOSIS — I25.10 ATHEROSCLEROTIC HEART DISEASE OF NATIVE CORONARY ARTERY W/OUT ANGINA PECTORIS: ICD-10-CM

## 2025-02-05 DIAGNOSIS — G47.00 INSOMNIA, UNSPECIFIED: ICD-10-CM

## 2025-02-05 DIAGNOSIS — M17.10 UNILATERAL PRIMARY OSTEOARTHRITIS, UNSPECIFIED KNEE: ICD-10-CM

## 2025-02-05 DIAGNOSIS — R73.09 OTHER ABNORMAL GLUCOSE: ICD-10-CM

## 2025-02-05 PROCEDURE — 99214 OFFICE O/P EST MOD 30 MIN: CPT

## 2025-02-05 PROCEDURE — G2211 COMPLEX E/M VISIT ADD ON: CPT | Mod: NC

## 2025-02-10 ENCOUNTER — NON-APPOINTMENT (OUTPATIENT)
Age: 50
End: 2025-02-10

## 2025-03-04 ENCOUNTER — APPOINTMENT (OUTPATIENT)
Dept: GASTROENTEROLOGY | Facility: CLINIC | Age: 50
End: 2025-03-04
Payer: MEDICAID

## 2025-03-04 VITALS
WEIGHT: 190 LBS | DIASTOLIC BLOOD PRESSURE: 80 MMHG | TEMPERATURE: 97.5 F | BODY MASS INDEX: 29.82 KG/M2 | SYSTOLIC BLOOD PRESSURE: 120 MMHG | HEIGHT: 67 IN | HEART RATE: 83 BPM | OXYGEN SATURATION: 99 %

## 2025-03-04 DIAGNOSIS — Z12.11 ENCOUNTER FOR SCREENING FOR MALIGNANT NEOPLASM OF COLON: ICD-10-CM

## 2025-03-04 DIAGNOSIS — I25.2 OLD MYOCARDIAL INFARCTION: ICD-10-CM

## 2025-03-04 DIAGNOSIS — Z95.5 ATHEROSCLEROTIC HEART DISEASE OF NATIVE CORONARY ARTERY W/OUT ANGINA PECTORIS: ICD-10-CM

## 2025-03-04 DIAGNOSIS — R07.9 CHEST PAIN, UNSPECIFIED: ICD-10-CM

## 2025-03-04 DIAGNOSIS — Z83.719 FAMILY HISTORY OF COLON POLYPS, UNSPECIFIED: ICD-10-CM

## 2025-03-04 DIAGNOSIS — I25.10 ATHEROSCLEROTIC HEART DISEASE OF NATIVE CORONARY ARTERY W/OUT ANGINA PECTORIS: ICD-10-CM

## 2025-03-04 PROCEDURE — 99214 OFFICE O/P EST MOD 30 MIN: CPT

## 2025-03-13 ENCOUNTER — APPOINTMENT (OUTPATIENT)
Dept: DERMATOLOGY | Facility: CLINIC | Age: 50
End: 2025-03-13
Payer: MEDICAID

## 2025-03-13 DIAGNOSIS — D22.9 MELANOCYTIC NEVI, UNSPECIFIED: ICD-10-CM

## 2025-03-13 DIAGNOSIS — Z12.83 ENCOUNTER FOR SCREENING FOR MALIGNANT NEOPLASM OF SKIN: ICD-10-CM

## 2025-03-13 DIAGNOSIS — L81.4 OTHER MELANIN HYPERPIGMENTATION: ICD-10-CM

## 2025-03-13 PROCEDURE — 99213 OFFICE O/P EST LOW 20 MIN: CPT

## 2025-03-21 ENCOUNTER — RX RENEWAL (OUTPATIENT)
Age: 50
End: 2025-03-21

## 2025-03-21 RX ORDER — ASPIRIN 81 MG/1
81 TABLET, COATED ORAL
Qty: 90 | Refills: 3 | Status: ACTIVE | COMMUNITY
Start: 2025-03-21 | End: 1900-01-01

## 2025-04-09 NOTE — COUNSELING
Physical Therapy Visit    Visit Type: Daily Treatment Note  Visit: 6  Referring Provider: Lewis Gabriel MD  Medical Diagnosis (from order): M54.2 - Cervicalgia     SUBJECTIVE                                                                                                               Patient reported she has tightness in the neck today.    Pain / Symptoms  - Pain rating (out of 10): Current: 4       OBJECTIVE                                                                                                                    Observation   Decreased in shoulder protraction with decreased in forward head.  Patient did not need verbal cueing to stand up tall.                        Treatment      - Position: sitting  - Duration: 10 minutes    Reaction: no adverse reaction to treatment      Therapeutic Exercise  Arm bike- 5 min    - Seated Cervical Retraction  - 1 x daily - 7 x weekly - 1 sets - 10 reps - 5 hold  - Supine Shoulder External Rotation with Resistance  - 1 x daily - 7 x weekly - 1 sets - 20 reps  - Supine Cervical Rotation Passive Unloaded  - 1 x daily - 7 x weekly - 1 sets - 2-3 reps - 20 hold  - Supine Cervical Sidebending Stretch  - 1 x daily - 7 x weekly - 1 sets - 2 reps - 20 hold  - Upper Cervical Extension SNAG with Strap  - 1 x daily - 7 x weekly - 1 sets - 10 reps - 5 hold  - Seated Assisted Cervical Rotation with Towel  - 1 x daily - 7 x weekly - 1 sets - 10 reps  - Supine PNF D2 Flexion with Resistance  - 1 x daily - 7 x weekly - 1 sets - 10 reps  - Scapular Retraction with Resistance  - 1 x daily - 7 x weekly - 1 sets - 20 reps  - Low Trap Setting at Wall  - 1 x daily - 7 x weekly - 1 sets - 10 reps  - Standing Thoracic Open Book at Wall  - 1 x daily - 7 x weekly - 1 sets - 10 reps  - Seated Thoracic Self-Mobilization  - 1 x daily - 7 x weekly - 1 sets - 10 reps  - Drawing [place]  - 1 x daily - 7 x weekly - 1 sets - 10 reps    Manual Therapy   Gentle suboccipital release  Cervical side bend  [Potential consequences of obesity discussed] : Potential consequences of obesity discussed [Benefits of weight loss discussed] : Benefits of weight loss discussed stretch   Cervical rotation stretch   Upper trap release bilaterally  Thoracic mobs with wedge  MFR of the left shoulder and scapula- increased left cervical rotaiton    Skilled input: verbal instruction/cues    Writer verbally educated and received verbal consent for hand placement, positioning of patient, and techniques to be performed today from patient for therapist position for techniques and hand placement and palpation for techniques as described above and how they are pertinent to the patient's plan of care.  Home Exercise Program  Access Code: [email or web address]  URL: [email or web address]  Date: 04/03/2025  Prepared by: Yuliet English    Exercises  - Seated Cervical Retraction  - 1 x daily - 7 x weekly - 1 sets - 10 reps - 5 hold  - Supine Shoulder External Rotation with Resistance  - 1 x daily - 7 x weekly - 1 sets - 20 reps  - Supine Cervical Rotation Passive Unloaded  - 1 x daily - 7 x weekly - 1 sets - 2-3 reps - 20 hold  - Supine Cervical Sidebending Stretch  - 1 x daily - 7 x weekly - 1 sets - 2 reps - 20 hold  - Upper Cervical Extension SNAG with Strap  - 1 x daily - 7 x weekly - 1 sets - 10 reps - 5 hold  - Seated Assisted Cervical Rotation with Towel  - 1 x daily - 7 x weekly - 1 sets - 10 reps  - Supine PNF D2 Flexion with Resistance  - 1 x daily - 7 x weekly - 1 sets - 10 reps  - Scapular Retraction with Resistance  - 1 x daily - 7 x weekly - 1 sets - 20 reps  - Low Trap Setting at Wall  - 1 x daily - 7 x weekly - 1 sets - 10 reps  - Standing Thoracic Open Book at Wall  - 1 x daily - 7 x weekly - 1 sets - 10 reps  - Seated Thoracic Self-Mobilization  - 1 x daily - 7 x weekly - 1 sets - 10 reps  - Drawing [place]  - 1 x daily - 7 x weekly - 1 sets - 10 reps      ASSESSMENT                                                                                                            Patient returns for PT follow up for decrease in cervical ROM, decrease in shoulder girdle strength, and poor  [Encouraged to increase physical activity] : Encouraged to increase physical activity posture.  Today has increased tightness and required a heat pack at the end of the session.  Treatment focused on continuing current exercises to address cervical ROM and increase shoulder girdle strength with focus on reducing patients tightness today .  Patient is making good gains as evidenced by tolerating progressive HEP and also increase in cervical flexion and rotation right with minimal to no pain.  Patient tolerated treatment well  Pain/symptoms after session (out of 10): 4  Education:   - Results of above outlined education: Verbalizes understanding and Demonstrates understanding    PLAN                                                                                                                           Suggestions for next session as indicated: Progress per plan of care       Therapy procedure time and total treatment time can be found documented on the Time Entry flowsheet     [Decrease Portions] : decrease portions [None] : None [Good understanding] : Patient has a good understanding of lifestyle changes and steps needed to achieve self management goal [Risk of tobacco use and health benefits of smoking cessation discussed] : Risk of tobacco use and health benefits of smoking cessation discussed [Cessation strategies including cessation program discussed] : Cessation strategies including cessation program discussed [Use of nicotine replacement therapies and other medications discussed] : Use of nicotine replacement therapies and other medications discussed [Encouraged to pick a quit date and identify support needed to quit] : Encouraged to pick a quit date and identify support needed to quit [Willing to Quit Smoking] : Willing to quit smoking [Tobacco Use Cessation Intermediate Greater Than 3 Minutes Up to 10 Minutes] : Tobacco Use Cessation Intermediate Greater Than 3 Minutes Up to 10 Minutes [FreeTextEntry1] : 4

## 2025-04-16 ENCOUNTER — APPOINTMENT (OUTPATIENT)
Dept: GASTROENTEROLOGY | Facility: AMBULATORY MEDICAL SERVICES | Age: 50
End: 2025-04-16
Payer: MEDICAID

## 2025-04-16 PROCEDURE — 45378 DIAGNOSTIC COLONOSCOPY: CPT | Mod: 33

## 2025-05-06 ENCOUNTER — NON-APPOINTMENT (OUTPATIENT)
Age: 50
End: 2025-05-06

## 2025-05-14 ENCOUNTER — APPOINTMENT (OUTPATIENT)
Dept: GASTROENTEROLOGY | Facility: AMBULATORY MEDICAL SERVICES | Age: 50
End: 2025-05-14
Payer: COMMERCIAL

## 2025-05-14 ENCOUNTER — APPOINTMENT (OUTPATIENT)
Dept: INTERNAL MEDICINE | Facility: CLINIC | Age: 50
End: 2025-05-14

## 2025-05-14 PROCEDURE — 43239 EGD BIOPSY SINGLE/MULTIPLE: CPT

## 2025-06-12 ENCOUNTER — APPOINTMENT (OUTPATIENT)
Dept: DERMATOLOGY | Facility: CLINIC | Age: 50
End: 2025-06-12
Payer: COMMERCIAL

## 2025-06-12 PROCEDURE — 99213 OFFICE O/P EST LOW 20 MIN: CPT

## 2025-06-26 ENCOUNTER — APPOINTMENT (OUTPATIENT)
Dept: INTERNAL MEDICINE | Facility: CLINIC | Age: 50
End: 2025-06-26
Payer: COMMERCIAL

## 2025-06-26 VITALS
BODY MASS INDEX: 28.51 KG/M2 | RESPIRATION RATE: 16 BRPM | HEART RATE: 100 BPM | WEIGHT: 182 LBS | DIASTOLIC BLOOD PRESSURE: 68 MMHG | OXYGEN SATURATION: 97 % | SYSTOLIC BLOOD PRESSURE: 118 MMHG

## 2025-06-26 VITALS
HEART RATE: 100 BPM | SYSTOLIC BLOOD PRESSURE: 118 MMHG | OXYGEN SATURATION: 97 % | DIASTOLIC BLOOD PRESSURE: 68 MMHG | RESPIRATION RATE: 16 BRPM | WEIGHT: 182 LBS | HEIGHT: 67 IN | BODY MASS INDEX: 28.56 KG/M2

## 2025-06-26 VITALS — HEART RATE: 80 BPM

## 2025-06-26 PROCEDURE — 99214 OFFICE O/P EST MOD 30 MIN: CPT

## 2025-06-26 PROCEDURE — G2211 COMPLEX E/M VISIT ADD ON: CPT

## 2025-09-10 ENCOUNTER — APPOINTMENT (OUTPATIENT)
Dept: DERMATOLOGY | Facility: CLINIC | Age: 50
End: 2025-09-10
Payer: COMMERCIAL

## 2025-09-10 DIAGNOSIS — Z12.83 ENCOUNTER FOR SCREENING FOR MALIGNANT NEOPLASM OF SKIN: ICD-10-CM

## 2025-09-10 DIAGNOSIS — D22.9 MELANOCYTIC NEVI, UNSPECIFIED: ICD-10-CM

## 2025-09-10 DIAGNOSIS — L81.4 OTHER MELANIN HYPERPIGMENTATION: ICD-10-CM

## 2025-09-10 DIAGNOSIS — L82.0 INFLAMED SEBORRHEIC KERATOSIS: ICD-10-CM

## 2025-09-10 PROCEDURE — 99213 OFFICE O/P EST LOW 20 MIN: CPT | Mod: 25

## 2025-09-10 PROCEDURE — 17110 DESTRUCTION B9 LES UP TO 14: CPT
